# Patient Record
Sex: FEMALE | Race: WHITE | NOT HISPANIC OR LATINO
[De-identification: names, ages, dates, MRNs, and addresses within clinical notes are randomized per-mention and may not be internally consistent; named-entity substitution may affect disease eponyms.]

---

## 2021-05-10 ENCOUNTER — NON-APPOINTMENT (OUTPATIENT)
Age: 81
End: 2021-05-10

## 2021-05-10 ENCOUNTER — APPOINTMENT (OUTPATIENT)
Dept: INTERNAL MEDICINE | Facility: CLINIC | Age: 81
End: 2021-05-10
Payer: MEDICARE

## 2021-05-10 VITALS
DIASTOLIC BLOOD PRESSURE: 74 MMHG | SYSTOLIC BLOOD PRESSURE: 136 MMHG | OXYGEN SATURATION: 98 % | BODY MASS INDEX: 30.82 KG/M2 | TEMPERATURE: 97.3 F | HEIGHT: 65 IN | HEART RATE: 115 BPM | RESPIRATION RATE: 14 BRPM | WEIGHT: 185 LBS

## 2021-05-10 DIAGNOSIS — Z80.42 FAMILY HISTORY OF MALIGNANT NEOPLASM OF PROSTATE: ICD-10-CM

## 2021-05-10 DIAGNOSIS — E53.8 DEFICIENCY OF OTHER SPECIFIED B GROUP VITAMINS: ICD-10-CM

## 2021-05-10 DIAGNOSIS — Z82.0 FAMILY HISTORY OF EPILEPSY AND OTHER DISEASES OF THE NERVOUS SYSTEM: ICD-10-CM

## 2021-05-10 DIAGNOSIS — Z82.49 FAMILY HISTORY OF ISCHEMIC HEART DISEASE AND OTHER DISEASES OF THE CIRCULATORY SYSTEM: ICD-10-CM

## 2021-05-10 PROBLEM — Z00.00 ENCOUNTER FOR PREVENTIVE HEALTH EXAMINATION: Status: ACTIVE | Noted: 2021-05-10

## 2021-05-10 PROCEDURE — 99204 OFFICE O/P NEW MOD 45 MIN: CPT

## 2021-05-10 RX ORDER — OMEGA-3/DHA/EPA/FISH OIL 1200 MG
1200 CAPSULE ORAL
Refills: 0 | Status: ACTIVE | COMMUNITY

## 2021-05-10 RX ORDER — ASPIRIN ENTERIC COATED TABLETS 81 MG 81 MG/1
81 TABLET, DELAYED RELEASE ORAL
Qty: 90 | Refills: 0 | Status: ACTIVE | COMMUNITY

## 2021-05-10 NOTE — HEALTH RISK ASSESSMENT
[Good] : ~his/her~  mood as  good [No] : In the past 12 months have you used drugs other than those required for medical reasons? No [No falls in past year] : Patient reported no falls in the past year [0] : 2) Feeling down, depressed, or hopeless: Not at all (0) [] : No [[id number]] : 0

## 2021-05-10 NOTE — END OF VISIT
[] : A student assisted with documenting this visit. I have reviewed and verified all information documented by the student, and made modifications to such information, when appropriate. [FreeTextEntry3] : "I, Xander Sears, personally scribed the services dictated to me by Dr. Jesus Alberto Reyes MD in this documentation on 05/10/2021 "\par \par "I Dr. Jesus Alberto Reyes MD, personally performed the services described in this documentation on 05/10/2021 for the patient as scribed by Xander Sears in my presence. I have reviewed and verified that all the information is accurate and true."

## 2021-05-10 NOTE — PLAN
[FreeTextEntry1] : referred to oncology\par Further instructions pending lab results \par Continue medications\par

## 2021-05-10 NOTE — HISTORY OF PRESENT ILLNESS
[Family Member] : family member [FreeTextEntry1] : new pt [de-identified] : TAVARES DIAZ is a 80 year old F who presents today to establish care. Pt complains of runny nose. Has history of lymphoma, diabetes, and hyperchloremia.

## 2021-05-10 NOTE — PHYSICAL EXAM
[No Acute Distress] : no acute distress [Well Nourished] : well nourished [Well Developed] : well developed [Well-Appearing] : well-appearing [Normal Voice/Communication] : normal voice/communication [Normal Sclera/Conjunctiva] : normal sclera/conjunctiva [PERRL] : pupils equal round and reactive to light [EOMI] : extraocular movements intact [Normal Outer Ear/Nose] : the outer ears and nose were normal in appearance [No JVD] : no jugular venous distention [No Lymphadenopathy] : no lymphadenopathy [Supple] : supple [Thyroid Normal, No Nodules] : the thyroid was normal and there were no nodules present [No Respiratory Distress] : no respiratory distress  [No Accessory Muscle Use] : no accessory muscle use [Clear to Auscultation] : lungs were clear to auscultation bilaterally [Normal Rate] : normal rate  [Regular Rhythm] : with a regular rhythm [Normal S1, S2] : normal S1 and S2 [No Carotid Bruits] : no carotid bruits [No Murmur] : no murmur heard [No Abdominal Bruit] : a ~M bruit was not heard ~T in the abdomen [No Varicosities] : no varicosities [Pedal Pulses Present] : the pedal pulses are present [No Edema] : there was no peripheral edema [No Palpable Aorta] : no palpable aorta [No Extremity Clubbing/Cyanosis] : no extremity clubbing/cyanosis [Soft] : abdomen soft [Non Tender] : non-tender [Non-distended] : non-distended [No Masses] : no abdominal mass palpated [No HSM] : no HSM [Normal Bowel Sounds] : normal bowel sounds [Normal Supraclavicular Nodes] : no supraclavicular lymphadenopathy [Normal Posterior Cervical Nodes] : no posterior cervical lymphadenopathy [Normal Anterior Cervical Nodes] : no anterior cervical lymphadenopathy [No CVA Tenderness] : no CVA  tenderness [No Spinal Tenderness] : no spinal tenderness [No Joint Swelling] : no joint swelling [Grossly Normal Strength/Tone] : grossly normal strength/tone [No Rash] : no rash [Coordination Grossly Intact] : coordination grossly intact [No Focal Deficits] : no focal deficits [Normal Gait] : normal gait [Deep Tendon Reflexes (DTR)] : deep tendon reflexes were 2+ and symmetric [Speech Grossly Normal] : speech grossly normal [Memory Grossly Normal] : memory grossly normal [Normal Affect] : the affect was normal [Alert and Oriented x3] : oriented to person, place, and time [Normal Mood] : the mood was normal [Normal Insight/Judgement] : insight and judgment were intact

## 2021-05-11 ENCOUNTER — TRANSCRIPTION ENCOUNTER (OUTPATIENT)
Age: 81
End: 2021-05-11

## 2021-05-11 LAB
25(OH)D3 SERPL-MCNC: 42.8 NG/ML
ALBUMIN SERPL ELPH-MCNC: 4.5 G/DL
ALP BLD-CCNC: 108 U/L
ALT SERPL-CCNC: 13 U/L
ANION GAP SERPL CALC-SCNC: 12 MMOL/L
AST SERPL-CCNC: 17 U/L
BASOPHILS # BLD AUTO: 0.03 K/UL
BASOPHILS NFR BLD AUTO: 0.4 %
BILIRUB SERPL-MCNC: 0.4 MG/DL
BUN SERPL-MCNC: 18 MG/DL
CALCIUM SERPL-MCNC: 9.9 MG/DL
CHLORIDE SERPL-SCNC: 105 MMOL/L
CHOLEST SERPL-MCNC: 156 MG/DL
CK SERPL-CCNC: 54 U/L
CO2 SERPL-SCNC: 27 MMOL/L
CREAT SERPL-MCNC: 0.87 MG/DL
EOSINOPHIL # BLD AUTO: 0.11 K/UL
EOSINOPHIL NFR BLD AUTO: 1.4 %
ESTIMATED AVERAGE GLUCOSE: 143 MG/DL
GLUCOSE SERPL-MCNC: 134 MG/DL
HBA1C MFR BLD HPLC: 6.6 %
HCT VFR BLD CALC: 45.1 %
HDLC SERPL-MCNC: 55 MG/DL
HGB BLD-MCNC: 14.2 G/DL
IMM GRANULOCYTES NFR BLD AUTO: 0.1 %
LDLC SERPL CALC-MCNC: 70 MG/DL
LYMPHOCYTES # BLD AUTO: 2.76 K/UL
LYMPHOCYTES NFR BLD AUTO: 34.2 %
MAN DIFF?: NORMAL
MCHC RBC-ENTMCNC: 28.9 PG
MCHC RBC-ENTMCNC: 31.5 GM/DL
MCV RBC AUTO: 91.9 FL
MONOCYTES # BLD AUTO: 0.53 K/UL
MONOCYTES NFR BLD AUTO: 6.6 %
NEUTROPHILS # BLD AUTO: 4.64 K/UL
NEUTROPHILS NFR BLD AUTO: 57.3 %
NONHDLC SERPL-MCNC: 101 MG/DL
PLATELET # BLD AUTO: 253 K/UL
POTASSIUM SERPL-SCNC: 5.1 MMOL/L
PROT SERPL-MCNC: 7.1 G/DL
RBC # BLD: 4.91 M/UL
RBC # FLD: 13.2 %
SODIUM SERPL-SCNC: 143 MMOL/L
TRIGL SERPL-MCNC: 155 MG/DL
TSH SERPL-ACNC: 1.67 UIU/ML
WBC # FLD AUTO: 8.08 K/UL

## 2021-07-08 ENCOUNTER — RESULT REVIEW (OUTPATIENT)
Age: 81
End: 2021-07-08

## 2021-07-08 ENCOUNTER — OUTPATIENT (OUTPATIENT)
Dept: OUTPATIENT SERVICES | Facility: HOSPITAL | Age: 81
LOS: 1 days | Discharge: HOME | End: 2021-07-08

## 2021-07-08 ENCOUNTER — APPOINTMENT (OUTPATIENT)
Dept: HEMATOLOGY ONCOLOGY | Facility: CLINIC | Age: 81
End: 2021-07-08
Payer: MEDICARE

## 2021-07-08 VITALS
TEMPERATURE: 97.9 F | HEART RATE: 63 BPM | SYSTOLIC BLOOD PRESSURE: 160 MMHG | WEIGHT: 186 LBS | BODY MASS INDEX: 30.95 KG/M2 | DIASTOLIC BLOOD PRESSURE: 74 MMHG

## 2021-07-08 DIAGNOSIS — N32.89 OTHER SPECIFIED DISORDERS OF BLADDER: ICD-10-CM

## 2021-07-08 DIAGNOSIS — C18.9 MALIGNANT NEOPLASM OF COLON, UNSPECIFIED: ICD-10-CM

## 2021-07-08 DIAGNOSIS — R91.1 SOLITARY PULMONARY NODULE: ICD-10-CM

## 2021-07-08 PROCEDURE — 99205 OFFICE O/P NEW HI 60 MIN: CPT

## 2021-07-08 NOTE — RESULTS/DATA
[FreeTextEntry1] : 9/3/20 Cystoscopy: Introitus healthy without lesions. Bladder showed minimal trabeculation and three large mouth diverticula at tight dome. No erythema, calculi or masses. \par \par 8/13/20 PET: large partial response to therapy in the multifocal skeletal disease with resolution of nearly all of the areas. There is persistent abnormal activity in the anterior right 3rd rib. Complete response to therapy involving cardiophrenic adenopathy. Bilateral nonspecific thyroid activity, moderately decreased. A left lower lobe pulmonary nodule demonstrated on the July diagnostic CT is not demonstrated today and likely represented small round basilar atelectasis. \par \par 7/21/20 CT C/A/P: new 7mm left lower lobe pulmonary nodule, possible metastasis. Additional subcentimeter pulmonary nodules are stable. Interval resolution of a pleural-based lesion adjacent to the left 3rd rib. Significant decrease in size of an anterior pericardial lymph node, however there has been increase in size of a lymph node adjacent to the supradiaphragmatic IVC. Stable hepatic cysts and subcentimeter perihepatic nodules. No new liver mass. Development of asymmetric urinary bladder wall thickening and multiple bladder wall nodules. \par \par 6/11/20 CT spine: Interval decrease in size of soft tissue nodule in the left paraspinal region at T12 level. It measures about 1cm in diameter. No new paraspinal lesions are noted. There is partial visualization of hepatic foci of low-attenuation, correlation with abd CT is recommended. \par \par 3/3/20 MRI: well-circumscribed enhancing left paraspinal soft tissue nodule is noted at T12 level that is slightly increased in size when compared to prior study. There is corresponding increased perfusion. It measures 22 x 7mm in diameter. this may represent a metastatic deposit. Previously noted left posterior 3rd rib soft tissue abnormality is smaller in size when compared to prior study. Few mm focus of abnormal signal involving the T7 vertebral body is not significantly changed. 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 Flow cytometry showed CD20, CD10, CD23, CD21, BCL2, CD19 and CD45 positive. Ki67- less than 3%.\par \par 8/6/19 PET: Findings suspicious for residual metabolically active metastasis involving the left-sided pleural-based soft tissue lesion, cardiophrenic angle lymph node, retroperitoneal lymph nodes and osseous lesions. There is mild heterogeneous abnormal FDG uptake involving the liver without definite CT correlates. This is favored to represent an artifactual etiology, although underlying low-grade metabolically active metastasis cannot be excluded. There are bilateral FDG avid thyroid gland nodules. The differential includes primary thyroid metabolically active malignancy versus benign adenomas. A tissue  biopsy is recommended. \par \par 8/6/19 MRI: nodule posterior medial left lung centered at T2 and based on the pleura also affecting the left posterior medial rib. This is most likely neoplastic. Area of enhancement in the posterior body and pedicle on the right at T7 possibly represents a metastatic focus. Heterogeneous enhancement within the nasal pharynx is uncertain cause. \par \par 7/5/19 CT: 1.9cm enhancing pleural plaque along the left paraspinal region at the T3 level which is new from the prior CT suspicious for a metastatic lesion. \par post surgical changes from right hemicolectomy with no evidence of tumor recurrence in the abdomen or pelvis. \par \par 6/14/18 colonoscopy completed, repeat in 3 years\par \par 7/5/17 path: liver excisional biopsy: fibrous nodule measuring 0.6cm negative for carcinoma. \par colon ascending right hemicolectomy: invasive adenocarcinoma, moderately differentiated, invading subserosa; surgical margins negative for carcinoma\par synoptic report - colon rectum: right hemicolectomy, specimen size 30cm, tumor site: right ascending, 2.8cm; adenocarcinoma low-grade (well differentiated and moderately differentiated). Microscopic tumor extension: tumor invades through the muscularis propria into the subserosal adipose tissue or the nonperitonealized pericolic or perirectal soft tissues but does not extend to the serosal surface. proximal margins uninvolved by invasive carcinoma; circumferential or mesenteric margin uninvolved by invasive carcinoma. Lynph-vascular invasion; not identified; perineural invasion: not identified. Immunohistochemistry studies for mismatch repair proteins: [id number]. LN examined: 16, LN involved: 0 \par \par 6/15/17 Pathology: ileocecal valve polyp biopsy: colonic mucosa with focal early adenomatous changes; ascending colon polyp biopsy: tubular adenoma; hepatic flexure mass: invasive moderately differentiated adenocarcinoma. immunohistochemical studies: tumor cells retain nuclear stains for MLH1, MSH2, MMSH6 and PMS2. \par \par 6/9/17 CT C/A/P: a mass at the level of the hepatic flexure without evidence of regional cali metastases. Multiple small bipolar hepatic cysts, largest measures 2.14cm. No evidence of distant metastatic disease. \par \par 6/6/17 Colonoscopy: previously identified lipomas and a polypoid, ulcerated, non-obstruction mass at the hepatic flexure 4cm in length, tattooed and biopsy consistent with moderately differentiated adenocarcinoma. The previously noted hepatic flexure lipoma located just proximal to the ulderated mass. Few 5mm polyps removed from the ascending colon and ICV consistent with TA and colonic mucosa with focal early adenomatous changes, respectively. CEA non elevated.\par \par 1/30/14 Colonoscopy: cecal lipoma and hepatic flexure lipoma

## 2021-07-08 NOTE — HISTORY OF PRESENT ILLNESS
[de-identified] : The patient was diagnosed with colon cancer in June 2017 at the age of 76. On 1/30/14, she had a routine colonoscopy showing a cecal lipoma and hepatic flexure lipoma. Follow up colonoscopy on 6/6/17 showed previously identified lipomas and a polypoid, ulcerated, non-obstructing mass at the hepatic flexure 4cm in length consistent with moderately differentiated adenocarcinoma.   CEA was not elevated. CT 6/9/17 showed a mass at the level of the hepatic flexure without evidence of regional cali metastases. and multiple bipolar hepatic cysts, largest measuring 2 cm. She underwent a right hemicolectomy and liver excisional biopsy.  Pathology was c/w invasive adenocarcinoma, moderately differentiated, invading subserosa; surgical margins negative for carcinoma.  Liver biopsy was c/w a fibrous nodule that was negative for carcinoma. \par  [id number]. LN examined: 16, LN involved: 0.  Tumor cells retained nuclear stains for MLH1, MSH2, MMSH6 and PMS2. \par \par Per record dated August 13, 2020, she has been on surveillance.\par 7/5/2019 CT: Enhancing pleural plaque along the left paraspinal region at T3, suspicious for metastatic lesion\par 7/5/2019 PET: Hypermetabolic pleural lesion, cardiophrenic and retroperitoneal lymphadenopathy, osseous lesions\par 8/14/2019 biopsy lung lesion: Marginal zone B-cell lymphoma.  Followed by lymphoma service in [place] and on no active treatment.\par 6/11/2020 CT: Interval decrease in size of soft tissue nodule in the left paraspinal region at T12.  It measures about 1 cm.  No new paraspinal lesions.\par 7/21/2020 CT: New 7 mm left lower lobe nodule.  Additional subcentimeter pulmonary nodules stable.  Interval resolution of pleural-based lesion adjacent to left third rib.  Increase in size of lymph node adjacent to subdiaphragmatic IVC.  Stable hepatic cysts and subcentimeter perihepatic nodules.  No liver mass.  Asymmetric urinary bladder wall thickening and multiple bladder wall nodules.\par 8/13/2020 PET: Large partial response to therapy in the multifocal skeletal disease with resolution of nearly all of the areas.  Persistent abnormal activity in the anterior third rib.  Complete response to therapy involving cardiophrenic adenopathy.  Bilateral nonspecific thyroid activity.  Left lower lobe pulmonary nodule no longer demonstrated. [de-identified] : SH:  PT moved here from [place] recently;  Pt is a .  Lives with daughter here and has a son in [place].

## 2021-07-09 DIAGNOSIS — R91.1 SOLITARY PULMONARY NODULE: ICD-10-CM

## 2021-07-09 DIAGNOSIS — C85.90 NON-HODGKIN LYMPHOMA, UNSPECIFIED, UNSPECIFIED SITE: ICD-10-CM

## 2021-07-09 DIAGNOSIS — N32.89 OTHER SPECIFIED DISORDERS OF BLADDER: ICD-10-CM

## 2021-07-19 ENCOUNTER — RESULT REVIEW (OUTPATIENT)
Age: 81
End: 2021-07-19

## 2021-07-19 ENCOUNTER — OUTPATIENT (OUTPATIENT)
Dept: OUTPATIENT SERVICES | Facility: HOSPITAL | Age: 81
LOS: 1 days | End: 2021-07-19
Payer: MEDICARE

## 2021-07-19 ENCOUNTER — APPOINTMENT (OUTPATIENT)
Dept: MAMMOGRAPHY | Facility: CLINIC | Age: 81
End: 2021-07-19
Payer: MEDICARE

## 2021-07-19 DIAGNOSIS — C18.9 MALIGNANT NEOPLASM OF COLON, UNSPECIFIED: ICD-10-CM

## 2021-07-19 PROCEDURE — 77063 BREAST TOMOSYNTHESIS BI: CPT | Mod: 26

## 2021-07-19 PROCEDURE — 77067 SCR MAMMO BI INCL CAD: CPT | Mod: 26

## 2021-07-19 PROCEDURE — 77063 BREAST TOMOSYNTHESIS BI: CPT

## 2021-07-19 PROCEDURE — 77067 SCR MAMMO BI INCL CAD: CPT

## 2021-07-20 ENCOUNTER — APPOINTMENT (OUTPATIENT)
Dept: INTERNAL MEDICINE | Facility: CLINIC | Age: 81
End: 2021-07-20
Payer: MEDICARE

## 2021-07-20 ENCOUNTER — NON-APPOINTMENT (OUTPATIENT)
Age: 81
End: 2021-07-20

## 2021-07-20 VITALS
HEART RATE: 70 BPM | RESPIRATION RATE: 15 BRPM | DIASTOLIC BLOOD PRESSURE: 78 MMHG | SYSTOLIC BLOOD PRESSURE: 142 MMHG | HEIGHT: 65 IN | BODY MASS INDEX: 30.82 KG/M2 | WEIGHT: 185 LBS | OXYGEN SATURATION: 98 % | TEMPERATURE: 97.2 F

## 2021-07-20 PROCEDURE — 99214 OFFICE O/P EST MOD 30 MIN: CPT | Mod: 25

## 2021-07-20 PROCEDURE — 81003 URINALYSIS AUTO W/O SCOPE: CPT | Mod: QW

## 2021-07-20 PROCEDURE — 99204 OFFICE O/P NEW MOD 45 MIN: CPT | Mod: 25

## 2021-07-21 LAB
BILIRUB UR QL STRIP: NORMAL
GLUCOSE UR-MCNC: NORMAL
HCG UR QL: 0.2 EU/DL
HGB UR QL STRIP.AUTO: NORMAL
KETONES UR-MCNC: NORMAL
LEUKOCYTE ESTERASE UR QL STRIP: NORMAL
NITRITE UR QL STRIP: NORMAL
PH UR STRIP: 5
PROT UR STRIP-MCNC: NORMAL
SP GR UR STRIP: 1.03

## 2021-07-21 RX ORDER — BLOOD SUGAR DIAGNOSTIC
STRIP MISCELLANEOUS
Qty: 100 | Refills: 0 | Status: ACTIVE | COMMUNITY
Start: 2020-10-05

## 2021-07-21 NOTE — HISTORY OF PRESENT ILLNESS
[FreeTextEntry8] : Ms. 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 is over all feeling well.\par No trouble sleeping at night.\par \par

## 2021-07-21 NOTE — REVIEW OF SYSTEMS
[Frequency] : frequency [Negative] : Gastrointestinal [Incontinence] : no incontinence [Hematuria] : no hematuria

## 2021-07-21 NOTE — HEALTH RISK ASSESSMENT
[No falls in past year] : Patient reported no falls in the past year [0] : 2) Feeling down, depressed, or hopeless: Not at all (0) [[id number]] : 0

## 2021-07-21 NOTE — ASSESSMENT
[FreeTextEntry1] : Ms. 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

## 2021-07-24 ENCOUNTER — APPOINTMENT (OUTPATIENT)
Dept: NUCLEAR MEDICINE | Facility: CLINIC | Age: 81
End: 2021-07-24

## 2021-07-27 LAB — BACTERIA UR CULT: ABNORMAL

## 2021-07-31 ENCOUNTER — APPOINTMENT (OUTPATIENT)
Dept: NUCLEAR MEDICINE | Facility: CLINIC | Age: 81
End: 2021-07-31
Payer: MEDICARE

## 2021-07-31 ENCOUNTER — OUTPATIENT (OUTPATIENT)
Dept: OUTPATIENT SERVICES | Facility: HOSPITAL | Age: 81
LOS: 1 days | End: 2021-07-31
Payer: MEDICARE

## 2021-07-31 DIAGNOSIS — C18.9 MALIGNANT NEOPLASM OF COLON, UNSPECIFIED: ICD-10-CM

## 2021-07-31 PROCEDURE — 78815 PET IMAGE W/CT SKULL-THIGH: CPT | Mod: 26,PI,MH

## 2021-07-31 PROCEDURE — 78815 PET IMAGE W/CT SKULL-THIGH: CPT

## 2021-07-31 PROCEDURE — A9552: CPT

## 2021-08-05 ENCOUNTER — APPOINTMENT (OUTPATIENT)
Dept: OBGYN | Facility: CLINIC | Age: 81
End: 2021-08-05
Payer: MEDICARE

## 2021-08-05 VITALS
SYSTOLIC BLOOD PRESSURE: 130 MMHG | HEIGHT: 65 IN | DIASTOLIC BLOOD PRESSURE: 72 MMHG | WEIGHT: 183.4 LBS | BODY MASS INDEX: 30.56 KG/M2

## 2021-08-05 PROCEDURE — G0101: CPT

## 2021-08-05 NOTE — HISTORY OF PRESENT ILLNESS
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 She had a mammo a couple of months ago has dense breast we will give her a sono rx. Normal hx for paps. No  bleeding. Hxcolon cancer 2017, lymphoma dx in 2019, mammo july 2021 [Previously active] : previously active

## 2021-08-05 NOTE — PHYSICAL EXAM

## 2021-08-06 ENCOUNTER — APPOINTMENT (OUTPATIENT)
Dept: GASTROENTEROLOGY | Facility: CLINIC | Age: 81
End: 2021-08-06
Payer: MEDICARE

## 2021-08-06 VITALS
HEIGHT: 65 IN | WEIGHT: 183 LBS | DIASTOLIC BLOOD PRESSURE: 69 MMHG | HEART RATE: 69 BPM | SYSTOLIC BLOOD PRESSURE: 129 MMHG | BODY MASS INDEX: 30.49 KG/M2

## 2021-08-06 PROCEDURE — 99203 OFFICE O/P NEW LOW 30 MIN: CPT

## 2021-08-06 NOTE — ASSESSMENT
[FreeTextEntry1] : 82 yo female with personal history of colon cancer. Will arrange for colonoscopy with Miralax clean out.

## 2021-08-06 NOTE — HISTORY OF PRESENT ILLNESS
[de-identified] : Ms. TAVARES DIAZ is a 81 year old female with history of colon cancer status post right hemicolectomy. Patient was also diagnosed within the lymphoma which has been followed. Patient has no complaints of abdominal pain, rectal bleeding, heartburn. Prior records were reviewed. Patient would like to schedule surveillance colonoscopy.\par

## 2021-08-09 ENCOUNTER — NON-APPOINTMENT (OUTPATIENT)
Age: 81
End: 2021-08-09

## 2021-08-12 LAB — BACTERIA UR CULT: NORMAL

## 2021-08-13 ENCOUNTER — APPOINTMENT (OUTPATIENT)
Dept: HEMATOLOGY ONCOLOGY | Facility: CLINIC | Age: 81
End: 2021-08-13
Payer: MEDICARE

## 2021-08-13 ENCOUNTER — OUTPATIENT (OUTPATIENT)
Dept: OUTPATIENT SERVICES | Facility: HOSPITAL | Age: 81
LOS: 1 days | Discharge: HOME | End: 2021-08-13

## 2021-08-13 VITALS
HEART RATE: 71 BPM | SYSTOLIC BLOOD PRESSURE: 120 MMHG | DIASTOLIC BLOOD PRESSURE: 72 MMHG | TEMPERATURE: 97.3 F | WEIGHT: 183 LBS | BODY MASS INDEX: 30.45 KG/M2

## 2021-08-13 PROCEDURE — 99215 OFFICE O/P EST HI 40 MIN: CPT

## 2021-08-13 NOTE — REASON FOR VISIT
[Initial Consultation] : an initial consultation for [FreeTextEntry2] : marginal zone lymphoma diagnosed in 2019

## 2021-08-13 NOTE — RESULTS/DATA
[FreeTextEntry1] : reviewed report and images form recent PET and  images from PET 2020 and 2019 in  [place]

## 2021-08-13 NOTE — PHYSICAL EXAM
[Fully active, able to carry on all pre-disease performance without restriction] : Status 0 - Fully active, able to carry on all pre-disease performance without restriction [Normal] : affect appropriate [de-identified] : looks well  [de-identified] : cannot  palpate thyroid nodules

## 2021-08-13 NOTE — ASSESSMENT
[FreeTextEntry1] : 82 y/o female with hx of stage II colon cancer s/p R hemicolectomy 2017- DIALLO  Surveillance colonoscopy scheduled.\par \par Hx of MALT lymphoma- paraspinal/ pleural based and ? avid  bone lesions on PET - documented since at least 2019 - but per recent report- worse .\par Will review images with radiology- bone lesions presumed to be lymphoma- but never proven by biopsy ( possible but still unusual presentation of MALT). Might need biopsy of one of avid bone areas.\par Avid thyroid nodules- will refer to endocrinology. Might  need FNA / biopsy to r/o primary thyroid  malignancy.\par \par Discussed with patient  and her daughter. Will call patient if bone biopsy necessary-  otherwise routine follow up in 3 months.

## 2021-08-13 NOTE — HISTORY OF PRESENT ILLNESS
[de-identified] : 81 F seen previously at [facility], now moved to .\par \par Has hx of colon cancer 2017- 1/15/17 right hemicolectomy pT3, N0  ( 0/16)  mod diff adeno of sigmoid flexure MSI intact \par Follow up scans for surveillance after colon cancer in July 2019 showed pleural plaque left paraspinal at low thoracic  level,  avid on PET. [facility] pleural biopsy  8/14/19  : extranodal marginal zone lymphoma  ( MALT)  ( CD 20, CD 10, CD 23, CD19, CD 45 positive ki67 < 3 %  . No systemic tx  was necessary- active surveillance \par Follow up PET done here recently. \par \par Feels well . Active, no systemic sx, no pain, no bone pain. Colonoscopy scheduled for Sept ( colon cancer follow up) \par Last scans :

## 2021-08-16 DIAGNOSIS — C18.9 MALIGNANT NEOPLASM OF COLON, UNSPECIFIED: ICD-10-CM

## 2021-08-17 ENCOUNTER — RESULT REVIEW (OUTPATIENT)
Age: 81
End: 2021-08-17

## 2021-08-19 ENCOUNTER — APPOINTMENT (OUTPATIENT)
Dept: HEMATOLOGY ONCOLOGY | Facility: CLINIC | Age: 81
End: 2021-08-19

## 2021-08-19 LAB — SURGICAL PATHOLOGY STUDY: SIGNIFICANT CHANGE UP

## 2021-08-25 DIAGNOSIS — E04.1 NONTOXIC SINGLE THYROID NODULE: ICD-10-CM

## 2021-08-25 DIAGNOSIS — C88.4 EXTRANODAL MARGINAL ZONE B-CELL LYMPHOMA OF MUCOSA-ASSOCIATED LYMPHOID TISSUE [MALT-LYMPHOMA]: ICD-10-CM

## 2021-09-02 ENCOUNTER — APPOINTMENT (OUTPATIENT)
Dept: ENDOCRINOLOGY | Facility: CLINIC | Age: 81
End: 2021-09-02
Payer: MEDICARE

## 2021-09-02 VITALS
BODY MASS INDEX: 30.32 KG/M2 | WEIGHT: 182 LBS | DIASTOLIC BLOOD PRESSURE: 70 MMHG | HEART RATE: 75 BPM | OXYGEN SATURATION: 98 % | HEIGHT: 65 IN | SYSTOLIC BLOOD PRESSURE: 120 MMHG | TEMPERATURE: 97.6 F | RESPIRATION RATE: 16 BRPM

## 2021-09-02 LAB — HBA1C MFR BLD HPLC: 6.6

## 2021-09-02 PROCEDURE — 99205 OFFICE O/P NEW HI 60 MIN: CPT | Mod: 25

## 2021-09-02 PROCEDURE — 99215 OFFICE O/P EST HI 40 MIN: CPT | Mod: 25

## 2021-09-02 PROCEDURE — 83036 HEMOGLOBIN GLYCOSYLATED A1C: CPT | Mod: QW

## 2021-09-02 RX ORDER — SULFAMETHOXAZOLE AND TRIMETHOPRIM 800; 160 MG/1; MG/1
800-160 TABLET ORAL TWICE DAILY
Qty: 14 | Refills: 0 | Status: COMPLETED | COMMUNITY
Start: 2021-07-20 | End: 2021-09-02

## 2021-09-02 RX ORDER — LEVOFLOXACIN 500 MG/1
500 TABLET, FILM COATED ORAL DAILY
Qty: 5 | Refills: 0 | Status: COMPLETED | COMMUNITY
Start: 2021-07-27 | End: 2021-09-02

## 2021-09-02 RX ORDER — LEVOFLOXACIN 500 MG/1
500 TABLET, FILM COATED ORAL DAILY
Qty: 7 | Refills: 0 | Status: COMPLETED | COMMUNITY
Start: 2021-08-09 | End: 2021-09-02

## 2021-09-02 NOTE — REASON FOR VISIT
[Initial Evaluation] : an initial evaluation [Thyroid nodule/ MNG] : thyroid nodule/ MNG [DM Type 2] : DM Type 2 [Family Member] : family member

## 2021-09-02 NOTE — HISTORY OF PRESENT ILLNESS
[FreeTextEntry1] : Ms. Luna is presenting to establish care for her hypothyroidism, diabetes and thyroid nodule. 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 She is referred by Oncologist Dr. Franz to rule out thyroid cancer as recent PET scan showed 2 nodules. \par \par #Hypothyroidism \par Diagnosed at least 10 years ago. \par TSH 1.67 May 2021\par Levothyroxine dose 75mcg has been stable. \par Daughter has PTC, thyroidectomy. \par \par #Thyroid Nodule \par FDG avid low-attenuation left lower thyroid lobe substernal focus, 1.4 cm (image 74), SUV 10.2; previous SUV 5.6. Smaller focus in the posterior right upper thyroid lobe, SUV 8.0 (image 71); previous SUV 3.6. Physiologic FDG activity in visualized portions of the brain, major salivary glands, and neck muscles.\par Never had a thyroid sonogram before or FNA. \par \par #Diabetes type 2\par DM for over 10 years. \par Reports no microvascular complications, no history of retinopathy, nephropathy or neuropathy. Reports no history of cardiovascular risk factors, no history of CAD, CHF or CVA in the past. \par \par Current DM meds: metformin 500mg-glipizide 2.5mg 1-2x. Takes it once daily but if AM sugar is >140 then she takes the second tablet. Januvia 100mg \par Prior DM meds: \par Other pertinent meds: \par \par POCT A1c%: 6.6% \par POCT B\par \par FSG: Freestyle lite \par Pre-Breakfast: 109-185\par Hypoglycemic episodes: none \par 7 day avg 147\par 14 day avg 140\par 30 day avg 140\par \par Diet: \par Breakfast: Grits with cheese and torres \par Lunch: Grits with cheese \par Dinner: chicken or salmon or turkey burgers with salad or veggies. \par Snacks: Fruits: once in a while has banana or apple.  \par \par Exercise: Walks on occasion. \par Urine micro: ARB: losartan 100mg \par C-peptide: \par Cr: 0.50 GFR: 75\par Lipid profile: LDL 70 May 2021 Statin: lipitor 10mg \par HbA1c%: 6.6% May 2021. \par Ophthalmology: 2020, cataract surgery. No DR. \par Neuropathy: None \par Podiatry/Diabetic foot exam: Podiatry 2021. F/u 2021. \par  \par \par

## 2021-09-02 NOTE — PHYSICAL EXAM
[Alert] : alert [Well Nourished] : well nourished [No Acute Distress] : no acute distress [Well Developed] : well developed [Normal Sclera/Conjunctiva] : normal sclera/conjunctiva [EOMI] : extra ocular movement intact [No Proptosis] : no proptosis [Normal Oropharynx] : the oropharynx was normal [Thyroid Not Enlarged] : the thyroid was not enlarged [No Respiratory Distress] : no respiratory distress [No Accessory Muscle Use] : no accessory muscle use [Normal Rate] : heart rate was normal [No Edema] : no peripheral edema [Not Tender] : non-tender [Not Distended] : not distended [Soft] : abdomen soft [Normal Anterior Cervical Nodes] : no anterior cervical lymphadenopathy [Normal Posterior Cervical Nodes] : no posterior cervical lymphadenopathy [No Spinal Tenderness] : no spinal tenderness [Spine Straight] : spine straight [No Stigmata of Cushings Syndrome] : no stigmata of Cushings Syndrome [Normal Gait] : normal gait [Normal Strength/Tone] : muscle strength and tone were normal [No Rash] : no rash [No Skin Lesions] : no skin lesions [Abdominal Striae] : no abdominal striae [Acanthosis Nigricans] : no acanthosis nigricans [Normal] : normal [Normal Reflexes] : deep tendon reflexes were 2+ and symmetric [No Tremors] : no tremors [Oriented x3] : oriented to person, place, and time [de-identified] : BMI 30  [de-identified] : +memory loss of recent and remote events.  [de-identified] : HR 75

## 2021-09-02 NOTE — ASSESSMENT
[Diabetes Foot Care] : diabetes foot care [Carbohydrate Consistent Diet] : carbohydrate consistent diet [Importance of Diet and Exercise] : importance of diet and exercise to improve glycemic control, achieve weight loss and improve cardiovascular health [Exercise/Effect on Glucose] : exercise/effect on glucose [Hypoglycemia Management] : hypoglycemia management [Self Monitoring of Blood Glucose] : self monitoring of blood glucose [Retinopathy Screening] : Patient was referred to ophthalmology for retinopathy screening [Diabetic Medications] : Risks and benefits of diabetic medications were discussed [Levothyroxine] : The patient was instructed to take Levothyroxine on an empty stomach, separate from vitamins, and wait at least 30 minutes before eating [FreeTextEntry1] : 81 year old female with PMH of colon cancer 2017, HTN, mild cognitive disorder (memory loss), (1/15/17 right hemicolectomy pT3, N0 (0/16) moderately differentiated adenocarcinoma of sigmoid flexure), 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 She is referred by Oncologist Dr. Franz to rule out thyroid cancer as recent PET scan showed 2 nodules. \par \par 1. Hypothyroidism \par Diagnosed at least 10 years ago. \par TSH 1.67 May 2021\par continue Levothyroxine dose 75mcg once daily in AM \par \par 2. Thyroid Nodule \par FDG avid low-attenuation left lower thyroid lobe substernal focus, 1.4 cm (image 74). Smaller focus in the posterior right upper thyroid lobe. \par Refer for thyroid sonogram, discussed that she may need a FNA. \par \par 3. Diabetes type 2, HbA1c 6.6% May 2021 \par -We discussed the cardiovascular and microvascular complications of uncontrolled diabetes. We discussed the importance of diet and exercise and lifestyle modification for glycemic control and weight loss. We discussed referral to our diabetes educator and nutrition. We discussed pharmacologic options for glycemic control and weight loss. \par -Pt's A1c is at goal, <7%. \par -Continue metformin/glipizide 500mg/2.5mg 1-2x daily. Discussed that we can try and stop glipizide as it can cause low blood sugar and is used cautiously in the elderly. \par -Continue Januvia 100mg QD \par -LDL at goal, continue statin 10mg \par -Microalbumin today \par -opthal UTD, has appt \par -Continue to check SMBG once daily, can change time of day for more variety. \par \par Patient verbalized understanding of the above. All questions were answered to patient's satisfaction.\par Dispo: Patient will follow up in 4 months. Will discuss need for FNA after sonogram results.

## 2021-09-03 ENCOUNTER — NON-APPOINTMENT (OUTPATIENT)
Age: 81
End: 2021-09-03

## 2021-09-07 LAB
CREAT SPEC-SCNC: 168 MG/DL
MICROALBUMIN 24H UR DL<=1MG/L-MCNC: 2.6 MG/DL
MICROALBUMIN/CREAT 24H UR-RTO: 15 MG/G

## 2021-09-10 ENCOUNTER — APPOINTMENT (OUTPATIENT)
Dept: GASTROENTEROLOGY | Facility: CLINIC | Age: 81
End: 2021-09-10

## 2021-09-24 ENCOUNTER — APPOINTMENT (OUTPATIENT)
Dept: ULTRASOUND IMAGING | Facility: CLINIC | Age: 81
End: 2021-09-24
Payer: MEDICARE

## 2021-09-24 ENCOUNTER — OUTPATIENT (OUTPATIENT)
Dept: OUTPATIENT SERVICES | Facility: HOSPITAL | Age: 81
LOS: 1 days | End: 2021-09-24
Payer: MEDICARE

## 2021-09-24 ENCOUNTER — RESULT REVIEW (OUTPATIENT)
Age: 81
End: 2021-09-24

## 2021-09-24 ENCOUNTER — APPOINTMENT (OUTPATIENT)
Dept: DISASTER EMERGENCY | Facility: CLINIC | Age: 81
End: 2021-09-24

## 2021-09-24 DIAGNOSIS — C88.4 EXTRANODAL MARGINAL ZONE B-CELL LYMPHOMA OF MUCOSA-ASSOCIATED LYMPHOID TISSUE [MALT-LYMPHOMA]: ICD-10-CM

## 2021-09-24 DIAGNOSIS — E04.1 NONTOXIC SINGLE THYROID NODULE: ICD-10-CM

## 2021-09-24 PROCEDURE — 76536 US EXAM OF HEAD AND NECK: CPT | Mod: 26

## 2021-09-24 PROCEDURE — 76641 ULTRASOUND BREAST COMPLETE: CPT | Mod: 26,50

## 2021-09-24 PROCEDURE — 76536 US EXAM OF HEAD AND NECK: CPT

## 2021-09-24 PROCEDURE — 76641 ULTRASOUND BREAST COMPLETE: CPT

## 2021-09-25 LAB — SARS-COV-2 N GENE NPH QL NAA+PROBE: NOT DETECTED

## 2021-09-28 ENCOUNTER — APPOINTMENT (OUTPATIENT)
Dept: GASTROENTEROLOGY | Facility: AMBULATORY MEDICAL SERVICES | Age: 81
End: 2021-09-28
Payer: MEDICARE

## 2021-09-28 PROCEDURE — G0121 COLON CA SCRN NOT HI RSK IND: CPT

## 2021-10-05 ENCOUNTER — NON-APPOINTMENT (OUTPATIENT)
Age: 81
End: 2021-10-05

## 2021-10-05 RX ORDER — LOSARTAN POTASSIUM 100 MG/1
100 TABLET, FILM COATED ORAL DAILY
Qty: 30 | Refills: 3 | Status: DISCONTINUED | COMMUNITY
End: 2021-10-05

## 2021-11-09 ENCOUNTER — OUTPATIENT (OUTPATIENT)
Dept: OUTPATIENT SERVICES | Facility: HOSPITAL | Age: 81
LOS: 1 days | Discharge: HOME | End: 2021-11-09

## 2021-11-09 DIAGNOSIS — Z85.038 PERSONAL HISTORY OF OTHER MALIGNANT NEOPLASM OF LARGE INTESTINE: ICD-10-CM

## 2021-11-09 DIAGNOSIS — C88.4 EXTRANODAL MARGINAL ZONE B-CELL LYMPHOMA OF MUCOSA-ASSOCIATED LYMPHOID TISSUE [MALT-LYMPHOMA]: ICD-10-CM

## 2021-11-09 DIAGNOSIS — C18.9 MALIGNANT NEOPLASM OF COLON, UNSPECIFIED: ICD-10-CM

## 2021-11-12 ENCOUNTER — RESULT REVIEW (OUTPATIENT)
Age: 81
End: 2021-11-12

## 2021-11-12 ENCOUNTER — APPOINTMENT (OUTPATIENT)
Dept: HEMATOLOGY ONCOLOGY | Facility: CLINIC | Age: 81
End: 2021-11-12
Payer: MEDICARE

## 2021-11-12 ENCOUNTER — TRANSCRIPTION ENCOUNTER (OUTPATIENT)
Age: 81
End: 2021-11-12

## 2021-11-12 VITALS
DIASTOLIC BLOOD PRESSURE: 75 MMHG | SYSTOLIC BLOOD PRESSURE: 150 MMHG | BODY MASS INDEX: 30.16 KG/M2 | HEART RATE: 70 BPM | WEIGHT: 181 LBS | HEIGHT: 64.96 IN

## 2021-11-12 LAB
BASOPHILS # BLD AUTO: 0.02 K/UL — SIGNIFICANT CHANGE UP (ref 0–0.2)
BASOPHILS NFR BLD AUTO: 0.2 % — SIGNIFICANT CHANGE UP (ref 0–2)
EOSINOPHIL # BLD AUTO: 0.12 K/UL — SIGNIFICANT CHANGE UP (ref 0–0.5)
EOSINOPHIL NFR BLD AUTO: 1.4 % — SIGNIFICANT CHANGE UP (ref 0–6)
HCT VFR BLD CALC: 41.1 % — SIGNIFICANT CHANGE UP (ref 34.5–45)
HGB BLD-MCNC: 13.8 G/DL — SIGNIFICANT CHANGE UP (ref 11.5–15.5)
IMM GRANULOCYTES NFR BLD AUTO: 0.2 % — SIGNIFICANT CHANGE UP (ref 0–1.5)
LYMPHOCYTES # BLD AUTO: 3.28 K/UL — SIGNIFICANT CHANGE UP (ref 1–3.3)
LYMPHOCYTES # BLD AUTO: 38.7 % — SIGNIFICANT CHANGE UP (ref 13–44)
MCHC RBC-ENTMCNC: 29.4 PG — SIGNIFICANT CHANGE UP (ref 27–34)
MCHC RBC-ENTMCNC: 33.6 GM/DL — SIGNIFICANT CHANGE UP (ref 32–36)
MCV RBC AUTO: 87.4 FL — SIGNIFICANT CHANGE UP (ref 80–100)
MONOCYTES # BLD AUTO: 0.61 K/UL — SIGNIFICANT CHANGE UP (ref 0–0.9)
MONOCYTES NFR BLD AUTO: 7.2 % — SIGNIFICANT CHANGE UP (ref 2–14)
NEUTROPHILS # BLD AUTO: 4.43 K/UL — SIGNIFICANT CHANGE UP (ref 1.8–7.4)
NEUTROPHILS NFR BLD AUTO: 52.3 % — SIGNIFICANT CHANGE UP (ref 43–77)
NRBC # BLD: 0 /100 WBCS — SIGNIFICANT CHANGE UP (ref 0–0)
PLATELET # BLD AUTO: 208 K/UL — SIGNIFICANT CHANGE UP (ref 150–400)
RBC # BLD: 4.7 M/UL — SIGNIFICANT CHANGE UP (ref 3.8–5.2)
RBC # FLD: 12.8 % — SIGNIFICANT CHANGE UP (ref 10.3–14.5)
WBC # BLD: 8.48 K/UL — SIGNIFICANT CHANGE UP (ref 3.8–10.5)
WBC # FLD AUTO: 8.48 K/UL — SIGNIFICANT CHANGE UP (ref 3.8–10.5)

## 2021-11-12 PROCEDURE — 99214 OFFICE O/P EST MOD 30 MIN: CPT

## 2021-11-12 RX ORDER — BLOOD SUGAR DIAGNOSTIC
STRIP MISCELLANEOUS
Qty: 200 | Refills: 1 | Status: ACTIVE | COMMUNITY
Start: 2021-11-12 | End: 1900-01-01

## 2021-11-13 LAB
ALBUMIN SERPL ELPH-MCNC: 4.4 G/DL — SIGNIFICANT CHANGE UP (ref 3.3–5)
ALP SERPL-CCNC: 95 U/L — SIGNIFICANT CHANGE UP (ref 40–120)
ALT FLD-CCNC: 12 U/L — SIGNIFICANT CHANGE UP (ref 10–45)
ANION GAP SERPL CALC-SCNC: 12 MMOL/L — SIGNIFICANT CHANGE UP (ref 5–17)
AST SERPL-CCNC: 16 U/L — SIGNIFICANT CHANGE UP (ref 10–40)
B2 MICROGLOB SERPL-MCNC: 3.1 MG/L — HIGH (ref 0.8–2.2)
BILIRUB SERPL-MCNC: 0.4 MG/DL — SIGNIFICANT CHANGE UP (ref 0.2–1.2)
BUN SERPL-MCNC: 16 MG/DL — SIGNIFICANT CHANGE UP (ref 7–23)
CALCIUM SERPL-MCNC: 10.1 MG/DL — SIGNIFICANT CHANGE UP (ref 8.4–10.5)
CEA SERPL-MCNC: 1.4 NG/ML — SIGNIFICANT CHANGE UP (ref 0–3.8)
CHLORIDE SERPL-SCNC: 105 MMOL/L — SIGNIFICANT CHANGE UP (ref 96–108)
CO2 SERPL-SCNC: 24 MMOL/L — SIGNIFICANT CHANGE UP (ref 22–31)
CREAT SERPL-MCNC: 0.83 MG/DL — SIGNIFICANT CHANGE UP (ref 0.5–1.3)
GLUCOSE SERPL-MCNC: 132 MG/DL — HIGH (ref 70–99)
LDH SERPL L TO P-CCNC: 155 U/L — SIGNIFICANT CHANGE UP (ref 50–242)
POTASSIUM SERPL-MCNC: 4.4 MMOL/L — SIGNIFICANT CHANGE UP (ref 3.5–5.3)
POTASSIUM SERPL-SCNC: 4.4 MMOL/L — SIGNIFICANT CHANGE UP (ref 3.5–5.3)
PROT SERPL-MCNC: 6.8 G/DL — SIGNIFICANT CHANGE UP (ref 6–8.3)
SODIUM SERPL-SCNC: 142 MMOL/L — SIGNIFICANT CHANGE UP (ref 135–145)

## 2021-12-08 ENCOUNTER — TRANSCRIPTION ENCOUNTER (OUTPATIENT)
Age: 81
End: 2021-12-08

## 2021-12-09 ENCOUNTER — APPOINTMENT (OUTPATIENT)
Dept: SURGERY | Facility: CLINIC | Age: 81
End: 2021-12-09
Payer: MEDICARE

## 2021-12-09 ENCOUNTER — NON-APPOINTMENT (OUTPATIENT)
Age: 81
End: 2021-12-09

## 2021-12-09 DIAGNOSIS — E03.9 HYPOTHYROIDISM, UNSPECIFIED: ICD-10-CM

## 2021-12-09 DIAGNOSIS — E04.1 NONTOXIC SINGLE THYROID NODULE: ICD-10-CM

## 2021-12-09 PROCEDURE — 99204 OFFICE O/P NEW MOD 45 MIN: CPT

## 2021-12-09 NOTE — REASON FOR VISIT
[Initial Consultation] : an initial consultation for [Other: _____] : [unfilled] [FreeTextEntry2] : a PET-avid and intermediate-risk left-sided indeterminate thyroid nodule in the setting of a right-sided PET-avid subcentimeter thyroid nodule

## 2021-12-09 NOTE — PHYSICAL EXAM
[Midline] : located in midline position [Normal] : orientation to person, place, and time: normal [de-identified] : Extremities: GOLD x 4.   Skin: No obvious skin lesions.   Voice: clear

## 2021-12-16 ENCOUNTER — TRANSCRIPTION ENCOUNTER (OUTPATIENT)
Age: 81
End: 2021-12-16

## 2021-12-27 ENCOUNTER — APPOINTMENT (OUTPATIENT)
Dept: ULTRASOUND IMAGING | Facility: CLINIC | Age: 81
End: 2021-12-27
Payer: MEDICARE

## 2021-12-27 ENCOUNTER — OUTPATIENT (OUTPATIENT)
Dept: OUTPATIENT SERVICES | Facility: HOSPITAL | Age: 81
LOS: 1 days | End: 2021-12-27
Payer: MEDICARE

## 2021-12-27 DIAGNOSIS — E04.2 NONTOXIC MULTINODULAR GOITER: ICD-10-CM

## 2021-12-27 PROCEDURE — 76536 US EXAM OF HEAD AND NECK: CPT

## 2021-12-27 PROCEDURE — 76536 US EXAM OF HEAD AND NECK: CPT | Mod: 26

## 2022-01-04 ENCOUNTER — APPOINTMENT (OUTPATIENT)
Dept: INTERNAL MEDICINE | Facility: CLINIC | Age: 82
End: 2022-01-04
Payer: MEDICARE

## 2022-01-04 PROCEDURE — 99214 OFFICE O/P EST MOD 30 MIN: CPT | Mod: CS,95

## 2022-01-05 ENCOUNTER — TRANSCRIPTION ENCOUNTER (OUTPATIENT)
Age: 82
End: 2022-01-05

## 2022-01-05 LAB — SARS-COV-2 N GENE NPH QL NAA+PROBE: DETECTED

## 2022-01-05 NOTE — ASSESSMENT
[FreeTextEntry1] : Prescribed Promethazine DM 1 teaspoon every 8 hours for cough.\par Flonase nasal spray 1 spray each nostril at night.\par Increase hydration.\par Tylenol as needed for body ache and fever.\par Monitor breathing any worsening of symptoms advised to go to the ER.\par \par Talked to patient this morning as her Covid PCR test is positive.\par Patient is doing much better. Her cough have subsided.\par Denied any difficulty breathing, no fever.\par

## 2022-01-05 NOTE — HISTORY OF PRESENT ILLNESS
[Home] : at home, [unfilled] , at the time of the visit. [Medical Office: ([facility])___] : at the medical office located in  [Verbal consent obtained from patient] : the patient, [unfilled] [FreeTextEntry8] : Ms. TAVARES DIAZ  as she is having stuffy nose, and cough since jan 1st.\par they had a [employer] gathering at home , and after that everybody got sick.\par they are waiting for the result of there covid test.\par pt. stated her cough is much better today.\par denied sob, ha, diarrhea.\par she feels tired and sleepy. \par Pt. is over all feeling well.\par

## 2022-01-05 NOTE — REVIEW OF SYSTEMS
[Fatigue] : fatigue [Cough] : cough [Negative] : Neurological [Fever] : no fever [Chills] : no chills [Night Sweats] : no night sweats [Shortness Of Breath] : no shortness of breath [Wheezing] : no wheezing [Dyspnea on Exertion] : no dyspnea on exertion

## 2022-01-06 ENCOUNTER — TRANSCRIPTION ENCOUNTER (OUTPATIENT)
Age: 82
End: 2022-01-06

## 2022-01-06 ENCOUNTER — APPOINTMENT (OUTPATIENT)
Dept: ENDOCRINOLOGY | Facility: CLINIC | Age: 82
End: 2022-01-06
Payer: MEDICARE

## 2022-01-06 ENCOUNTER — NON-APPOINTMENT (OUTPATIENT)
Age: 82
End: 2022-01-06

## 2022-01-06 DIAGNOSIS — Z86.39 PERSONAL HISTORY OF OTHER ENDOCRINE, NUTRITIONAL AND METABOLIC DISEASE: ICD-10-CM

## 2022-01-06 PROCEDURE — 99214 OFFICE O/P EST MOD 30 MIN: CPT | Mod: 95

## 2022-01-17 PROBLEM — Z86.39 HISTORY OF HYPOTHYROIDISM: Status: RESOLVED | Noted: 2021-12-09 | Resolved: 2022-01-17

## 2022-01-20 ENCOUNTER — APPOINTMENT (OUTPATIENT)
Dept: INTERNAL MEDICINE | Facility: CLINIC | Age: 82
End: 2022-01-20
Payer: MEDICARE

## 2022-01-20 VITALS
TEMPERATURE: 97.5 F | BODY MASS INDEX: 31.41 KG/M2 | DIASTOLIC BLOOD PRESSURE: 90 MMHG | SYSTOLIC BLOOD PRESSURE: 153 MMHG | WEIGHT: 184 LBS | HEIGHT: 64 IN | OXYGEN SATURATION: 97 % | HEART RATE: 102 BPM | RESPIRATION RATE: 16 BRPM

## 2022-01-20 LAB — HBA1C MFR BLD HPLC: 6.9

## 2022-01-20 PROCEDURE — G0008: CPT

## 2022-01-20 PROCEDURE — 83036 HEMOGLOBIN GLYCOSYLATED A1C: CPT | Mod: QW

## 2022-01-20 PROCEDURE — 90662 IIV NO PRSV INCREASED AG IM: CPT

## 2022-01-20 PROCEDURE — 99214 OFFICE O/P EST MOD 30 MIN: CPT | Mod: 25

## 2022-01-20 RX ORDER — GLIPIZIDE AND METFORMIN HYDROCHLORIDE 2.5; 5 MG/1; MG/1
2.5-5 TABLET, FILM COATED ORAL
Qty: 90 | Refills: 1 | Status: DISCONTINUED | COMMUNITY
Start: 1900-01-01 | End: 2022-01-20

## 2022-01-20 NOTE — REVIEW OF SYSTEMS
[Muscle Pain] : muscle pain [Negative] : Neurological [Nosebleed] : no nosebleeds [Nasal Discharge] : no nasal discharge [Sore Throat] : no sore throat [Postnasal Drip] : no postnasal drip [Joint Pain] : no joint pain [Joint Stiffness] : no joint stiffness [Joint Swelling] : no joint swelling [Back Pain] : no back pain [FreeTextEntry4] : Abrasions on the nose.

## 2022-01-20 NOTE — HISTORY OF PRESENT ILLNESS
[FreeTextEntry8] : Ms. TAVARES DIAZ  is  here today with her daughter.  Her daughter tells me that Ms. Diaz tripped on the stairs of her driveway and fell down on her face.\par she have abrasions on her nose and right hand.  No sign of infection, no swelling.\par Type 2 diabetes seasonal glipizide-metformin 2.5-500 mg tablet twice a day and Januvia 100 mg once a day.\par Her daughter stated that she skips her morning dose most of the days as her blood sugar drops and she gets symptomatic.\par Hypertension on valsartan 80 mg once a day.\par Hypothyroid on levothyroxine 75 mcg once a day\par Hyperlipidemia on atorvastatin 10 mg.\par \par

## 2022-01-20 NOTE — PHYSICAL EXAM
[Normal] : normal gait, coordination grossly intact, no focal deficits and deep tendon reflexes were 2+ and symmetric [de-identified] : + Abrasion on the nose, 1 area looks slightly whitish in color.  Abrasion on the right hand.

## 2022-01-20 NOTE — ASSESSMENT
[FreeTextEntry1] : Abrasions s/p fall:\par Prescribed mupirocin ointment to apply 2-3 times a day.\par Cold compression for pain.\par Stop aspirin for 1 week.\par \par Hypertension:\par Blood pressure is elevated.\par Patient is asymptomatic.\par Increased valsartan to 160 mgqd.\par \par Type 2 diabetes:\par Currently on glipizide-metformin 2.5-500 mg 1 tablet twice daily Januvia 100 mg once a day.\par Patient is skipping morning dose of medication as low blood sugar.\par Hemoglobin A1c done today is 6.9.\par Discontinued glipizide-metformin combination pill.\par Prescribed metformin 800 qd,\par Sees endocrinology.\par \par Hypothyroidism, multiple thyroid nodules:\par Currently euthyroid on levothyroxine 75 mcg.\par Seen by surgeon, recommended surgery.

## 2022-02-03 ENCOUNTER — APPOINTMENT (OUTPATIENT)
Dept: SURGERY | Facility: CLINIC | Age: 82
End: 2022-02-03
Payer: MEDICARE

## 2022-02-03 PROCEDURE — 99213 OFFICE O/P EST LOW 20 MIN: CPT

## 2022-02-03 NOTE — PHYSICAL EXAM
[Midline] : located in midline position [Normal] : orientation to person, place, and time: normal [de-identified] : Extremities: GOLD x 4.   Skin: No obvious skin lesions.   Voice: clear

## 2022-02-16 ENCOUNTER — NON-APPOINTMENT (OUTPATIENT)
Age: 82
End: 2022-02-16

## 2022-02-17 ENCOUNTER — NON-APPOINTMENT (OUTPATIENT)
Age: 82
End: 2022-02-17

## 2022-02-24 ENCOUNTER — TRANSCRIPTION ENCOUNTER (OUTPATIENT)
Age: 82
End: 2022-02-24

## 2022-02-25 ENCOUNTER — APPOINTMENT (OUTPATIENT)
Dept: INTERNAL MEDICINE | Facility: CLINIC | Age: 82
End: 2022-02-25
Payer: MEDICARE

## 2022-02-25 DIAGNOSIS — J30.9 ALLERGIC RHINITIS, UNSPECIFIED: ICD-10-CM

## 2022-02-25 PROCEDURE — 99214 OFFICE O/P EST MOD 30 MIN: CPT | Mod: 95

## 2022-02-27 PROBLEM — J30.9 ALLERGIC RHINITIS: Status: ACTIVE | Noted: 2022-02-27

## 2022-02-27 NOTE — ASSESSMENT
[FreeTextEntry1] : Dysarthria:\par Referred to speech therapy.\par Patient has vascular dementia she is on memantine 10 mg twice daily.\par No behavioral changes.\par \par Allergic rhinitis:\par Gluteus azelastine 0.5% nasal solution 1 spray once a day.\par Renewed prescription.\par \par

## 2022-02-27 NOTE — HISTORY OF PRESENT ILLNESS
[Home] : at home, [unfilled] , at the time of the visit. [Medical Office: ([facility])___] : at the medical office located in  [Verbal consent obtained from patient] : the patient, [unfilled] [FreeTextEntry1] : Difficulty finding words.\par \par  [de-identified] : Ms. DIAZ had a telehealth visit she was present with her daughter.\par History was mostly aching given by the daughter.\par Patient has vascular dementia as per her daughter she was getting speech therapy for not able to find words and was doing better with therapy.\par Her therapy session ended.\par She is on memantine for dementia, she has difficulty in comprehension.\par

## 2022-03-14 ENCOUNTER — NON-APPOINTMENT (OUTPATIENT)
Age: 82
End: 2022-03-14

## 2022-03-15 ENCOUNTER — RESULT REVIEW (OUTPATIENT)
Age: 82
End: 2022-03-15

## 2022-03-15 ENCOUNTER — OUTPATIENT (OUTPATIENT)
Dept: OUTPATIENT SERVICES | Facility: HOSPITAL | Age: 82
LOS: 1 days | End: 2022-03-15
Payer: MEDICARE

## 2022-03-15 ENCOUNTER — APPOINTMENT (OUTPATIENT)
Dept: ULTRASOUND IMAGING | Facility: IMAGING CENTER | Age: 82
End: 2022-03-15
Payer: MEDICARE

## 2022-03-15 DIAGNOSIS — E04.2 NONTOXIC MULTINODULAR GOITER: ICD-10-CM

## 2022-03-15 PROCEDURE — 88173 CYTOPATH EVAL FNA REPORT: CPT

## 2022-03-15 PROCEDURE — 88173 CYTOPATH EVAL FNA REPORT: CPT | Mod: 26

## 2022-03-15 PROCEDURE — 88172 CYTP DX EVAL FNA 1ST EA SITE: CPT

## 2022-03-15 PROCEDURE — 10005 FNA BX W/US GDN 1ST LES: CPT

## 2022-03-15 PROCEDURE — 10006 FNA BX W/US GDN EA ADDL: CPT | Mod: 59

## 2022-03-15 PROCEDURE — 10006 FNA BX W/US GDN EA ADDL: CPT

## 2022-03-17 ENCOUNTER — NON-APPOINTMENT (OUTPATIENT)
Age: 82
End: 2022-03-17

## 2022-03-28 ENCOUNTER — OUTPATIENT (OUTPATIENT)
Dept: OUTPATIENT SERVICES | Facility: HOSPITAL | Age: 82
LOS: 1 days | Discharge: HOME | End: 2022-03-28

## 2022-03-28 ENCOUNTER — RX RENEWAL (OUTPATIENT)
Age: 82
End: 2022-03-28

## 2022-03-28 ENCOUNTER — TRANSCRIPTION ENCOUNTER (OUTPATIENT)
Age: 82
End: 2022-03-28

## 2022-03-28 DIAGNOSIS — C18.9 MALIGNANT NEOPLASM OF COLON, UNSPECIFIED: ICD-10-CM

## 2022-03-28 DIAGNOSIS — Z01.818 ENCOUNTER FOR OTHER PREPROCEDURAL EXAMINATION: ICD-10-CM

## 2022-03-28 DIAGNOSIS — Z11.59 ENCOUNTER FOR SCREENING FOR OTHER VIRAL DISEASES: ICD-10-CM

## 2022-03-28 DIAGNOSIS — Z12.11 ENCOUNTER FOR SCREENING FOR MALIGNANT NEOPLASM OF COLON: ICD-10-CM

## 2022-03-29 ENCOUNTER — RESULT REVIEW (OUTPATIENT)
Age: 82
End: 2022-03-29

## 2022-03-29 ENCOUNTER — APPOINTMENT (OUTPATIENT)
Dept: HEMATOLOGY ONCOLOGY | Facility: CLINIC | Age: 82
End: 2022-03-29
Payer: MEDICARE

## 2022-03-29 ENCOUNTER — TRANSCRIPTION ENCOUNTER (OUTPATIENT)
Age: 82
End: 2022-03-29

## 2022-03-29 VITALS — WEIGHT: 187 LBS | BODY MASS INDEX: 32.1 KG/M2

## 2022-03-29 LAB
ALBUMIN SERPL ELPH-MCNC: 4.5 G/DL — SIGNIFICANT CHANGE UP (ref 3.3–5)
ALP SERPL-CCNC: 104 U/L — SIGNIFICANT CHANGE UP (ref 40–120)
ALT FLD-CCNC: 15 U/L — SIGNIFICANT CHANGE UP (ref 10–45)
ANION GAP SERPL CALC-SCNC: 13 MMOL/L — SIGNIFICANT CHANGE UP (ref 5–17)
AST SERPL-CCNC: 15 U/L — SIGNIFICANT CHANGE UP (ref 10–40)
BASOPHILS # BLD AUTO: 0.01 K/UL — SIGNIFICANT CHANGE UP (ref 0–0.2)
BASOPHILS NFR BLD AUTO: 0.1 % — SIGNIFICANT CHANGE UP (ref 0–2)
BILIRUB SERPL-MCNC: 0.4 MG/DL — SIGNIFICANT CHANGE UP (ref 0.2–1.2)
BUN SERPL-MCNC: 16 MG/DL — SIGNIFICANT CHANGE UP (ref 7–23)
CALCIUM SERPL-MCNC: 9.9 MG/DL — SIGNIFICANT CHANGE UP (ref 8.4–10.5)
CHLORIDE SERPL-SCNC: 102 MMOL/L — SIGNIFICANT CHANGE UP (ref 96–108)
CO2 SERPL-SCNC: 25 MMOL/L — SIGNIFICANT CHANGE UP (ref 22–31)
CREAT SERPL-MCNC: 0.79 MG/DL — SIGNIFICANT CHANGE UP (ref 0.5–1.3)
EGFR: 74 ML/MIN/1.73M2 — SIGNIFICANT CHANGE UP
EOSINOPHIL # BLD AUTO: 0.08 K/UL — SIGNIFICANT CHANGE UP (ref 0–0.5)
EOSINOPHIL NFR BLD AUTO: 1 % — SIGNIFICANT CHANGE UP (ref 0–6)
GLUCOSE SERPL-MCNC: 248 MG/DL — HIGH (ref 70–99)
HCT VFR BLD CALC: 41.9 % — SIGNIFICANT CHANGE UP (ref 34.5–45)
HGB BLD-MCNC: 13.8 G/DL — SIGNIFICANT CHANGE UP (ref 11.5–15.5)
IMM GRANULOCYTES NFR BLD AUTO: 0.1 % — SIGNIFICANT CHANGE UP (ref 0–1.5)
LDH SERPL L TO P-CCNC: 170 U/L — SIGNIFICANT CHANGE UP (ref 50–242)
LYMPHOCYTES # BLD AUTO: 2.52 K/UL — SIGNIFICANT CHANGE UP (ref 1–3.3)
LYMPHOCYTES # BLD AUTO: 33.1 % — SIGNIFICANT CHANGE UP (ref 13–44)
MCHC RBC-ENTMCNC: 29.4 PG — SIGNIFICANT CHANGE UP (ref 27–34)
MCHC RBC-ENTMCNC: 32.9 GM/DL — SIGNIFICANT CHANGE UP (ref 32–36)
MCV RBC AUTO: 89.3 FL — SIGNIFICANT CHANGE UP (ref 80–100)
MONOCYTES # BLD AUTO: 0.5 K/UL — SIGNIFICANT CHANGE UP (ref 0–0.9)
MONOCYTES NFR BLD AUTO: 6.6 % — SIGNIFICANT CHANGE UP (ref 2–14)
NEUTROPHILS # BLD AUTO: 4.5 K/UL — SIGNIFICANT CHANGE UP (ref 1.8–7.4)
NEUTROPHILS NFR BLD AUTO: 59.1 % — SIGNIFICANT CHANGE UP (ref 43–77)
NRBC # BLD: 0 /100 WBCS — SIGNIFICANT CHANGE UP (ref 0–0)
PLATELET # BLD AUTO: 201 K/UL — SIGNIFICANT CHANGE UP (ref 150–400)
POTASSIUM SERPL-MCNC: 4.6 MMOL/L — SIGNIFICANT CHANGE UP (ref 3.5–5.3)
POTASSIUM SERPL-SCNC: 4.6 MMOL/L — SIGNIFICANT CHANGE UP (ref 3.5–5.3)
PROT SERPL-MCNC: 7 G/DL — SIGNIFICANT CHANGE UP (ref 6–8.3)
RBC # BLD: 4.69 M/UL — SIGNIFICANT CHANGE UP (ref 3.8–5.2)
RBC # FLD: 12.8 % — SIGNIFICANT CHANGE UP (ref 10.3–14.5)
SODIUM SERPL-SCNC: 140 MMOL/L — SIGNIFICANT CHANGE UP (ref 135–145)
WBC # BLD: 7.62 K/UL — SIGNIFICANT CHANGE UP (ref 3.8–10.5)
WBC # FLD AUTO: 7.62 K/UL — SIGNIFICANT CHANGE UP (ref 3.8–10.5)

## 2022-03-29 PROCEDURE — 99214 OFFICE O/P EST MOD 30 MIN: CPT

## 2022-03-29 NOTE — REASON FOR VISIT
[Follow-Up Visit] : a follow-up visit for [FreeTextEntry2] : extranodal marginal zone lymphoma diagnosed in 2019

## 2022-03-29 NOTE — HISTORY OF PRESENT ILLNESS
[de-identified] : 81 F seen previously at [facility], now moved to .\par \par Has hx of colon cancer 2017- 1/15/17 right hemicolectomy pT3, N0  ( 0/16)  mod diff adeno of sigmoid flexure MSI intact \par Follow up scans for surveillance after colon cancer in July 2019 showed pleural plaque left paraspinal at low thoracic  level,  avid on PET. [facility] pleural biopsy  8/14/19  : extranodal marginal zone lymphoma  ( MALT)  ( CD 20, CD 10, CD 23, CD19, CD 45 positive ki67 < 3 %  . No systemic tx  was necessary- active surveillance \par  \par 2021- found to have multiple thyroid nodules avid on PET scan. 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  Per molecular panel- risk of  malignancy 50 % . Biopsy of few additional smaller nodules- benign.\par \par Thyroidectomy was discussed ( endocrinology/ head and neck surgery) . Patient opted for monitoring.\par \par  [de-identified] : Feels well . Active, no systemic sx, no pain, no bone pain.  colonoscopy 2021- OK. \par \par

## 2022-03-29 NOTE — REASON FOR VISIT
[Follow-Up Visit] : a follow-up visit for [FreeTextEntry2] : marginal zone lymphoma diagnosed in 2019

## 2022-03-29 NOTE — PHYSICAL EXAM
[Fully active, able to carry on all pre-disease performance without restriction] : Status 0 - Fully active, able to carry on all pre-disease performance without restriction [Normal] : affect appropriate [de-identified] : looks well  [de-identified] : cannot  palpate thyroid nodules

## 2022-03-29 NOTE — PHYSICAL EXAM
[Fully active, able to carry on all pre-disease performance without restriction] : Status 0 - Fully active, able to carry on all pre-disease performance without restriction [Normal] : clear to auscultation bilaterally, no dullness, no wheezing [de-identified] : looks well  [de-identified] : cannot  palpate thyroid nodules  [de-identified] : trace ankle edema

## 2022-03-29 NOTE — ASSESSMENT
[FreeTextEntry1] : 80 y/o female with hx of stage II colon cancer s/p R hemicolectomy 2017- DIALLO . \par Surveillance colonoscopy up to date. Routine scans not indicated. \par \par Hx of MALT lymphoma- paraspinal/ pleural based and ? avid  bone lesions on PET - documented since at least 2019. 2021 PET :reviewed with radiology  - slight progression . Clinically not symptomatic. Will continue to monitor clinically.\par \par Avid thyroid nodules. 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 Will f/u with endocrinology and head and neck surgery. \par \par Discussed with patient  and her daughter. \par return visit in 4 months sooner PRN

## 2022-03-29 NOTE — HISTORY OF PRESENT ILLNESS
[de-identified] : 81 F seen previously at [facility], now moved to .\par \par Has hx of colon cancer 2017- 1/15/17 right hemicolectomy pT3, N0  ( 0/16)  mod diff adeno of sigmoid flexure MSI intact \par Follow up scans for surveillance after colon cancer in July 2019 showed pleural plaque left paraspinal at low thoracic  level,  avid on PET. [facility] pleural biopsy  8/14/19  : extranodal marginal zone lymphoma  ( MALT)  ( CD 20, CD 10, CD 23, CD19, CD 45 positive ki67 < 3 %  . No systemic tx  was necessary- active surveillance \par  \par \par  [de-identified] : Feels well . Active, no systemic sx, no pain, no bone pain. Colonoscopy this year with Dr Scott rudolph Had recent thyroid aspiration biopsy for avid on PET thyroid nodules.

## 2022-03-30 DIAGNOSIS — C88.4 EXTRANODAL MARGINAL ZONE B-CELL LYMPHOMA OF MUCOSA-ASSOCIATED LYMPHOID TISSUE [MALT-LYMPHOMA]: ICD-10-CM

## 2022-03-30 DIAGNOSIS — Z85.038 PERSONAL HISTORY OF OTHER MALIGNANT NEOPLASM OF LARGE INTESTINE: ICD-10-CM

## 2022-04-11 PROBLEM — Z11.59 SCREENING FOR VIRAL DISEASE: Status: RESOLVED | Noted: 2022-01-03 | Resolved: 2022-03-28

## 2022-04-13 LAB — THYROSEQ RESULT: SIGNIFICANT CHANGE UP

## 2022-04-18 ENCOUNTER — APPOINTMENT (OUTPATIENT)
Dept: SURGERY | Facility: CLINIC | Age: 82
End: 2022-04-18
Payer: MEDICARE

## 2022-04-18 PROCEDURE — 99212 OFFICE O/P EST SF 10 MIN: CPT

## 2022-04-18 NOTE — REASON FOR VISIT
[Other:____] : [unfilled] [Verbal consent obtained from patient] : the patient, [unfilled] [Follow-Up: _____] : a [unfilled] follow-up visit

## 2022-05-12 ENCOUNTER — RX RENEWAL (OUTPATIENT)
Age: 82
End: 2022-05-12

## 2022-05-18 ENCOUNTER — NON-APPOINTMENT (OUTPATIENT)
Age: 82
End: 2022-05-18

## 2022-05-19 ENCOUNTER — NON-APPOINTMENT (OUTPATIENT)
Age: 82
End: 2022-05-19

## 2022-05-20 ENCOUNTER — NON-APPOINTMENT (OUTPATIENT)
Age: 82
End: 2022-05-20

## 2022-06-13 ENCOUNTER — TRANSCRIPTION ENCOUNTER (OUTPATIENT)
Age: 82
End: 2022-06-13

## 2022-07-05 ENCOUNTER — RX RENEWAL (OUTPATIENT)
Age: 82
End: 2022-07-05

## 2022-07-06 ENCOUNTER — TRANSCRIPTION ENCOUNTER (OUTPATIENT)
Age: 82
End: 2022-07-06

## 2022-07-07 ENCOUNTER — TRANSCRIPTION ENCOUNTER (OUTPATIENT)
Age: 82
End: 2022-07-07

## 2022-07-19 ENCOUNTER — APPOINTMENT (OUTPATIENT)
Dept: INTERNAL MEDICINE | Facility: CLINIC | Age: 82
End: 2022-07-19

## 2022-07-19 VITALS
HEART RATE: 80 BPM | HEIGHT: 64 IN | TEMPERATURE: 97.8 F | SYSTOLIC BLOOD PRESSURE: 149 MMHG | WEIGHT: 184 LBS | DIASTOLIC BLOOD PRESSURE: 86 MMHG | OXYGEN SATURATION: 95 % | RESPIRATION RATE: 15 BRPM | BODY MASS INDEX: 31.41 KG/M2

## 2022-07-19 DIAGNOSIS — R47.1 DYSARTHRIA AND ANARTHRIA: ICD-10-CM

## 2022-07-19 PROCEDURE — 90677 PCV20 VACCINE IM: CPT

## 2022-07-19 PROCEDURE — 90471 IMMUNIZATION ADMIN: CPT

## 2022-07-19 PROCEDURE — 83036 HEMOGLOBIN GLYCOSYLATED A1C: CPT | Mod: QW

## 2022-07-19 PROCEDURE — G0009: CPT | Mod: 59

## 2022-07-19 PROCEDURE — 99214 OFFICE O/P EST MOD 30 MIN: CPT | Mod: 25

## 2022-07-20 ENCOUNTER — RESULT CHARGE (OUTPATIENT)
Age: 82
End: 2022-07-20

## 2022-07-20 LAB — HBA1C MFR BLD HPLC: 8.1

## 2022-07-20 RX ORDER — PROMETHAZINE HYDROCHLORIDE AND DEXTROMETHORPHAN HYDROBROMIDE ORAL SOLUTION 15; 6.25 MG/5ML; MG/5ML
6.25-15 SOLUTION ORAL EVERY 6 HOURS
Qty: 120 | Refills: 0 | Status: DISCONTINUED | COMMUNITY
Start: 2022-01-04 | End: 2022-07-20

## 2022-07-20 RX ORDER — FIRST AID TRIPLE ANTIBIOTIC 400; 3.5; 5 [USP'U]/G; MG/G; [USP'U]/G
OINTMENT TOPICAL TWICE DAILY
Qty: 5 | Refills: 0 | Status: DISCONTINUED | COMMUNITY
Start: 2022-01-21 | End: 2022-07-20

## 2022-07-20 NOTE — REVIEW OF SYSTEMS
[Memory Loss] : memory loss [Negative] : Respiratory [Headache] : no headache [Dizziness] : no dizziness [Fainting] : no fainting [Confusion] : no confusion

## 2022-07-20 NOTE — HISTORY OF PRESENT ILLNESS
[Home] : at home, [unfilled] , at the time of the visit. [Medical Office: ([facility])___] : at the medical office located in  [Verbal consent obtained from patient] : the patient, [unfilled] [FreeTextEntry1] : Here for f/u  type 2 dm, HTN. [de-identified] : Ms. DIAZ is here for a f/u.\par She is here today with son in law and grand son.\par pt. have dementia.family members are talking for her.\par type 2 DM , she is on metformin 850 qd ,Januvia 100 mg \par She is overall doing well.\par Blood pressure is fairly controlled.\par Son-in-law stated that her dementia is getting worse, asking for referral to neurology.  Have not seen speech therapy yet.

## 2022-07-20 NOTE — ASSESSMENT
[FreeTextEntry1] : Dysarthria:\par Information given for speech therapy.\par \par Dementia:\par Patient has vascular dementia she is on memantine 10 mg twice daily.\par No behavioral changes.\par Refer to neurology.\par \par Hypertension:\par Blood pressure is fairly controlled on valsartan 160 mg.\par Advised to continue low-salt diet.\par \par Type 2 diabetes:\par A1c in office 8.1.\par I will increase metformin to 850 mg twice a day continue with Januvia 100 mg once a day.\par Advised to cut down on carbs and sugar.\par \par Follow-up in 3 months.

## 2022-07-21 ENCOUNTER — APPOINTMENT (OUTPATIENT)
Dept: INTERNAL MEDICINE | Facility: CLINIC | Age: 82
End: 2022-07-21

## 2022-07-21 VITALS
SYSTOLIC BLOOD PRESSURE: 119 MMHG | WEIGHT: 184 LBS | RESPIRATION RATE: 15 BRPM | HEART RATE: 75 BPM | HEIGHT: 64 IN | BODY MASS INDEX: 31.41 KG/M2 | OXYGEN SATURATION: 96 % | TEMPERATURE: 97.8 F | DIASTOLIC BLOOD PRESSURE: 75 MMHG

## 2022-07-21 PROCEDURE — 99213 OFFICE O/P EST LOW 20 MIN: CPT

## 2022-07-22 NOTE — ASSESSMENT
[FreeTextEntry1] : Dementia:\par Patient has vascular dementia she is on memantine 10 mg twice daily.\par No behavioral changes.\par \par Hypertension:\par Blood pressure is fairly controlled on valsartan 160 mg.\par Advised to continue low-salt diet.\par \par Type 2 diabetes:\par A1c in office 8.1.\par e metformin to 850 mg was increased to twice a day continue with Januvia 100 mg once a day.\par reassurance given to the daughter, monitor blood sugar once a day.\par avoid fast foods and sweets.\par \par Follow-up in 3 months.\par \par phlebitis:\par erythematous rash on the injection sight.\par advised , ice compression 3 times a day.\par Tylenol for inflammation and pain.

## 2022-07-22 NOTE — HISTORY OF PRESENT ILLNESS
[FreeTextEntry8] : Ms. DIAZ is here with her daughter as her blood sugar is not well controlled.\par pt. have dementia , history got from the daughter, who tells me that her mom had not eating any thing this morning and have started taking metformin 850 mg bid since yesterday and this afternoon her finger stick was 287 in empty stomach.\par The daughter admits she had eaten from [employer] last night .\par She also have a concern that the injection sight where she got Prevnar vaccine last visit , is red and painful , she wanted to make sure it is not infected.\par

## 2022-07-22 NOTE — PHYSICAL EXAM
[Normal] : normal rate, regular rhythm, normal S1 and S2 and no murmur heard [de-identified] : erythemtous sking in the left arm at the injection sight , no induration , mild tenderness to touch .

## 2022-07-31 ENCOUNTER — OUTPATIENT (OUTPATIENT)
Dept: OUTPATIENT SERVICES | Facility: HOSPITAL | Age: 82
LOS: 1 days | Discharge: HOME | End: 2022-07-31

## 2022-07-31 DIAGNOSIS — C18.9 MALIGNANT NEOPLASM OF COLON, UNSPECIFIED: ICD-10-CM

## 2022-08-08 ENCOUNTER — APPOINTMENT (OUTPATIENT)
Dept: OBGYN | Facility: CLINIC | Age: 82
End: 2022-08-08

## 2022-08-08 VITALS
WEIGHT: 185 LBS | DIASTOLIC BLOOD PRESSURE: 70 MMHG | BODY MASS INDEX: 31.76 KG/M2 | TEMPERATURE: 98 F | SYSTOLIC BLOOD PRESSURE: 122 MMHG

## 2022-08-08 DIAGNOSIS — Z12.31 ENCOUNTER FOR SCREENING MAMMOGRAM FOR MALIGNANT NEOPLASM OF BREAST: ICD-10-CM

## 2022-08-08 DIAGNOSIS — R92.2 INCONCLUSIVE MAMMOGRAM: ICD-10-CM

## 2022-08-08 PROCEDURE — G0101: CPT

## 2022-08-08 NOTE — HISTORY OF PRESENT ILLNESS
[FreeTextEntry1] : 83 yo presents with her daughter pt has dementia, She has nopmp vaginal bleeding, last colonoscopy sept 2021, doing well.

## 2022-08-19 DIAGNOSIS — U07.1 COVID-19: ICD-10-CM

## 2022-08-24 ENCOUNTER — RESULT REVIEW (OUTPATIENT)
Age: 82
End: 2022-08-24

## 2022-08-24 ENCOUNTER — APPOINTMENT (OUTPATIENT)
Dept: HEMATOLOGY ONCOLOGY | Facility: CLINIC | Age: 82
End: 2022-08-24

## 2022-08-24 VITALS
OXYGEN SATURATION: 97 % | HEIGHT: 64.96 IN | BODY MASS INDEX: 30.06 KG/M2 | TEMPERATURE: 97.2 F | SYSTOLIC BLOOD PRESSURE: 128 MMHG | DIASTOLIC BLOOD PRESSURE: 74 MMHG | WEIGHT: 180.44 LBS | HEART RATE: 108 BPM | RESPIRATION RATE: 18 BRPM

## 2022-08-24 DIAGNOSIS — R47.01 APHASIA: ICD-10-CM

## 2022-08-24 DIAGNOSIS — Z87.898 PERSONAL HISTORY OF OTHER SPECIFIED CONDITIONS: ICD-10-CM

## 2022-08-24 DIAGNOSIS — Z85.72 PERSONAL HISTORY OF NON-HODGKIN LYMPHOMAS: ICD-10-CM

## 2022-08-24 DIAGNOSIS — Z87.09 PERSONAL HISTORY OF OTHER DISEASES OF THE RESPIRATORY SYSTEM: ICD-10-CM

## 2022-08-24 DIAGNOSIS — Z87.828 PERSONAL HISTORY OF OTHER (HEALED) PHYSICAL INJURY AND TRAUMA: ICD-10-CM

## 2022-08-24 DIAGNOSIS — R09.82 POSTNASAL DRIP: ICD-10-CM

## 2022-08-24 DIAGNOSIS — I80.8 PHLEBITIS AND THROMBOPHLEBITIS OF OTHER SITES: ICD-10-CM

## 2022-08-24 DIAGNOSIS — Z86.19 PERSONAL HISTORY OF OTHER INFECTIOUS AND PARASITIC DISEASES: ICD-10-CM

## 2022-08-24 LAB
BASOPHILS # BLD AUTO: 0.01 K/UL — SIGNIFICANT CHANGE UP (ref 0–0.2)
BASOPHILS NFR BLD AUTO: 0.1 % — SIGNIFICANT CHANGE UP (ref 0–2)
EOSINOPHIL # BLD AUTO: 0.1 K/UL — SIGNIFICANT CHANGE UP (ref 0–0.5)
EOSINOPHIL NFR BLD AUTO: 1.3 % — SIGNIFICANT CHANGE UP (ref 0–6)
HCT VFR BLD CALC: 42.2 % — SIGNIFICANT CHANGE UP (ref 34.5–45)
HGB BLD-MCNC: 14.1 G/DL — SIGNIFICANT CHANGE UP (ref 11.5–15.5)
IMM GRANULOCYTES NFR BLD AUTO: 0.3 % — SIGNIFICANT CHANGE UP (ref 0–1.5)
LYMPHOCYTES # BLD AUTO: 2.13 K/UL — SIGNIFICANT CHANGE UP (ref 1–3.3)
LYMPHOCYTES # BLD AUTO: 28.5 % — SIGNIFICANT CHANGE UP (ref 13–44)
MCHC RBC-ENTMCNC: 29.8 PG — SIGNIFICANT CHANGE UP (ref 27–34)
MCHC RBC-ENTMCNC: 33.4 GM/DL — SIGNIFICANT CHANGE UP (ref 32–36)
MCV RBC AUTO: 89.2 FL — SIGNIFICANT CHANGE UP (ref 80–100)
MONOCYTES # BLD AUTO: 0.5 K/UL — SIGNIFICANT CHANGE UP (ref 0–0.9)
MONOCYTES NFR BLD AUTO: 6.7 % — SIGNIFICANT CHANGE UP (ref 2–14)
NEUTROPHILS # BLD AUTO: 4.72 K/UL — SIGNIFICANT CHANGE UP (ref 1.8–7.4)
NEUTROPHILS NFR BLD AUTO: 63.1 % — SIGNIFICANT CHANGE UP (ref 43–77)
NRBC # BLD: 0 /100 WBCS — SIGNIFICANT CHANGE UP (ref 0–0)
PLATELET # BLD AUTO: 220 K/UL — SIGNIFICANT CHANGE UP (ref 150–400)
RBC # BLD: 4.73 M/UL — SIGNIFICANT CHANGE UP (ref 3.8–5.2)
RBC # FLD: 12.7 % — SIGNIFICANT CHANGE UP (ref 10.3–14.5)
WBC # BLD: 7.48 K/UL — SIGNIFICANT CHANGE UP (ref 3.8–10.5)
WBC # FLD AUTO: 7.48 K/UL — SIGNIFICANT CHANGE UP (ref 3.8–10.5)

## 2022-08-24 PROCEDURE — 99214 OFFICE O/P EST MOD 30 MIN: CPT

## 2022-08-25 DIAGNOSIS — C79.51 SECONDARY MALIGNANT NEOPLASM OF BONE: ICD-10-CM

## 2022-08-25 DIAGNOSIS — C88.4 EXTRANODAL MARGINAL ZONE B-CELL LYMPHOMA OF MUCOSA-ASSOCIATED LYMPHOID TISSUE [MALT-LYMPHOMA]: ICD-10-CM

## 2022-08-25 DIAGNOSIS — Z85.038 PERSONAL HISTORY OF OTHER MALIGNANT NEOPLASM OF LARGE INTESTINE: ICD-10-CM

## 2022-08-25 PROBLEM — I80.8 PHLEBITIS OF ARM: Status: RESOLVED | Noted: 2022-07-22 | Resolved: 2022-08-25

## 2022-08-25 PROBLEM — Z87.898 HISTORY OF DYSURIA: Status: RESOLVED | Noted: 2021-07-20 | Resolved: 2022-03-28

## 2022-08-25 PROBLEM — Z85.72 HISTORY OF NON-HODGKIN'S LYMPHOMA: Status: RESOLVED | Noted: 2021-05-10 | Resolved: 2022-08-25

## 2022-08-25 PROBLEM — Z87.828 HISTORY OF ABRASION: Status: RESOLVED | Noted: 2022-01-20 | Resolved: 2022-08-25

## 2022-08-25 PROBLEM — Z87.09 HISTORY OF PERSISTENT COUGH: Status: RESOLVED | Noted: 2022-01-04 | Resolved: 2022-08-25

## 2022-08-25 PROBLEM — R47.01 APHASIA, MIXED: Status: ACTIVE | Noted: 2022-08-25

## 2022-08-25 PROBLEM — R09.82 POST-NASAL DRAINAGE: Status: RESOLVED | Noted: 2022-01-04 | Resolved: 2022-08-25

## 2022-08-25 LAB
ALBUMIN SERPL ELPH-MCNC: 4.4 G/DL — SIGNIFICANT CHANGE UP (ref 3.3–5)
ALP SERPL-CCNC: 84 U/L — SIGNIFICANT CHANGE UP (ref 40–120)
ALT FLD-CCNC: 13 U/L — SIGNIFICANT CHANGE UP (ref 10–45)
ANION GAP SERPL CALC-SCNC: 14 MMOL/L — SIGNIFICANT CHANGE UP (ref 5–17)
AST SERPL-CCNC: 15 U/L — SIGNIFICANT CHANGE UP (ref 10–40)
BILIRUB SERPL-MCNC: 0.4 MG/DL — SIGNIFICANT CHANGE UP (ref 0.2–1.2)
BUN SERPL-MCNC: 17 MG/DL — SIGNIFICANT CHANGE UP (ref 7–23)
CALCIUM SERPL-MCNC: 10.2 MG/DL — SIGNIFICANT CHANGE UP (ref 8.4–10.5)
CHLORIDE SERPL-SCNC: 103 MMOL/L — SIGNIFICANT CHANGE UP (ref 96–108)
CO2 SERPL-SCNC: 25 MMOL/L — SIGNIFICANT CHANGE UP (ref 22–31)
CREAT SERPL-MCNC: 0.75 MG/DL — SIGNIFICANT CHANGE UP (ref 0.5–1.3)
EGFR: 78 ML/MIN/1.73M2 — SIGNIFICANT CHANGE UP
GLUCOSE SERPL-MCNC: 127 MG/DL — HIGH (ref 70–99)
LDH SERPL L TO P-CCNC: 161 U/L — SIGNIFICANT CHANGE UP (ref 50–242)
POTASSIUM SERPL-MCNC: 4.5 MMOL/L — SIGNIFICANT CHANGE UP (ref 3.5–5.3)
POTASSIUM SERPL-SCNC: 4.5 MMOL/L — SIGNIFICANT CHANGE UP (ref 3.5–5.3)
PROT SERPL-MCNC: 6.8 G/DL — SIGNIFICANT CHANGE UP (ref 6–8.3)
SODIUM SERPL-SCNC: 142 MMOL/L — SIGNIFICANT CHANGE UP (ref 135–145)

## 2022-08-25 RX ORDER — ACYCLOVIR 50 MG/G
5 OINTMENT TOPICAL 3 TIMES DAILY
Qty: 1 | Refills: 0 | Status: DISCONTINUED | COMMUNITY
Start: 2021-12-16 | End: 2022-08-25

## 2022-08-25 NOTE — REASON FOR VISIT
[Follow-Up Visit] : a follow-up visit for [Other: _____] : [unfilled] [FreeTextEntry2] : Extranodal marginal zone lymphoma diagnosed in 2019

## 2022-08-25 NOTE — PHYSICAL EXAM
[Normal] : affect appropriate [de-identified] : overweight, looks good for age [de-identified] : anicteric [de-identified] : no lymphadenopathy [de-identified] : no c/c/e [de-identified] : no rashes or bruising [de-identified] : +aphasia

## 2022-08-25 NOTE — REVIEW OF SYSTEMS
[Patient Intake Form Reviewed] : Patient intake form was reviewed [Night Sweats] : night sweats [Loss of Hearing] : loss of hearing [Easy Bruising] : a tendency for easy bruising [Negative] : Allergic/Immunologic [FreeTextEntry2] : gained few lbs [de-identified] : Memory loss

## 2022-08-25 NOTE — HISTORY OF PRESENT ILLNESS
[Disease:__________________________] : Disease: [unfilled] [de-identified] : TAVARES DIAZ is an 82 y.o. F with a PMH significant for HTN, HLD, Hypothyroidism, DM2, memory loss / aphasia, suspicious thyroid nodules, and colon cancer, who we are following for a MALT lymphoma diagnosed in 2019. \par \par 82 F seen previously at [facility], now moved to .\par \par She presented with a colon cancer 2017.\par 1/15/17 - Right hemicolectomy - mod diff adeno of sigmoid flexure, MSI intact,  pT3, N0 (0/16).  Did not need any adjuvant therapy.   \par \par 7/5/19 - Surveillance CT C/A/P - ***NEW enhancing pleural plaque along left paraspinal at T3 level.\par 8/6/19 - PET/CT -  Left sided pleural based soft tissue lesion adjacent to T3 vertebral body, SUV 6.5.  Cardiophrenic angle LN with SUV 3.4. Subcentimeter pulmonary nodules too small to characterize, non FDG avid.  No axillary, supraclavicular, or hilar LAD.  Mildly B/L PALN's (SUV 2.7).  Right proximal femur wit moderate uptake (SUV 7.7), and left scapula  and 3rd anterior right sided rib with mild focal uptake.  B/L thyroid gland nodules (SUV 7.5 on left). \par 8/14/19 - [facility] pleural biopsy -  Extranodal marginal zone lymphoma (MALT) - CD 20, CD 10, CD 23, CD 19, CD 45 positive, Ki 67 < 3 %. \par No systemic tx was necessary - active surveillance \par  \par 7/31/21 - PET/CT - Significant for multiple new bony foci and *** increased avidity of b/l thyroid lobe foci. \par 10/25/21 - Biopsy of 2.8cm LL pole nodule - Hurtle cell nodule.  Per molecular panel - NRAS pos - risk of malignancy 50%.  \par Subsequent biopsy of few additional smaller nodules - benign.\par \par Patient being followed by Endocrinology/ Head and Neck Surgery - Thyroidectomy was discussed, however due to her age and issues with memory patient and daughter have opted for surveillance. [de-identified] :  Extranodal marginal zone lymphoma (MALT) [de-identified] : CD 20, CD 10, CD 23, CD 19, CD 45 positive, Ki 67 < 3 %.  [de-identified] : Patient has aphasia, but states has been doing well.  Has no complaints. \par Per daughter she reports patient's A1c had done up.  MD's had lowered her metformin and since went up they increased it again.  \par She has upcoming visits scheduled with neurology.  \par She does not feel any different than last visit.  No new pains. \par Daughter does mention patient has been having increased night sweats, however she prefers to sleep without the fan on.   \par Denies any changes in her family, medical, or social history since her last visit of 3/29/22.  \par \par \par

## 2022-08-25 NOTE — ASSESSMENT
[FreeTextEntry1] : Patient is an 82 y.o. with hx of a stage II colon cancer s/p R hemicolectomy 2017- DIALLO. Now > 5 years post operative. Last colonoscopy 2021. \par \par MALT lymphoma diagnosed 2019 - found incidentally on surveillance scans for colon cancer.  Has pleural based/ paraspinal and bone lesions. Clinically has been asymptomatic.  \par Recently with ? increase in night sweats vs. sweating from summer heat.  \par Patient does not have other symptoms such as increased fatigue or weight loss. No increase in pains.  \par She does not use a fan at night (does not find it comfortable).  Will monitor. \par \par Patient also with suspicious thyroid nodules  - being monitored by H/N surgery.  \par \par Patient/family opting for surveillance when possible.  We are only going to order scans when clinically indicated, not routinely. \par D/W patient /daughter symptoms that would be concerning - including weight loss, increased fatigue, night sweats persisting into cooler weather, and development and persistence of new pains. \par \par RTO 4 months sooner PRN\par \par PCP MELBA DURHAM

## 2022-09-26 ENCOUNTER — OUTPATIENT (OUTPATIENT)
Dept: OUTPATIENT SERVICES | Facility: HOSPITAL | Age: 82
LOS: 1 days | End: 2022-09-26
Payer: MEDICARE

## 2022-09-26 ENCOUNTER — RESULT REVIEW (OUTPATIENT)
Age: 82
End: 2022-09-26

## 2022-09-26 ENCOUNTER — APPOINTMENT (OUTPATIENT)
Dept: MAMMOGRAPHY | Facility: CLINIC | Age: 82
End: 2022-09-26

## 2022-09-26 ENCOUNTER — APPOINTMENT (OUTPATIENT)
Dept: ULTRASOUND IMAGING | Facility: CLINIC | Age: 82
End: 2022-09-26

## 2022-09-26 DIAGNOSIS — R92.2 INCONCLUSIVE MAMMOGRAM: ICD-10-CM

## 2022-09-26 DIAGNOSIS — Z12.31 ENCOUNTER FOR SCREENING MAMMOGRAM FOR MALIGNANT NEOPLASM OF BREAST: ICD-10-CM

## 2022-09-26 PROCEDURE — 77067 SCR MAMMO BI INCL CAD: CPT | Mod: 26

## 2022-09-26 PROCEDURE — 77063 BREAST TOMOSYNTHESIS BI: CPT | Mod: 26

## 2022-09-26 PROCEDURE — 76641 ULTRASOUND BREAST COMPLETE: CPT | Mod: 26,50

## 2022-09-26 PROCEDURE — 76641 ULTRASOUND BREAST COMPLETE: CPT

## 2022-09-26 PROCEDURE — 77067 SCR MAMMO BI INCL CAD: CPT

## 2022-09-26 PROCEDURE — 77063 BREAST TOMOSYNTHESIS BI: CPT

## 2022-10-19 ENCOUNTER — APPOINTMENT (OUTPATIENT)
Dept: INTERNAL MEDICINE | Facility: CLINIC | Age: 82
End: 2022-10-19

## 2022-10-19 ENCOUNTER — NON-APPOINTMENT (OUTPATIENT)
Age: 82
End: 2022-10-19

## 2022-10-19 VITALS
BODY MASS INDEX: 30.05 KG/M2 | RESPIRATION RATE: 18 BRPM | OXYGEN SATURATION: 99 % | SYSTOLIC BLOOD PRESSURE: 140 MMHG | DIASTOLIC BLOOD PRESSURE: 90 MMHG | HEART RATE: 78 BPM | TEMPERATURE: 98.1 F | HEIGHT: 64 IN | WEIGHT: 176 LBS

## 2022-10-19 DIAGNOSIS — Z13.39 ENCOUNTER FOR SCREENING EXAM FOR OTHER MENTAL HEALTH AND BEHAVIORAL DISORDERS: ICD-10-CM

## 2022-10-19 DIAGNOSIS — Z13.820 ENCOUNTER FOR SCREENING FOR OSTEOPOROSIS: ICD-10-CM

## 2022-10-19 PROCEDURE — 36415 COLL VENOUS BLD VENIPUNCTURE: CPT

## 2022-10-19 PROCEDURE — G0444 DEPRESSION SCREEN ANNUAL: CPT

## 2022-10-19 PROCEDURE — G0439: CPT

## 2022-10-19 PROCEDURE — 93000 ELECTROCARDIOGRAM COMPLETE: CPT | Mod: 59

## 2022-10-19 PROCEDURE — G0442 ANNUAL ALCOHOL SCREEN 15 MIN: CPT | Mod: 59

## 2022-10-19 NOTE — REVIEW OF SYSTEMS
[Incontinence] : incontinence [Memory Loss] : memory loss [Negative] : Heme/Lymph [Frequency] : no frequency [Headache] : no headache [Dizziness] : no dizziness [Fainting] : no fainting [Confusion] : no confusion [Unsteady Walking] : no ataxia [FreeTextEntry4] : use of hearing desi both ears [FreeTextEntry8] : uses pull ups

## 2022-10-19 NOTE — HEALTH RISK ASSESSMENT
[With Family] : lives with family [Independent] : feeding [Some assistance needed] : managing medications [Full assistance needed] : managing finances [Smoke Detector] : smoke detector [Seat Belt] :  uses seat belt [Designated Healthcare Proxy] : Designated healthcare proxy [Name: ___] : Health Care Proxy's Name: [unfilled]  [Relationship: ___] : Relationship: [unfilled] [Fair] :  ~his/her~ mood as fair [Never] : Never [No] : In the past 12 months have you used drugs other than those required for medical reasons? No [No falls in past year] : Patient reported no falls in the past year [0] : 2) Feeling down, depressed, or hopeless: Not at all (0) [PHQ-2 Negative - No further assessment needed] : PHQ-2 Negative - No further assessment needed [Patient reported mammogram was normal] : Patient reported mammogram was normal [HIV test declined] : HIV test declined [Hepatitis C test declined] : Hepatitis C test declined [Language] : difficulty with language [Learning/Retaining New Information] : difficulty learning/retaining new information [Handling Complex Tasks] : difficulty handling complex tasks [Reasoning] : difficulty with reasoning [Spatial Ability and Orientation] : difficulty with spatial ability and orientation [de-identified] : moves around in the house [de-identified] : eats pretty healthy [[id number]] : 0 [Change in mental status noted] : No change in mental status noted [Behavior] : denies difficulty with behavior [Reports changes in dental health] : Reports no changes in dental health [MammogramDate] : 09/22 [BoneDensityComments] : ordered today [de-identified] : hearing aid both ears [de-identified] : hx of cataract surgery both eyes [AdvancecareDate] : 10/22

## 2022-10-19 NOTE — HISTORY OF PRESENT ILLNESS
[FreeTextEntry1] : Annual wellness exam.\par  [de-identified] : Ms. TAVARES DIAZ  is    82 year female . she is here with her daughter .\par Pt. have dementia , most of the taking is done by the daughter.\par DM-2 she is on metformin 850 mg bid, Januvia 100 mg qd.\par Hypothyroidism on levothyroxine 75 mcq.\par Dementia on memantine 10 mg.\par HTN on valsartan 160 mg qd.\par HLD on atorvastatin 10 mg.\par Pt. is over all feeling well.Though daughter tells me that her cognitive function is declining, she is having difficulty in speech , finding words, she needs assistance and reminders in her ADL's.\par No change is bowel and bladder habit.\par No trouble sleeping at night.\par Not on pain.\par

## 2022-10-19 NOTE — PHYSICAL EXAM
[___/1] : [unfilled]/1   [___/3] : [unfilled]/3 [No Edema] : there was no peripheral edema [Normal] : no rash [___/5] : [unfilled]/5 [___/4] : [unfilled]/4 [___/2] : [unfilled]/2 [___/8] : [unfilled]/8 [Dementia] : Dementia [Normal Affect] : the affect was normal [de-identified] : enlarged thyroid [TextBox_2] : yes [SlumsTotal] : 3

## 2022-10-19 NOTE — ASSESSMENT
[FreeTextEntry1] : Annual:\par Ordered comprehensive blood work , screen for hyperlipidemia.\par labs drawn in the office.\par will review the results and address if abnormal.\par she is up to date on mammo  : result  normal.\par Order DEXA scan\par  Received   flu vaccine at [facility]\par up to date in  tdap, PPSV  , shingles vaccine.\par  Received  COVID vaccine, got booster shot\par Declined for HIV and Hep C  screening test.\par alcohol screening :SIBRT: 0\par Depression screening:PHQ2: 0\par EKG: NSR , no ST-T wave changes\par \par Dementia:\par Patient has vascular dementia she is on memantine 10 mg twice daily.\par Decline in cognitive function, having difficulty in speech and writing.\par \par Hypertension:\par Blood pressure is in goal on valsartan 160 mg.\par She will continue with the current medication\par Advised to continue low-salt diet.\par \par Type 2 diabetes:\par Last A1c in office 8.1.\par She is on metformin to 850 mg was increased to twice a day continue with Januvia 100 mg once a day.\par Ordered A1c.\par \par HLD:\par on atorvastatin 10 mg\par \par Hypothyroidism:\par on levothyroxine 75 mcq.\par ordered TSH.\par \par vit b12 deficiency:\par ordered vit b12 level.\par \par Thyroid nodule:\par I reviewed the FNA report and discussed with daughter.\par sees endo , she was recommended for surgery, daughter is reluctant.\par \par f/u in 3 months.\par

## 2022-10-20 LAB
25(OH)D3 SERPL-MCNC: 46.1 NG/ML
ALBUMIN SERPL ELPH-MCNC: 4.5 G/DL
ALP BLD-CCNC: 69 U/L
ALT SERPL-CCNC: 15 U/L
ANION GAP SERPL CALC-SCNC: 12 MMOL/L
APPEARANCE: ABNORMAL
AST SERPL-CCNC: 15 U/L
BACTERIA: ABNORMAL
BASOPHILS # BLD AUTO: 0.03 K/UL
BASOPHILS NFR BLD AUTO: 0.5 %
BILIRUB SERPL-MCNC: 0.7 MG/DL
BILIRUBIN URINE: NEGATIVE
BLOOD URINE: NEGATIVE
BUN SERPL-MCNC: 15 MG/DL
CALCIUM SERPL-MCNC: 10.1 MG/DL
CHLORIDE SERPL-SCNC: 103 MMOL/L
CHOLEST SERPL-MCNC: 164 MG/DL
CO2 SERPL-SCNC: 26 MMOL/L
COLOR: YELLOW
CREAT SERPL-MCNC: 0.67 MG/DL
EGFR: 86 ML/MIN/1.73M2
EOSINOPHIL # BLD AUTO: 0.13 K/UL
EOSINOPHIL NFR BLD AUTO: 2 %
ESTIMATED AVERAGE GLUCOSE: 146 MG/DL
GLUCOSE QUALITATIVE U: NEGATIVE
GLUCOSE SERPL-MCNC: 148 MG/DL
HBA1C MFR BLD HPLC: 6.7 %
HCT VFR BLD CALC: 44.5 %
HDLC SERPL-MCNC: 54 MG/DL
HGB BLD-MCNC: 14.4 G/DL
HYALINE CASTS: 0 /LPF
IMM GRANULOCYTES NFR BLD AUTO: 0.2 %
KETONES URINE: NEGATIVE
LDLC SERPL CALC-MCNC: 87 MG/DL
LEUKOCYTE ESTERASE URINE: ABNORMAL
LYMPHOCYTES # BLD AUTO: 2.06 K/UL
LYMPHOCYTES NFR BLD AUTO: 32.2 %
MAN DIFF?: NORMAL
MCHC RBC-ENTMCNC: 29.7 PG
MCHC RBC-ENTMCNC: 32.4 GM/DL
MCV RBC AUTO: 91.8 FL
MICROSCOPIC-UA: NORMAL
MONOCYTES # BLD AUTO: 0.5 K/UL
MONOCYTES NFR BLD AUTO: 7.8 %
NEUTROPHILS # BLD AUTO: 3.67 K/UL
NEUTROPHILS NFR BLD AUTO: 57.3 %
NITRITE URINE: NEGATIVE
NONHDLC SERPL-MCNC: 110 MG/DL
PH URINE: 6
PLATELET # BLD AUTO: 224 K/UL
POTASSIUM SERPL-SCNC: 4.4 MMOL/L
PROT SERPL-MCNC: 7.1 G/DL
PROTEIN URINE: NORMAL
RBC # BLD: 4.85 M/UL
RBC # FLD: 13.5 %
RED BLOOD CELLS URINE: 2 /HPF
SODIUM SERPL-SCNC: 142 MMOL/L
SPECIFIC GRAVITY URINE: 1.02
SQUAMOUS EPITHELIAL CELLS: 16 /HPF
TRIGL SERPL-MCNC: 115 MG/DL
TSH SERPL-ACNC: 2.11 UIU/ML
UROBILINOGEN URINE: NORMAL
VIT B12 SERPL-MCNC: >2000 PG/ML
WBC # FLD AUTO: 6.4 K/UL
WHITE BLOOD CELLS URINE: 27 /HPF

## 2022-11-04 RX ORDER — AZELASTINE HYDROCHLORIDE 137 UG/1
0.1 SPRAY, METERED NASAL
Qty: 30 | Refills: 1 | Status: ACTIVE | COMMUNITY
Start: 2021-01-04 | End: 1900-01-01

## 2022-11-10 ENCOUNTER — APPOINTMENT (OUTPATIENT)
Dept: ULTRASOUND IMAGING | Facility: CLINIC | Age: 82
End: 2022-11-10

## 2022-11-10 ENCOUNTER — OUTPATIENT (OUTPATIENT)
Dept: OUTPATIENT SERVICES | Facility: HOSPITAL | Age: 82
LOS: 1 days | End: 2022-11-10
Payer: MEDICARE

## 2022-11-10 DIAGNOSIS — E04.2 NONTOXIC MULTINODULAR GOITER: ICD-10-CM

## 2022-11-10 PROCEDURE — 76536 US EXAM OF HEAD AND NECK: CPT

## 2022-11-10 PROCEDURE — 76536 US EXAM OF HEAD AND NECK: CPT | Mod: 26

## 2022-11-15 ENCOUNTER — TRANSCRIPTION ENCOUNTER (OUTPATIENT)
Age: 82
End: 2022-11-15

## 2022-11-17 ENCOUNTER — APPOINTMENT (OUTPATIENT)
Dept: SURGERY | Facility: CLINIC | Age: 82
End: 2022-11-17

## 2022-11-17 VITALS
WEIGHT: 176 LBS | OXYGEN SATURATION: 99 % | DIASTOLIC BLOOD PRESSURE: 88 MMHG | BODY MASS INDEX: 30.05 KG/M2 | HEIGHT: 64 IN | SYSTOLIC BLOOD PRESSURE: 140 MMHG | HEART RATE: 79 BPM

## 2022-11-17 PROCEDURE — 99212 OFFICE O/P EST SF 10 MIN: CPT

## 2022-11-17 NOTE — PHYSICAL EXAM
[Midline] : located in midline position [Normal] : no neck adenopathy [de-identified] : slightly confused at baseline [de-identified] : Extremities: GOLD x 4.   Skin: No obvious skin lesions.   Voice: clear

## 2022-12-14 ENCOUNTER — APPOINTMENT (OUTPATIENT)
Dept: INTERNAL MEDICINE | Facility: CLINIC | Age: 82
End: 2022-12-14

## 2022-12-14 VITALS
RESPIRATION RATE: 18 BRPM | OXYGEN SATURATION: 98 % | TEMPERATURE: 97.8 F | HEIGHT: 64 IN | WEIGHT: 177 LBS | SYSTOLIC BLOOD PRESSURE: 160 MMHG | HEART RATE: 87 BPM | DIASTOLIC BLOOD PRESSURE: 90 MMHG | BODY MASS INDEX: 30.22 KG/M2

## 2022-12-14 PROCEDURE — 81003 URINALYSIS AUTO W/O SCOPE: CPT | Mod: QW

## 2022-12-14 PROCEDURE — 99214 OFFICE O/P EST MOD 30 MIN: CPT | Mod: 25

## 2022-12-15 VITALS — SYSTOLIC BLOOD PRESSURE: 130 MMHG | DIASTOLIC BLOOD PRESSURE: 80 MMHG

## 2022-12-15 LAB
BILIRUB UR QL STRIP: NEGATIVE
CLARITY UR: NORMAL
COLLECTION METHOD: NORMAL
GLUCOSE UR-MCNC: NEGATIVE
HCG UR QL: 1 EU/DL
HGB UR QL STRIP.AUTO: NORMAL
KETONES UR-MCNC: NEGATIVE
LEUKOCYTE ESTERASE UR QL STRIP: NORMAL
NITRITE UR QL STRIP: NEGATIVE
PH UR STRIP: 5.5
PROT UR STRIP-MCNC: NORMAL
SP GR UR STRIP: 1.03

## 2022-12-15 NOTE — HISTORY OF PRESENT ILLNESS
[FreeTextEntry8] : Ms. DIAZ is here today she is here with her daughter who gives history.\par Her daughter stated she was complaining of back pain since yesterday, her urine was cloudy she uses a pull-up.\par Daughter also stated that she have seen head and neck surgery for multiple thyroid nodules was found to have left cervical lymph node she had a fine-needle  aspiration biopsy results pending.\par Overall she is doing well no change in mental status.\par \par \par

## 2022-12-15 NOTE — ASSESSMENT
[FreeTextEntry1] : Acute UTI:\par URINE DIP : + VE for blood and leukocytes.\par prescribed cephalexin 500 mg bid for 5 days .\par drink more water.\par complete the antibiotic coarse.\par maintain good hygiene.\par cranberry juice recommended as a prophylaxis.\par urine cx send .\par \par Hypertension:\par bp was elevated on triage .\par manually rechecked after 15 min , came down to 130/80.\par Blood pressure is fairly controlled on valsartan 160 mg.\par Advised to continue low-salt diet.\par \par Type 2 diabetes:\par A1c in office 8.1.\par e metformin to 850 mg was increased to twice a day continue with Januvia 100 mg once a day.\par reassurance given to the daughter, monitor blood sugar once a day.\par avoid fast foods and sweets.\par \par \par Dementia:\par Patient has vascular dementia she is on memantine 10 mg twice daily.\par No behavioral changes.\par

## 2022-12-15 NOTE — PHYSICAL EXAM
[Normal] : normal rate, regular rhythm, normal S1 and S2 and no murmur heard [de-identified] : erythemtous sking in the left arm at the injection sight , no induration , mild tenderness to touch .

## 2022-12-19 ENCOUNTER — RESULT REVIEW (OUTPATIENT)
Age: 82
End: 2022-12-19

## 2022-12-19 ENCOUNTER — OUTPATIENT (OUTPATIENT)
Dept: OUTPATIENT SERVICES | Facility: HOSPITAL | Age: 82
LOS: 1 days | End: 2022-12-19
Payer: MEDICARE

## 2022-12-19 ENCOUNTER — APPOINTMENT (OUTPATIENT)
Dept: ULTRASOUND IMAGING | Facility: CLINIC | Age: 82
End: 2022-12-19
Payer: MEDICARE

## 2022-12-19 DIAGNOSIS — R59.0 LOCALIZED ENLARGED LYMPH NODES: ICD-10-CM

## 2022-12-19 LAB — BACTERIA UR CULT: ABNORMAL

## 2022-12-19 PROCEDURE — 88305 TISSUE EXAM BY PATHOLOGIST: CPT | Mod: 26

## 2022-12-19 PROCEDURE — 10005 FNA BX W/US GDN 1ST LES: CPT

## 2022-12-19 PROCEDURE — 88173 CYTOPATH EVAL FNA REPORT: CPT

## 2022-12-19 PROCEDURE — 88173 CYTOPATH EVAL FNA REPORT: CPT | Mod: 26

## 2022-12-19 PROCEDURE — 88189 FLOWCYTOMETRY/READ 16 & >: CPT

## 2022-12-19 PROCEDURE — 87205 SMEAR GRAM STAIN: CPT

## 2022-12-19 PROCEDURE — 88305 TISSUE EXAM BY PATHOLOGIST: CPT

## 2022-12-19 PROCEDURE — 88185 FLOWCYTOMETRY/TC ADD-ON: CPT

## 2022-12-29 ENCOUNTER — RX RENEWAL (OUTPATIENT)
Age: 82
End: 2022-12-29

## 2023-01-03 ENCOUNTER — APPOINTMENT (OUTPATIENT)
Dept: NEUROLOGY | Facility: CLINIC | Age: 83
End: 2023-01-03
Payer: MEDICARE

## 2023-01-03 ENCOUNTER — NON-APPOINTMENT (OUTPATIENT)
Age: 83
End: 2023-01-03

## 2023-01-03 VITALS
SYSTOLIC BLOOD PRESSURE: 152 MMHG | WEIGHT: 177 LBS | BODY MASS INDEX: 30.22 KG/M2 | HEART RATE: 76 BPM | DIASTOLIC BLOOD PRESSURE: 86 MMHG | TEMPERATURE: 97.8 F | HEIGHT: 64 IN

## 2023-01-03 PROCEDURE — 99203 OFFICE O/P NEW LOW 30 MIN: CPT

## 2023-01-03 NOTE — DISCUSSION/SUMMARY
[FreeTextEntry1] : 82-year-old woman with a history of dementia, likely Alzheimer's, aphasia subtype. severe stage.\par Reviewed and discussed with the daughter and patient, available treatment options.\par For now we will continue Namenda 10 mg twice a day.  Seems well-tolerated.\par Encourage regular physical activities, perhaps joining a day program for adults with dementia.\par Daughter requested referral to speech therapist, as in the past was of some help for her.  Unclear whether she will gain much benefit at this time.\par Return for reevaluation at the end of the year.

## 2023-01-03 NOTE — PHYSICAL EXAM
[Comprehension] : comprehension intact [Total Score ___ / 30] : the patient achieved a score of [unfilled] /30 [Date / Time ___ / 5] : date / time [unfilled] / 5 [Place ___ / 5] : place [unfilled] / 5 [Registration ___ / 3] : registration [unfilled] / 3 [Serial Sevens ___/5] : serial sevens [unfilled] / 5 [Naming 2 Objects ___ / 2] : naming two objects [unfilled] / 2 [Repeating a Sentence ___ / 1] : repeating a sentence [unfilled] / 1 [Writing a Sentence ___ / 1] : write sentence [unfilled] / 1 [3-stage Verbal Command ___ / 3] : three-stage verbal command [unfilled] / 3 [Written Command ___ / 1] : written command [unfilled] / 1 [Copy a Design ___ / 1] : copy a design [unfilled] / 1 [Recall ___ / 3] : recall [unfilled] / 3 [Cranial Nerves Optic (II)] : visual acuity intact bilaterally,  visual fields full to confrontation, pupils equal round and reactive to light [Cranial Nerves Oculomotor (III)] : extraocular motion intact [Cranial Nerves Trigeminal (V)] : facial sensation intact symmetrically [Cranial Nerves Facial (VII)] : face symmetrical [Cranial Nerves Vestibulocochlear (VIII)] : hearing was intact bilaterally [Cranial Nerves Accessory (XI - Cranial And Spinal)] : head turning and shoulder shrug symmetric [Cranial Nerves Hypoglossal (XII)] : there was no tongue deviation with protrusion [Motor Strength] : muscle strength was normal in all four extremities [Motor Handedness Right-Handed] : the patient is right hand dominant [Sensation Tactile Decrease] : light touch was intact [Abnormal Walk] : normal gait [] : no respiratory distress [Respiration, Rhythm And Depth] : normal respiratory rhythm and effort [Heart Rate And Rhythm] : heart rate was normal and rhythm regular [Heart Sounds] : normal S1 and S2 [Murmurs] : no murmurs [Arterial Pulses Carotid] : carotid pulses were normal with no bruits [Full Pulse] : the pedal pulses are present [Nonpitting Edema] : nonpitting edema present [___ +] : bilateral [unfilled]+ pitting edema to the ankles [Place] : disoriented to place [Time] : disoriented to time [Short Term Intact] : short term memory impaired [Span Intact] : the attention span was decreased [Naming Objects] : difficulty naming common objects [Repeating Phrases] : difficulty repeating a phrase [Writing A Sentence] : difficulty writing a sentence [Fluency] : fluency not intact [Reading] : difficulty reading [Paresis Pronator Drift Right-Sided] : no pronator drift on the right [Paresis Pronator Drift Left-Sided] : no pronator drift on the left [Limited Balance] : balance was intact [Dysdiadochokinesia Bilaterally] : not present

## 2023-01-03 NOTE — REVIEW OF SYSTEMS
[As Noted in HPI] : as noted in HPI [Confused or Disoriented] : confusion [Memory Lapses or Loss] : memory loss [Decr. Concentrating Ability] : decreased concentrating ability [Difficulty with Language] : ~M difficulty with language [Changed Thought Patterns] : changed thought patterns [Repeating Questions] : repeated questioning about recent events [Facial Weakness] : no facial weakness [Arm Weakness] : no arm weakness [Hand Weakness] : no hand weakness [Leg Weakness] : no leg weakness [Difficulty Writing] : difficulty writing [Difficulties in Speech] : speech difficulties [Numbness] : no numbness [Migraine Headache] : no migraine headache [Frequent Falls] : not falling [Negative] : Heme/Lymph

## 2023-01-03 NOTE — HISTORY OF PRESENT ILLNESS
[FreeTextEntry1] : 82-year-old woman, right-handed accompanied by her daughter, diagnosed with dementia approximately 7 years ago.  Disorder started predominantly 1 of language, with progressed into memory.  Patient was living with her daughter, her family  and son since 2012 after the death of her .\par As per the daughter, as the patient unable to give a full history, the main progressive decline in her ability to do her daily activities.  Initially she was independent able to take care of her own needs, medications.  Now for the last year and 1/2 to 2 years, patient moved from [facility] to [place], the daughter has taking most of her day-to-day activities away.\par She requires assistance and reminded to take her medications, organize her medications, is able to assist with dressing but unable to do so on her own.  Is incontinent, daughter reports now has forgotten her use toilet paper.\par Able to eat, but does not do any cooking or cleaning.No aggressive behavior, no hallucinations, no sleep disorders.\par Patient was initially treated with donepezil but was unable to tolerate it due to gastrointestinal side effects.  Has been on Namenda now for least 5 years, 10 mg twice a day.  Well-tolerated.

## 2023-01-06 ENCOUNTER — OUTPATIENT (OUTPATIENT)
Dept: OUTPATIENT SERVICES | Facility: HOSPITAL | Age: 83
LOS: 1 days | Discharge: HOME | End: 2023-01-06

## 2023-01-06 DIAGNOSIS — C18.9 MALIGNANT NEOPLASM OF COLON, UNSPECIFIED: ICD-10-CM

## 2023-01-10 ENCOUNTER — RESULT REVIEW (OUTPATIENT)
Age: 83
End: 2023-01-10

## 2023-01-10 ENCOUNTER — APPOINTMENT (OUTPATIENT)
Dept: HEMATOLOGY ONCOLOGY | Facility: CLINIC | Age: 83
End: 2023-01-10
Payer: MEDICARE

## 2023-01-10 VITALS
HEART RATE: 76 BPM | TEMPERATURE: 97.8 F | DIASTOLIC BLOOD PRESSURE: 82 MMHG | SYSTOLIC BLOOD PRESSURE: 156 MMHG | WEIGHT: 168.19 LBS | HEIGHT: 63.78 IN | BODY MASS INDEX: 29.07 KG/M2 | OXYGEN SATURATION: 95 % | RESPIRATION RATE: 18 BRPM

## 2023-01-10 DIAGNOSIS — C88.4 EXTRANODAL MARGINAL ZONE B-CELL LYMPHOMA OF MUCOSA-ASSOCIATED LYMPHOID TISSUE [MALT-LYMPHOMA]: ICD-10-CM

## 2023-01-10 LAB
BASOPHILS # BLD AUTO: 0.02 K/UL — SIGNIFICANT CHANGE UP (ref 0–0.2)
BASOPHILS NFR BLD AUTO: 0.3 % — SIGNIFICANT CHANGE UP (ref 0–2)
EOSINOPHIL # BLD AUTO: 0.06 K/UL — SIGNIFICANT CHANGE UP (ref 0–0.5)
EOSINOPHIL NFR BLD AUTO: 1 % — SIGNIFICANT CHANGE UP (ref 0–6)
HCT VFR BLD CALC: 44.1 % — SIGNIFICANT CHANGE UP (ref 34.5–45)
HGB BLD-MCNC: 14.7 G/DL — SIGNIFICANT CHANGE UP (ref 11.5–15.5)
IMM GRANULOCYTES NFR BLD AUTO: 0.2 % — SIGNIFICANT CHANGE UP (ref 0–0.9)
LYMPHOCYTES # BLD AUTO: 2.25 K/UL — SIGNIFICANT CHANGE UP (ref 1–3.3)
LYMPHOCYTES # BLD AUTO: 37.7 % — SIGNIFICANT CHANGE UP (ref 13–44)
MCHC RBC-ENTMCNC: 30.2 PG — SIGNIFICANT CHANGE UP (ref 27–34)
MCHC RBC-ENTMCNC: 33.3 GM/DL — SIGNIFICANT CHANGE UP (ref 32–36)
MCV RBC AUTO: 90.6 FL — SIGNIFICANT CHANGE UP (ref 80–100)
MONOCYTES # BLD AUTO: 0.4 K/UL — SIGNIFICANT CHANGE UP (ref 0–0.9)
MONOCYTES NFR BLD AUTO: 6.7 % — SIGNIFICANT CHANGE UP (ref 2–14)
NEUTROPHILS # BLD AUTO: 3.23 K/UL — SIGNIFICANT CHANGE UP (ref 1.8–7.4)
NEUTROPHILS NFR BLD AUTO: 54.1 % — SIGNIFICANT CHANGE UP (ref 43–77)
NRBC # BLD: 0 /100 WBCS — SIGNIFICANT CHANGE UP (ref 0–0)
PLATELET # BLD AUTO: 208 K/UL — SIGNIFICANT CHANGE UP (ref 150–400)
RBC # BLD: 4.87 M/UL — SIGNIFICANT CHANGE UP (ref 3.8–5.2)
RBC # FLD: 13.2 % — SIGNIFICANT CHANGE UP (ref 10.3–14.5)
WBC # BLD: 5.97 K/UL — SIGNIFICANT CHANGE UP (ref 3.8–10.5)
WBC # FLD AUTO: 5.97 K/UL — SIGNIFICANT CHANGE UP (ref 3.8–10.5)

## 2023-01-10 PROCEDURE — 99214 OFFICE O/P EST MOD 30 MIN: CPT

## 2023-01-10 NOTE — REVIEW OF SYSTEMS
[Patient Intake Form Reviewed] : Patient intake form was reviewed [Negative] : Allergic/Immunologic [FreeTextEntry2] : per HPI  [de-identified] : memory worse

## 2023-01-10 NOTE — ASSESSMENT
[FreeTextEntry1] : 83 y/o female with hx of stage II colon cancer s/p R hemicolectomy 2017- DIALLO . \par Routine scans not indicated. 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 Will continue to monitor clinically. Recent biopsy of small cervical nodes- is c/w her known hx of low grade NHL. Not necessary to initiate treatment. \par \par Avid thyroid nodules. 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  Follows with ENT/ endocrinology.\par \par Discussed with patient  and her daughter. \par return visit in 4 months sooner PRN

## 2023-01-10 NOTE — PHYSICAL EXAM
[Fully active, able to carry on all pre-disease performance without restriction] : Status 0 - Fully active, able to carry on all pre-disease performance without restriction [Normal] : affect appropriate [de-identified] : looks OK  [de-identified] : cannot  palpate thyroid nodules   [de-identified] : trace ankle edema  [de-identified] : soft bilat small axillary nodes

## 2023-01-10 NOTE — HISTORY OF PRESENT ILLNESS
[de-identified] : 81 F seen previously at [facility], now moved to .\par \par Has hx of colon cancer 2017- 1/15/17 right hemicolectomy pT3, N0  ( 0/16)  mod diff adeno of sigmoid flexure MSI intact \par Follow up scans for surveillance after colon cancer in July 2019 showed pleural plaque left paraspinal at low thoracic  level,  avid on PET. [facility] pleural biopsy  8/14/19  : extranodal marginal zone lymphoma  ( MALT)  ( CD 20, CD 10, CD 23, CD19, CD 45 positive ki67 < 3 %  . No systemic tx  was necessary- active surveillance \par  \par 2021- found to have multiple thyroid nodules avid on PET scan. 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  Per molecular panel- risk of  malignancy 50 % . Biopsy of few additional smaller nodules- benign.\par \par Thyroidectomy was discussed ( endocrinology/ head and neck surgery) . Patient opted for monitoring.\par \par  [de-identified] : Recent needle biopsy of left cervical LN by ENT- c/w  lymphoproliferative disorder.\par Patient  here with daughter. \par No major changes. Memory/ dementia slightly worse. Less interest in things, less active. Few lbs weight loss- but no GI issues, snacks less. No infections. No fevers. Questionable night sweats- not drenching and  sporadic only.

## 2023-01-11 LAB
ALBUMIN SERPL ELPH-MCNC: 4.5 G/DL — SIGNIFICANT CHANGE UP (ref 3.3–5)
ALP SERPL-CCNC: 67 U/L — SIGNIFICANT CHANGE UP (ref 40–120)
ALT FLD-CCNC: 15 U/L — SIGNIFICANT CHANGE UP (ref 10–45)
ANION GAP SERPL CALC-SCNC: 14 MMOL/L — SIGNIFICANT CHANGE UP (ref 5–17)
AST SERPL-CCNC: 19 U/L — SIGNIFICANT CHANGE UP (ref 10–40)
BILIRUB SERPL-MCNC: 0.5 MG/DL — SIGNIFICANT CHANGE UP (ref 0.2–1.2)
BUN SERPL-MCNC: 18 MG/DL — SIGNIFICANT CHANGE UP (ref 7–23)
CALCIUM SERPL-MCNC: 10.2 MG/DL — SIGNIFICANT CHANGE UP (ref 8.4–10.5)
CHLORIDE SERPL-SCNC: 102 MMOL/L — SIGNIFICANT CHANGE UP (ref 96–108)
CO2 SERPL-SCNC: 26 MMOL/L — SIGNIFICANT CHANGE UP (ref 22–31)
CREAT SERPL-MCNC: 0.7 MG/DL — SIGNIFICANT CHANGE UP (ref 0.5–1.3)
EGFR: 85 ML/MIN/1.73M2 — SIGNIFICANT CHANGE UP
GLUCOSE SERPL-MCNC: 130 MG/DL — HIGH (ref 70–99)
POTASSIUM SERPL-MCNC: 4.6 MMOL/L — SIGNIFICANT CHANGE UP (ref 3.5–5.3)
POTASSIUM SERPL-SCNC: 4.6 MMOL/L — SIGNIFICANT CHANGE UP (ref 3.5–5.3)
PROT SERPL-MCNC: 7.1 G/DL — SIGNIFICANT CHANGE UP (ref 6–8.3)
SODIUM SERPL-SCNC: 141 MMOL/L — SIGNIFICANT CHANGE UP (ref 135–145)

## 2023-01-12 ENCOUNTER — APPOINTMENT (OUTPATIENT)
Dept: INTERNAL MEDICINE | Facility: CLINIC | Age: 83
End: 2023-01-12
Payer: MEDICARE

## 2023-01-12 VITALS
SYSTOLIC BLOOD PRESSURE: 140 MMHG | HEIGHT: 63 IN | RESPIRATION RATE: 18 BRPM | TEMPERATURE: 98.1 F | WEIGHT: 168 LBS | BODY MASS INDEX: 29.77 KG/M2 | HEART RATE: 70 BPM | OXYGEN SATURATION: 95 % | DIASTOLIC BLOOD PRESSURE: 78 MMHG

## 2023-01-12 DIAGNOSIS — J31.0 CHRONIC RHINITIS: ICD-10-CM

## 2023-01-12 LAB — HBA1C MFR BLD HPLC: 5.7

## 2023-01-12 PROCEDURE — 99214 OFFICE O/P EST MOD 30 MIN: CPT | Mod: 25

## 2023-01-12 PROCEDURE — 83036 HEMOGLOBIN GLYCOSYLATED A1C: CPT | Mod: QW

## 2023-01-12 RX ORDER — FLUTICASONE PROPIONATE 50 UG/1
50 SPRAY, METERED NASAL TWICE DAILY
Qty: 1 | Refills: 2 | Status: ACTIVE | COMMUNITY
Start: 2022-01-04 | End: 1900-01-01

## 2023-01-12 NOTE — HISTORY OF PRESENT ILLNESS
[FreeTextEntry1] : Here for f/u  type 2 dm, HTN. [de-identified] : Ms. DIAZ is here for a f/u.\par She is here today with her daughter you gives the history . pt. have dementia.\par type 2 DM , she is on metformin 850 qd ,Januvia 100 mg \par She is overall doing well.\par Daughter stated her appetite is decreasing , she is not snacking any more.\par

## 2023-01-12 NOTE — REVIEW OF SYSTEMS
[Headache] : no headache [Dizziness] : no dizziness [Fainting] : no fainting [Confusion] : no confusion

## 2023-01-12 NOTE — ASSESSMENT
[FreeTextEntry1] : Dementia:\par Patient has vascular dementia she is on memantine 10 mg twice daily.\par Her cognitive function is declining.\par Her appetite is decreasing , she maintains her weight.\par \par \par Hypertension:\par Blood pressure is on goal today on valsartan 160 mg.\par Advised to continue low-salt diet.\par \par Type 2 diabetes:\par poct is 5.7 today came down from 6.7 .\par decrease Januvia to 25 mg , continue metformin 850 mg bid.\par f/u in 6 monthh.\par continue low carb diet.\par \par MALT Lymphoma:\par under the care of oncology .\par it is stable.\par \par Hypothyroidism:\par currently euthyroid on levothyroxine 75 mcq.\par \par Non allergic rhinitis:\par on azelastine nasal spray .\par advised to stop for now and use Flonase , as she continues have runny nose.\par also advised her to give a breaj to all nasal spray and just use the saline nasal spray.\par \par

## 2023-01-26 ENCOUNTER — APPOINTMENT (OUTPATIENT)
Dept: SURGERY | Facility: CLINIC | Age: 83
End: 2023-01-26
Payer: MEDICARE

## 2023-01-26 PROCEDURE — 99214 OFFICE O/P EST MOD 30 MIN: CPT

## 2023-01-26 NOTE — PHYSICAL EXAM
[Midline] : located in midline position [Normal] : no neck adenopathy [de-identified] : appears more confused with trouble articulating [de-identified] : Extremities: GOLD x 4.   Skin: No obvious skin lesions.   Voice: clear

## 2023-02-02 ENCOUNTER — RX RENEWAL (OUTPATIENT)
Age: 83
End: 2023-02-02

## 2023-04-10 ENCOUNTER — RX RENEWAL (OUTPATIENT)
Age: 83
End: 2023-04-10

## 2023-05-01 ENCOUNTER — APPOINTMENT (OUTPATIENT)
Dept: ULTRASOUND IMAGING | Facility: CLINIC | Age: 83
End: 2023-05-01
Payer: MEDICARE

## 2023-05-01 ENCOUNTER — OUTPATIENT (OUTPATIENT)
Dept: OUTPATIENT SERVICES | Facility: HOSPITAL | Age: 83
LOS: 1 days | End: 2023-05-01
Payer: MEDICARE

## 2023-05-01 DIAGNOSIS — E04.2 NONTOXIC MULTINODULAR GOITER: ICD-10-CM

## 2023-05-01 DIAGNOSIS — C88.4 EXTRANODAL MARGINAL ZONE B-CELL LYMPHOMA OF MUCOSA-ASSOCIATED LYMPHOID TISSUE [MALT-LYMPHOMA]: ICD-10-CM

## 2023-05-01 PROCEDURE — 76536 US EXAM OF HEAD AND NECK: CPT | Mod: 26

## 2023-05-01 PROCEDURE — 76536 US EXAM OF HEAD AND NECK: CPT

## 2023-05-04 ENCOUNTER — APPOINTMENT (OUTPATIENT)
Dept: SURGERY | Facility: CLINIC | Age: 83
End: 2023-05-04
Payer: MEDICARE

## 2023-05-04 DIAGNOSIS — R59.0 LOCALIZED ENLARGED LYMPH NODES: ICD-10-CM

## 2023-05-04 PROCEDURE — 99213 OFFICE O/P EST LOW 20 MIN: CPT

## 2023-05-04 NOTE — PHYSICAL EXAM
[Midline] : located in midline position [Normal] : no neck adenopathy [de-identified] : appears more confused with trouble articulating [de-identified] : Extremities: GOLD x 4.   Skin: No obvious skin lesions.   Voice: clear

## 2023-05-23 ENCOUNTER — OUTPATIENT (OUTPATIENT)
Dept: OUTPATIENT SERVICES | Facility: HOSPITAL | Age: 83
LOS: 1 days | Discharge: HOME | End: 2023-05-23

## 2023-05-23 DIAGNOSIS — C18.9 MALIGNANT NEOPLASM OF COLON, UNSPECIFIED: ICD-10-CM

## 2023-06-06 ENCOUNTER — RESULT REVIEW (OUTPATIENT)
Age: 83
End: 2023-06-06

## 2023-06-06 ENCOUNTER — APPOINTMENT (OUTPATIENT)
Dept: HEMATOLOGY ONCOLOGY | Facility: CLINIC | Age: 83
End: 2023-06-06
Payer: MEDICARE

## 2023-06-06 VITALS — BODY MASS INDEX: 27.81 KG/M2 | WEIGHT: 157 LBS

## 2023-06-06 DIAGNOSIS — Z85.038 PERSONAL HISTORY OF OTHER MALIGNANT NEOPLASM OF LARGE INTESTINE: ICD-10-CM

## 2023-06-06 DIAGNOSIS — C88.4 EXTRANODAL MARGINAL ZONE B-CELL LYMPHOMA OF MUCOSA-ASSOCIATED LYMPHOID TISSUE [MALT-LYMPHOMA]: ICD-10-CM

## 2023-06-06 DIAGNOSIS — F03.90 UNSPECIFIED DEMENTIA WITHOUT BEHAVIORAL DISTURBANCE: ICD-10-CM

## 2023-06-06 LAB
BASOPHILS # BLD AUTO: 0.03 K/UL — SIGNIFICANT CHANGE UP (ref 0–0.2)
BASOPHILS NFR BLD AUTO: 0.5 % — SIGNIFICANT CHANGE UP (ref 0–2)
EOSINOPHIL # BLD AUTO: 0.08 K/UL — SIGNIFICANT CHANGE UP (ref 0–0.5)
EOSINOPHIL NFR BLD AUTO: 1.2 % — SIGNIFICANT CHANGE UP (ref 0–6)
HCT VFR BLD CALC: 41.1 % — SIGNIFICANT CHANGE UP (ref 34.5–45)
HGB BLD-MCNC: 13.7 G/DL — SIGNIFICANT CHANGE UP (ref 11.5–15.5)
IMM GRANULOCYTES NFR BLD AUTO: 0.3 % — SIGNIFICANT CHANGE UP (ref 0–0.9)
LYMPHOCYTES # BLD AUTO: 2.42 K/UL — SIGNIFICANT CHANGE UP (ref 1–3.3)
LYMPHOCYTES # BLD AUTO: 36.6 % — SIGNIFICANT CHANGE UP (ref 13–44)
MCHC RBC-ENTMCNC: 30.3 PG — SIGNIFICANT CHANGE UP (ref 27–34)
MCHC RBC-ENTMCNC: 33.3 GM/DL — SIGNIFICANT CHANGE UP (ref 32–36)
MCV RBC AUTO: 90.9 FL — SIGNIFICANT CHANGE UP (ref 80–100)
MONOCYTES # BLD AUTO: 0.45 K/UL — SIGNIFICANT CHANGE UP (ref 0–0.9)
MONOCYTES NFR BLD AUTO: 6.8 % — SIGNIFICANT CHANGE UP (ref 2–14)
NEUTROPHILS # BLD AUTO: 3.62 K/UL — SIGNIFICANT CHANGE UP (ref 1.8–7.4)
NEUTROPHILS NFR BLD AUTO: 54.6 % — SIGNIFICANT CHANGE UP (ref 43–77)
NRBC # BLD: 0 /100 WBCS — SIGNIFICANT CHANGE UP (ref 0–0)
PLATELET # BLD AUTO: 218 K/UL — SIGNIFICANT CHANGE UP (ref 150–400)
RBC # BLD: 4.52 M/UL — SIGNIFICANT CHANGE UP (ref 3.8–5.2)
RBC # FLD: 13.1 % — SIGNIFICANT CHANGE UP (ref 10.3–14.5)
WBC # BLD: 6.62 K/UL — SIGNIFICANT CHANGE UP (ref 3.8–10.5)
WBC # FLD AUTO: 6.62 K/UL — SIGNIFICANT CHANGE UP (ref 3.8–10.5)

## 2023-06-06 PROCEDURE — 99214 OFFICE O/P EST MOD 30 MIN: CPT

## 2023-06-06 NOTE — REVIEW OF SYSTEMS
[Patient Intake Form Reviewed] : Patient intake form was reviewed [Negative] : Heme/Lymph [FreeTextEntry2] : per son in law  Patient not able to answer [FreeTextEntry7] : per HPI  [FreeTextEntry9] : occasional joint pains no major changes  [de-identified] : memory worse;  has difficulty with words, sentences

## 2023-06-06 NOTE — PHYSICAL EXAM
[Fully active, able to carry on all pre-disease performance without restriction] : Status 0 - Fully active, able to carry on all pre-disease performance without restriction [Normal] : affect appropriate [de-identified] : pleasant elderly lady  [de-identified] : cannot  palpate thyroid nodules   [de-identified] : trace ankle edema  [de-identified] : soft bilat small axillary nodes [de-identified] : no overt focal deficits but has difficulty expressing her thoughts, words, unable to complete full sentence  [de-identified] : very poor memory

## 2023-06-06 NOTE — HISTORY OF PRESENT ILLNESS
[de-identified] : 81 F seen previously at [facility], now moved to .\par \par Has hx of colon cancer 2017- 1/15/17 right hemicolectomy pT3, N0  ( 0/16)  mod diff adeno of sigmoid flexure MSI intact \par Follow up scans for surveillance after colon cancer in July 2019 showed pleural plaque left paraspinal at low thoracic  level,  avid on PET. [facility] pleural biopsy  8/14/19  : extranodal marginal zone lymphoma  ( MALT)  ( CD 20, CD 10, CD 23, CD19, CD 45 positive ki67 < 3 %  . No systemic tx  was necessary- active surveillance \par  \par 2021- found to have multiple thyroid nodules avid on PET scan. 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  Per molecular panel- risk of  malignancy 50 % . Biopsy of few additional smaller nodules- benign.\par \par Thyroidectomy was discussed ( endocrinology/ head and neck surgery) . Patient opted for monitoring.\par \par  [de-identified] : Patient  here with son-in-law.  \par No major changes. Memory/ dementia significantly worse. Less interest in everything, less interest in food. No GI related complaints but she is eating less and lost some weight. No infections. No fevers.

## 2023-06-06 NOTE — ASSESSMENT
[FreeTextEntry1] : 81 y/o female with hx of stage II colon cancer s/p R hemicolectomy 2017- DIALLO . \par Routine scans not indicated. 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 Would hold off with PET scan at this point.\par Discussed with her  son in law and he agrees/ will relay that to patient's daughter.  \par \par Avid thyroid nodules. 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  \par \par \par return visit in 4 months sooner PRN

## 2023-06-07 LAB
ALBUMIN SERPL ELPH-MCNC: 4.4 G/DL — SIGNIFICANT CHANGE UP (ref 3.3–5)
ALP SERPL-CCNC: 67 U/L — SIGNIFICANT CHANGE UP (ref 40–120)
ALT FLD-CCNC: 12 U/L — SIGNIFICANT CHANGE UP (ref 10–45)
ANION GAP SERPL CALC-SCNC: 13 MMOL/L — SIGNIFICANT CHANGE UP (ref 5–17)
AST SERPL-CCNC: 16 U/L — SIGNIFICANT CHANGE UP (ref 10–40)
BILIRUB SERPL-MCNC: 0.5 MG/DL — SIGNIFICANT CHANGE UP (ref 0.2–1.2)
BUN SERPL-MCNC: 22 MG/DL — SIGNIFICANT CHANGE UP (ref 7–23)
CALCIUM SERPL-MCNC: 10.3 MG/DL — SIGNIFICANT CHANGE UP (ref 8.4–10.5)
CHLORIDE SERPL-SCNC: 103 MMOL/L — SIGNIFICANT CHANGE UP (ref 96–108)
CO2 SERPL-SCNC: 26 MMOL/L — SIGNIFICANT CHANGE UP (ref 22–31)
CREAT SERPL-MCNC: 0.69 MG/DL — SIGNIFICANT CHANGE UP (ref 0.5–1.3)
EGFR: 86 ML/MIN/1.73M2 — SIGNIFICANT CHANGE UP
GLUCOSE SERPL-MCNC: 130 MG/DL — HIGH (ref 70–99)
LDH SERPL L TO P-CCNC: 151 U/L — SIGNIFICANT CHANGE UP (ref 50–242)
POTASSIUM SERPL-MCNC: 4.5 MMOL/L — SIGNIFICANT CHANGE UP (ref 3.5–5.3)
POTASSIUM SERPL-SCNC: 4.5 MMOL/L — SIGNIFICANT CHANGE UP (ref 3.5–5.3)
PROT SERPL-MCNC: 6.7 G/DL — SIGNIFICANT CHANGE UP (ref 6–8.3)
SODIUM SERPL-SCNC: 142 MMOL/L — SIGNIFICANT CHANGE UP (ref 135–145)

## 2023-06-13 ENCOUNTER — APPOINTMENT (OUTPATIENT)
Dept: INTERNAL MEDICINE | Facility: CLINIC | Age: 83
End: 2023-06-13
Payer: MEDICARE

## 2023-06-13 VITALS
WEIGHT: 157 LBS | HEIGHT: 63 IN | BODY MASS INDEX: 27.82 KG/M2 | RESPIRATION RATE: 18 BRPM | OXYGEN SATURATION: 98 % | DIASTOLIC BLOOD PRESSURE: 70 MMHG | SYSTOLIC BLOOD PRESSURE: 125 MMHG | TEMPERATURE: 97.8 F | HEART RATE: 71 BPM

## 2023-06-13 DIAGNOSIS — L22 DIAPER DERMATITIS: ICD-10-CM

## 2023-06-13 DIAGNOSIS — M19.049 PRIMARY OSTEOARTHRITIS, UNSPECIFIED HAND: ICD-10-CM

## 2023-06-13 LAB — HBA1C MFR BLD HPLC: 5.4

## 2023-06-13 PROCEDURE — 83036 HEMOGLOBIN GLYCOSYLATED A1C: CPT | Mod: QW

## 2023-06-13 PROCEDURE — 99214 OFFICE O/P EST MOD 30 MIN: CPT | Mod: 25

## 2023-06-13 RX ORDER — SITAGLIPTIN 25 MG/1
25 TABLET, FILM COATED ORAL DAILY
Qty: 90 | Refills: 1 | Status: DISCONTINUED | COMMUNITY
Start: 2022-05-12 | End: 2023-06-13

## 2023-06-13 NOTE — REVIEW OF SYSTEMS
[Memory Loss] : memory loss [Negative] : Psychiatric [Headache] : no headache [Dizziness] : no dizziness [Fainting] : no fainting [Confusion] : no confusion

## 2023-06-13 NOTE — ASSESSMENT
[FreeTextEntry1] : Dementia:\par Patient has vascular dementia she is on memantine 10 mg twice daily.\par Her cognitive function is declining.\par Her appetite is decreasing , she is losing weight.  Seen by neurology.  \par \par Hypertension:\par Blood pressure is on goal today on valsartan 160 mg.\par She will continue current medication.\par Advised to continue low-salt diet.\par \par Type 2 diabetes:\par A1c in office is 5.4.\par Discontinue Januvia.  Continue metformin 850 mg twice a day.\par \par MALT Lymphoma:\par under the care of oncology .  No treatment or intervention at this point considering her age and dementia\par \par Hypothyroidism:\par Thyroid nodule biopsy showed Hurthle cell nodules intermediate for malignancy.  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

## 2023-07-20 ENCOUNTER — APPOINTMENT (OUTPATIENT)
Dept: FAMILY MEDICINE | Facility: CLINIC | Age: 83
End: 2023-07-20
Payer: MEDICARE

## 2023-07-20 VITALS
HEART RATE: 69 BPM | TEMPERATURE: 96.9 F | BODY MASS INDEX: 26.93 KG/M2 | HEIGHT: 63 IN | RESPIRATION RATE: 18 BRPM | WEIGHT: 152 LBS | SYSTOLIC BLOOD PRESSURE: 130 MMHG | DIASTOLIC BLOOD PRESSURE: 65 MMHG | OXYGEN SATURATION: 96 %

## 2023-07-20 DIAGNOSIS — Z85.89 PERSONAL HISTORY OF MALIGNANT NEOPLASM OF OTHER ORGANS AND SYSTEMS: ICD-10-CM

## 2023-07-20 DIAGNOSIS — E11.9 TYPE 2 DIABETES MELLITUS W/OUT COMPLICATIONS: ICD-10-CM

## 2023-07-20 DIAGNOSIS — M25.50 PAIN IN UNSPECIFIED JOINT: ICD-10-CM

## 2023-07-20 PROCEDURE — 99214 OFFICE O/P EST MOD 30 MIN: CPT | Mod: 25

## 2023-07-20 PROCEDURE — 36415 COLL VENOUS BLD VENIPUNCTURE: CPT

## 2023-07-20 NOTE — HISTORY OF PRESENT ILLNESS
[FreeTextEntry8] : Ms. TAVARES DIAZ is a 83 year old female presenting for body ache.\par states she woke up today morning complaining of body aches.  She denies fever but has noticed that she has some night sweats for many months.\par States her appetite is low and she has lost 25 pounds since December.\par She has a history of lymphoma and was recently seen by Dr. Franz. Hx of Lymphoma and colon cancer\par Daughter is concerned about UTI. She as not had one recently. She states that she will complain and then forget a little later.\par Hx of hypothyroidism Last labs 10/22\par Endo Dr Lorenzana last visit 1/22\par DM came off the Januvia 6/23. A1C was 5.4 6/23.\par

## 2023-07-20 NOTE — ASSESSMENT
[FreeTextEntry1] : Myalgia/ weight loss/ Hot flashes\par Check TSH/ T4\par Hx of hypothyroidism\par Labs done by oncologist recently\par \par DM off Januvia 6/23\par Advised if FS okay and A1C low reduce and possibly discontinue metformin too.\par Has follow up With Dr Bower.

## 2023-07-20 NOTE — PHYSICAL EXAM
[Normal Sclera/Conjunctiva] : normal sclera/conjunctiva [Normal Outer Ear/Nose] : the outer ears and nose were normal in appearance [No JVD] : no jugular venous distention [No Lymphadenopathy] : no lymphadenopathy [Supple] : supple [No Respiratory Distress] : no respiratory distress  [No Accessory Muscle Use] : no accessory muscle use [Clear to Auscultation] : lungs were clear to auscultation bilaterally [Normal Rate] : normal rate  [Regular Rhythm] : with a regular rhythm [Normal S1, S2] : normal S1 and S2 [No Murmur] : no murmur heard [Normal] : soft, non-tender, non-distended, no masses palpated, no HSM and normal bowel sounds [No CVA Tenderness] : no CVA  tenderness [No Spinal Tenderness] : no spinal tenderness [No Joint Swelling] : no joint swelling [Grossly Normal Strength/Tone] : grossly normal strength/tone [No Rash] : no rash [Speech Grossly Normal] : speech grossly normal [Normal Affect] : the affect was normal [Normal Mood] : the mood was normal

## 2023-07-20 NOTE — REVIEW OF SYSTEMS
[Fever] : no fever [Chills] : no chills [Fatigue] : no fatigue [Hot Flashes] : hot flashes [Night Sweats] : night sweats [Recent Change In Weight] : ~T recent weight change [Dysuria] : no dysuria [Incontinence] : incontinence [Nocturia] : nocturia [Poor Libido] : libido not poor [Hematuria] : no hematuria [Frequency] : no frequency [Vaginal Discharge] : no vaginal discharge [Dysmenorrhea] : no dysmenorrhea [Joint Pain] : joint pain [Joint Stiffness] : joint stiffness [Joint Swelling] : no joint swelling [Muscle Weakness] : no muscle weakness [Muscle Pain] : no muscle pain [Back Pain] : back pain [Headache] : no headache [Dizziness] : no dizziness [Fainting] : no fainting [Confusion] : confusion [Memory Loss] : memory loss [Unsteady Walking] : no ataxia [Negative] : Heme/Lymph [de-identified] : has dementia

## 2023-07-23 ENCOUNTER — TRANSCRIPTION ENCOUNTER (OUTPATIENT)
Age: 83
End: 2023-07-23

## 2023-07-23 LAB
ESTIMATED AVERAGE GLUCOSE: 117 MG/DL
HBA1C MFR BLD HPLC: 5.7 %
T4 FREE SERPL-MCNC: 1.4 NG/DL
TSH SERPL-ACNC: 1.37 UIU/ML

## 2023-07-24 ENCOUNTER — TRANSCRIPTION ENCOUNTER (OUTPATIENT)
Age: 83
End: 2023-07-24

## 2023-07-24 LAB — BACTERIA UR CULT: NORMAL

## 2023-08-01 ENCOUNTER — RX RENEWAL (OUTPATIENT)
Age: 83
End: 2023-08-01

## 2023-08-14 ENCOUNTER — APPOINTMENT (OUTPATIENT)
Dept: OBGYN | Facility: CLINIC | Age: 83
End: 2023-08-14
Payer: MEDICARE

## 2023-08-14 VITALS
BODY MASS INDEX: 26.4 KG/M2 | WEIGHT: 149 LBS | HEIGHT: 63 IN | SYSTOLIC BLOOD PRESSURE: 140 MMHG | RESPIRATION RATE: 18 BRPM | HEART RATE: 70 BPM | DIASTOLIC BLOOD PRESSURE: 70 MMHG

## 2023-08-14 DIAGNOSIS — Z01.419 ENCOUNTER FOR GYNECOLOGICAL EXAMINATION (GENERAL) (ROUTINE) W/OUT ABNORMAL FINDINGS: ICD-10-CM

## 2023-08-14 DIAGNOSIS — N89.8 OTHER SPECIFIED NONINFLAMMATORY DISORDERS OF VAGINA: ICD-10-CM

## 2023-08-14 PROCEDURE — 99214 OFFICE O/P EST MOD 30 MIN: CPT

## 2023-08-14 NOTE — PHYSICAL EXAM
[Chaperone Declined] : Patient declined chaperone [Appropriately responsive] : appropriately responsive [Alert] : alert [No Acute Distress] : no acute distress [No Lymphadenopathy] : no lymphadenopathy [Soft] : soft [Non-tender] : non-tender [Non-distended] : non-distended [No HSM] : No HSM [No Lesions] : no lesions [No Mass] : no mass [Oriented x3] : oriented x3 [Examination Of The Breasts] : a normal appearance [No Masses] : no breast masses were palpable [Labia Majora] : normal [Labia Minora] : normal [Normal] : normal [Uterine Adnexae] : normal [FreeTextEntry1] : multiple sebaceous cysts on labia

## 2023-08-17 ENCOUNTER — TRANSCRIPTION ENCOUNTER (OUTPATIENT)
Age: 83
End: 2023-08-17

## 2023-08-17 LAB
CANDIDA VAG CYTO: NOT DETECTED
G VAGINALIS+PREV SP MTYP VAG QL MICRO: DETECTED
T VAGINALIS VAG QL WET PREP: NOT DETECTED

## 2023-08-21 ENCOUNTER — TRANSCRIPTION ENCOUNTER (OUTPATIENT)
Age: 83
End: 2023-08-21

## 2023-08-21 DIAGNOSIS — B96.89 ACUTE VAGINITIS: ICD-10-CM

## 2023-08-21 DIAGNOSIS — N76.0 ACUTE VAGINITIS: ICD-10-CM

## 2023-09-05 ENCOUNTER — TRANSCRIPTION ENCOUNTER (OUTPATIENT)
Age: 83
End: 2023-09-05

## 2023-09-12 ENCOUNTER — TRANSCRIPTION ENCOUNTER (OUTPATIENT)
Age: 83
End: 2023-09-12

## 2023-09-21 ENCOUNTER — NON-APPOINTMENT (OUTPATIENT)
Age: 83
End: 2023-09-21

## 2023-09-21 ENCOUNTER — TRANSCRIPTION ENCOUNTER (OUTPATIENT)
Age: 83
End: 2023-09-21

## 2023-10-13 ENCOUNTER — RX RENEWAL (OUTPATIENT)
Age: 83
End: 2023-10-13

## 2023-10-17 ENCOUNTER — NON-APPOINTMENT (OUTPATIENT)
Age: 83
End: 2023-10-17

## 2023-10-17 ENCOUNTER — APPOINTMENT (OUTPATIENT)
Dept: INTERNAL MEDICINE | Facility: CLINIC | Age: 83
End: 2023-10-17
Payer: MEDICARE

## 2023-10-17 VITALS
SYSTOLIC BLOOD PRESSURE: 141 MMHG | OXYGEN SATURATION: 97 % | HEART RATE: 53 BPM | BODY MASS INDEX: 25.87 KG/M2 | RESPIRATION RATE: 17 BRPM | HEIGHT: 63 IN | DIASTOLIC BLOOD PRESSURE: 78 MMHG | WEIGHT: 146 LBS | TEMPERATURE: 97.3 F

## 2023-10-17 DIAGNOSIS — M62.81 MUSCLE WEAKNESS (GENERALIZED): ICD-10-CM

## 2023-10-17 DIAGNOSIS — Z13.31 ENCOUNTER FOR SCREENING FOR DEPRESSION: ICD-10-CM

## 2023-10-17 DIAGNOSIS — C88.4 EXTRANODAL MARGINAL ZONE B-CELL LYMPHOMA OF MUCOSA-ASSOCIATED LYMPHOID TISSUE [MALT-LYMPHOMA]: ICD-10-CM

## 2023-10-17 DIAGNOSIS — Z00.00 ENCOUNTER FOR GENERAL ADULT MEDICAL EXAMINATION W/OUT ABNORMAL FINDINGS: ICD-10-CM

## 2023-10-17 DIAGNOSIS — Z23 ENCOUNTER FOR IMMUNIZATION: ICD-10-CM

## 2023-10-17 PROCEDURE — G0444 DEPRESSION SCREEN ANNUAL: CPT

## 2023-10-17 PROCEDURE — 36415 COLL VENOUS BLD VENIPUNCTURE: CPT

## 2023-10-17 PROCEDURE — 93000 ELECTROCARDIOGRAM COMPLETE: CPT | Mod: 59

## 2023-10-17 PROCEDURE — G0439: CPT

## 2023-10-17 PROCEDURE — 90662 IIV NO PRSV INCREASED AG IM: CPT

## 2023-10-17 PROCEDURE — G0008: CPT

## 2023-10-20 LAB
ALBUMIN SERPL ELPH-MCNC: 4.4 G/DL
ALP BLD-CCNC: 63 U/L
ALT SERPL-CCNC: 13 U/L
ANION GAP SERPL CALC-SCNC: 14 MMOL/L
APPEARANCE: ABNORMAL
AST SERPL-CCNC: 16 U/L
BACTERIA: ABNORMAL /HPF
BASOPHILS # BLD AUTO: 0.03 K/UL
BASOPHILS NFR BLD AUTO: 0.5 %
BILIRUB SERPL-MCNC: 0.6 MG/DL
BILIRUBIN URINE: NEGATIVE
BLOOD URINE: ABNORMAL
BUN SERPL-MCNC: 22 MG/DL
CALCIUM OXALATE CRYSTALS: PRESENT
CALCIUM SERPL-MCNC: 10 MG/DL
CAST: 0 /LPF
CHLORIDE SERPL-SCNC: 103 MMOL/L
CHOLEST SERPL-MCNC: 175 MG/DL
CO2 SERPL-SCNC: 23 MMOL/L
COLOR: YELLOW
CREAT SERPL-MCNC: 0.62 MG/DL
EGFR: 87 ML/MIN/1.73M2
EOSINOPHIL # BLD AUTO: 0.13 K/UL
EOSINOPHIL NFR BLD AUTO: 2 %
EPITHELIAL CELLS: 15 /HPF
ESTIMATED AVERAGE GLUCOSE: 114 MG/DL
GLUCOSE QUALITATIVE U: NEGATIVE MG/DL
GLUCOSE SERPL-MCNC: 125 MG/DL
HBA1C MFR BLD HPLC: 5.6 %
HCT VFR BLD CALC: 43.9 %
HDLC SERPL-MCNC: 54 MG/DL
HGB BLD-MCNC: 14.4 G/DL
IMM GRANULOCYTES NFR BLD AUTO: 0.3 %
KETONES URINE: NEGATIVE MG/DL
LDLC SERPL CALC-MCNC: 99 MG/DL
LEUKOCYTE ESTERASE URINE: ABNORMAL
LYMPHOCYTES # BLD AUTO: 2.12 K/UL
LYMPHOCYTES NFR BLD AUTO: 32.7 %
MAN DIFF?: NORMAL
MCHC RBC-ENTMCNC: 30.7 PG
MCHC RBC-ENTMCNC: 32.8 GM/DL
MCV RBC AUTO: 93.6 FL
MICROSCOPIC-UA: NORMAL
MONOCYTES # BLD AUTO: 0.42 K/UL
MONOCYTES NFR BLD AUTO: 6.5 %
NEUTROPHILS # BLD AUTO: 3.77 K/UL
NEUTROPHILS NFR BLD AUTO: 58 %
NITRITE URINE: NEGATIVE
NONHDLC SERPL-MCNC: 121 MG/DL
PH URINE: 5.5
PLATELET # BLD AUTO: 262 K/UL
POTASSIUM SERPL-SCNC: 4.5 MMOL/L
PROT SERPL-MCNC: 6.7 G/DL
PROTEIN URINE: 30 MG/DL
RBC # BLD: 4.69 M/UL
RBC # FLD: 13.6 %
RED BLOOD CELLS URINE: 16 /HPF
REVIEW: NORMAL
SODIUM SERPL-SCNC: 141 MMOL/L
SPECIFIC GRAVITY URINE: 1.02
TRIGL SERPL-MCNC: 123 MG/DL
TSH SERPL-ACNC: 1.39 UIU/ML
UROBILINOGEN URINE: 1 MG/DL
WBC # FLD AUTO: 6.49 K/UL
WHITE BLOOD CELLS URINE: 892 /HPF

## 2023-10-30 ENCOUNTER — RX RENEWAL (OUTPATIENT)
Age: 83
End: 2023-10-30

## 2023-10-30 RX ORDER — MEMANTINE HYDROCHLORIDE 10 MG/1
10 TABLET, FILM COATED ORAL
Qty: 180 | Refills: 3 | Status: ACTIVE | COMMUNITY
Start: 2022-03-28 | End: 1900-01-01

## 2023-10-31 ENCOUNTER — NON-APPOINTMENT (OUTPATIENT)
Age: 83
End: 2023-10-31

## 2023-11-05 ENCOUNTER — NON-APPOINTMENT (OUTPATIENT)
Age: 83
End: 2023-11-05

## 2023-11-06 ENCOUNTER — APPOINTMENT (OUTPATIENT)
Dept: ULTRASOUND IMAGING | Facility: CLINIC | Age: 83
End: 2023-11-06
Payer: MEDICARE

## 2023-11-06 PROCEDURE — 76536 US EXAM OF HEAD AND NECK: CPT

## 2023-11-09 ENCOUNTER — APPOINTMENT (OUTPATIENT)
Dept: SURGERY | Facility: CLINIC | Age: 83
End: 2023-11-09
Payer: MEDICARE

## 2023-11-09 VITALS
OXYGEN SATURATION: 98 % | WEIGHT: 146 LBS | SYSTOLIC BLOOD PRESSURE: 154 MMHG | DIASTOLIC BLOOD PRESSURE: 95 MMHG | BODY MASS INDEX: 25.87 KG/M2 | HEIGHT: 63 IN | HEART RATE: 74 BPM

## 2023-11-09 DIAGNOSIS — E04.2 NONTOXIC MULTINODULAR GOITER: ICD-10-CM

## 2023-11-09 PROCEDURE — 99213 OFFICE O/P EST LOW 20 MIN: CPT

## 2023-11-17 ENCOUNTER — APPOINTMENT (OUTPATIENT)
Dept: FAMILY MEDICINE | Facility: CLINIC | Age: 83
End: 2023-11-17
Payer: MEDICARE

## 2023-11-17 VITALS
TEMPERATURE: 97.5 F | SYSTOLIC BLOOD PRESSURE: 155 MMHG | RESPIRATION RATE: 17 BRPM | DIASTOLIC BLOOD PRESSURE: 78 MMHG | BODY MASS INDEX: 26.58 KG/M2 | WEIGHT: 150 LBS | HEART RATE: 85 BPM | HEIGHT: 63 IN | OXYGEN SATURATION: 97 %

## 2023-11-17 VITALS — DIASTOLIC BLOOD PRESSURE: 60 MMHG | SYSTOLIC BLOOD PRESSURE: 118 MMHG

## 2023-11-17 PROCEDURE — 81003 URINALYSIS AUTO W/O SCOPE: CPT | Mod: QW

## 2023-11-17 PROCEDURE — 99214 OFFICE O/P EST MOD 30 MIN: CPT | Mod: 25

## 2023-11-17 RX ORDER — CEPHALEXIN 500 MG/1
500 CAPSULE ORAL TWICE DAILY
Qty: 10 | Refills: 0 | Status: DISCONTINUED | COMMUNITY
Start: 2022-10-20 | End: 2022-12-22

## 2023-11-22 ENCOUNTER — TRANSCRIPTION ENCOUNTER (OUTPATIENT)
Age: 83
End: 2023-11-22

## 2023-11-22 LAB
BACTERIA UR CULT: NORMAL
BILIRUB UR QL STRIP: NEGATIVE
CLARITY UR: NORMAL
COLLECTION METHOD: NORMAL
GLUCOSE UR-MCNC: NEGATIVE
HCG UR QL: 1 EU/DL
HGB UR QL STRIP.AUTO: NORMAL
KETONES UR-MCNC: NORMAL
LEUKOCYTE ESTERASE UR QL STRIP: NORMAL
NITRITE UR QL STRIP: NEGATIVE
PH UR STRIP: 5.5
PROT UR STRIP-MCNC: NORMAL
SP GR UR STRIP: 1.03

## 2023-11-28 ENCOUNTER — APPOINTMENT (OUTPATIENT)
Dept: UROLOGY | Facility: CLINIC | Age: 83
End: 2023-11-28
Payer: MEDICARE

## 2023-11-28 VITALS
HEIGHT: 63 IN | SYSTOLIC BLOOD PRESSURE: 180 MMHG | DIASTOLIC BLOOD PRESSURE: 88 MMHG | WEIGHT: 150 LBS | BODY MASS INDEX: 26.58 KG/M2

## 2023-11-28 DIAGNOSIS — N39.0 URINARY TRACT INFECTION, SITE NOT SPECIFIED: ICD-10-CM

## 2023-11-28 PROCEDURE — 99204 OFFICE O/P NEW MOD 45 MIN: CPT

## 2023-11-29 LAB
APPEARANCE: CLEAR
BACTERIA: NEGATIVE /HPF
BILIRUBIN URINE: NEGATIVE
BLOOD URINE: NEGATIVE
CALCIUM OXALATE CRYSTALS: PRESENT
CAST: 4 /LPF
COLOR: YELLOW
EPITHELIAL CELLS: 14 /HPF
GLUCOSE QUALITATIVE U: NEGATIVE MG/DL
HYALINE CASTS: PRESENT
KETONES URINE: NEGATIVE MG/DL
LEUKOCYTE ESTERASE URINE: ABNORMAL
MICROSCOPIC-UA: NORMAL
NITRITE URINE: NEGATIVE
PH URINE: 5
PROTEIN URINE: NORMAL MG/DL
RED BLOOD CELLS URINE: NORMAL /HPF
REVIEW: NORMAL
SPECIFIC GRAVITY URINE: 1.02
UROBILINOGEN URINE: 0.2 MG/DL
WHITE BLOOD CELLS URINE: 7 /HPF

## 2023-12-01 LAB — URINE CYTOLOGY: NORMAL

## 2023-12-02 LAB — BACTERIA UR CULT: ABNORMAL

## 2023-12-14 ENCOUNTER — OUTPATIENT (OUTPATIENT)
Dept: OUTPATIENT SERVICES | Facility: HOSPITAL | Age: 83
LOS: 1 days | End: 2023-12-14
Payer: MEDICARE

## 2023-12-14 ENCOUNTER — APPOINTMENT (OUTPATIENT)
Dept: ULTRASOUND IMAGING | Facility: CLINIC | Age: 83
End: 2023-12-14
Payer: MEDICARE

## 2023-12-14 DIAGNOSIS — N39.0 URINARY TRACT INFECTION, SITE NOT SPECIFIED: ICD-10-CM

## 2023-12-14 PROCEDURE — 76770 US EXAM ABDO BACK WALL COMP: CPT

## 2023-12-14 PROCEDURE — 76770 US EXAM ABDO BACK WALL COMP: CPT | Mod: 26

## 2023-12-18 ENCOUNTER — APPOINTMENT (OUTPATIENT)
Dept: NEUROLOGY | Facility: CLINIC | Age: 83
End: 2023-12-18
Payer: MEDICARE

## 2023-12-18 VITALS
SYSTOLIC BLOOD PRESSURE: 124 MMHG | BODY MASS INDEX: 25.52 KG/M2 | WEIGHT: 144 LBS | DIASTOLIC BLOOD PRESSURE: 72 MMHG | HEIGHT: 63 IN | TEMPERATURE: 97.8 F | HEART RATE: 66 BPM

## 2023-12-18 PROCEDURE — 99213 OFFICE O/P EST LOW 20 MIN: CPT

## 2023-12-18 RX ORDER — CIPROFLOXACIN HYDROCHLORIDE 500 MG/1
500 TABLET, FILM COATED ORAL TWICE DAILY
Qty: 20 | Refills: 0 | Status: DISCONTINUED | COMMUNITY
Start: 2023-10-20 | End: 2023-12-18

## 2023-12-18 RX ORDER — METRONIDAZOLE 500 MG/1
500 TABLET ORAL TWICE DAILY
Qty: 14 | Refills: 0 | Status: DISCONTINUED | COMMUNITY
Start: 2023-08-21 | End: 2023-12-18

## 2023-12-18 RX ORDER — CLINDAMYCIN PHOSPHATE 20 MG/G
2 CREAM VAGINAL
Qty: 1 | Refills: 0 | Status: DISCONTINUED | COMMUNITY
Start: 2023-08-17 | End: 2023-12-18

## 2023-12-18 RX ORDER — CEFUROXIME AXETIL 250 MG/1
250 TABLET ORAL
Qty: 10 | Refills: 0 | Status: DISCONTINUED | COMMUNITY
Start: 2023-11-17 | End: 2023-12-18

## 2023-12-18 NOTE — REVIEW OF SYSTEMS
[As Noted in HPI] : as noted in HPI [Confused or Disoriented] : confusion [Memory Lapses or Loss] : memory loss [Decr. Concentrating Ability] : decreased concentrating ability [Difficulty with Language] : ~M difficulty with language [Changed Thought Patterns] : changed thought patterns [Facial Weakness] : no facial weakness [Arm Weakness] : no arm weakness [Hand Weakness] : no hand weakness [Leg Weakness] : no leg weakness [Abnormal Sensation] : no abnormal sensation [Dizziness] : no dizziness [Difficulty Walking] : no difficulty walking [Frequent Falls] : not falling [Change In Personality] : personality change [Negative] : Heme/Lymph

## 2023-12-18 NOTE — HISTORY OF PRESENT ILLNESS
[FreeTextEntry1] : 83-year-old woman history of hypertension, diabetes, dementia mixed type, accompanied by her daughter.  No new medical issues or concerns, being followed by oncology for lymphoma although not treated, follow-up for thyroid nodule, just watched.  No recent falls or injuries, no recent hospitalizations. Patient continues living at home with her daughter and son-in-law, but daughter reports requires more assistance, more directions, she can still feed herself, dress himself with assistance.  Still able to walk around no reported falls.  Has lost some weight, simply because of it disinterest in eating.  But was able to eat a hamburger, no trouble swallowing, but her desire for food seem to be limited. Having more trouble with word finding, expressing herself, more withdrawn and less interactive.  Sleeping well.  No hallucination no aggressive behavior.

## 2023-12-18 NOTE — DISCUSSION/SUMMARY
[FreeTextEntry1] : 83-year-old woman with mixed type dementia, severe, presently on memantine 10 mg twice a day. Reviewed and discussed care and treatment of the patient. Recommended obtaining help at home. Discussed the use of palliative care, hospice care. At present no change in treatment. Return to office, 1 year.

## 2023-12-18 NOTE — PHYSICAL EXAM
[General Appearance - Alert] : alert [Person] : disoriented to person [Place] : disoriented to place [Time] : disoriented to time [Short Term Intact] : short term memory impaired [Remote Intact] : remote memory impaired [Span Intact] : the attention span was decreased [Concentration Intact] : a decrease in concentrating ability was observed [Visual Intact] : visual attention was ~T ~L decreased [Naming Objects] : difficulty naming common objects [Repeating Phrases] : difficulty repeating a phrase [Writing A Sentence] : difficulty writing a sentence [Fluency] : fluency not intact [Reading] : difficulty reading [Current Events] : inadequate knowledge of current events [Vocabulary] : inadequate range of vocabulary [Cranial Nerves Optic (II)] : visual acuity intact bilaterally,  visual fields full to confrontation, pupils equal round and reactive to light [Cranial Nerves Oculomotor (III)] : extraocular motion intact [Cranial Nerves Trigeminal (V)] : facial sensation intact symmetrically [Cranial Nerves Vestibulocochlear (VIII)] : hearing was intact bilaterally [Cranial Nerves Facial (VII)] : face symmetrical [Cranial Nerves Hypoglossal (XII)] : there was no tongue deviation with protrusion [Cranial Nerves Accessory (XI - Cranial And Spinal)] : head turning and shoulder shrug symmetric [Motor Strength] : muscle strength was normal in all four extremities [Motor Handedness Right-Handed] : the patient is right hand dominant [Sensation Pain / Temperature Decrease] : pain and temperature was intact [Dysdiadochokinesia Bilaterally] : not present [1+] : Patella left 1+ [0] : Ankle jerk left 0 [FreeTextEntry8] : Slightly wide-based gait

## 2023-12-19 ENCOUNTER — APPOINTMENT (OUTPATIENT)
Dept: UROLOGY | Facility: CLINIC | Age: 83
End: 2023-12-19
Payer: MEDICARE

## 2023-12-19 VITALS
DIASTOLIC BLOOD PRESSURE: 78 MMHG | SYSTOLIC BLOOD PRESSURE: 136 MMHG | HEIGHT: 63 IN | HEART RATE: 65 BPM | OXYGEN SATURATION: 96 % | WEIGHT: 144 LBS | TEMPERATURE: 97.7 F | BODY MASS INDEX: 25.52 KG/M2 | RESPIRATION RATE: 14 BRPM

## 2023-12-19 DIAGNOSIS — R39.9 UNSPECIFIED SYMPTOMS AND SIGNS INVOLVING THE GENITOURINARY SYSTEM: ICD-10-CM

## 2023-12-19 DIAGNOSIS — N34.3 URETHRAL SYNDROME, UNSPECIFIED: ICD-10-CM

## 2023-12-19 PROCEDURE — 52285 CYSTOSCOPY AND TREATMENT: CPT

## 2023-12-19 PROCEDURE — 99212 OFFICE O/P EST SF 10 MIN: CPT | Mod: 25

## 2023-12-20 NOTE — ADDENDUM
[FreeTextEntry1] : I, Teresa Mukherjee, acted as a scribe on behalf of Dr. Khan 12/19/2023.   All medical record entries made by the Scribe were at my, Dr.Kip Khan, direction and personally dictated by me on 12/19/2023. I have reviewed the chart and agree that the record accurately reflects my personal performance of the history, physical exam, assessment and plan. I have also personally directed, reviewed, and agreed with the chart.

## 2023-12-20 NOTE — END OF VISIT
[FreeTextEntry3] : I told the daughter that it was little that could be done outside of catheterization and as she is getting along reasonably well, I would advise observing her at this time.They will call back with any change.

## 2023-12-20 NOTE — HISTORY OF PRESENT ILLNESS
[FreeTextEntry1] : Follow-up 82y/o F patient is here with her daughter. Her urine analysis and culture had shown a low-grade contaminant and her sonogram of the kidneys and bladder was fine. Cystoscopy revealed that although she empties her bladder, she does not do so completely and is generally debris-filled.

## 2023-12-21 ENCOUNTER — APPOINTMENT (OUTPATIENT)
Dept: ENDOCRINOLOGY | Facility: CLINIC | Age: 83
End: 2023-12-21
Payer: MEDICARE

## 2023-12-21 VITALS
HEART RATE: 67 BPM | WEIGHT: 145 LBS | OXYGEN SATURATION: 99 % | RESPIRATION RATE: 14 BRPM | DIASTOLIC BLOOD PRESSURE: 67 MMHG | BODY MASS INDEX: 25.69 KG/M2 | HEIGHT: 63 IN | SYSTOLIC BLOOD PRESSURE: 134 MMHG | TEMPERATURE: 97.2 F

## 2023-12-21 DIAGNOSIS — E78.00 PURE HYPERCHOLESTEROLEMIA, UNSPECIFIED: ICD-10-CM

## 2023-12-21 DIAGNOSIS — E03.9 HYPOTHYROIDISM, UNSPECIFIED: ICD-10-CM

## 2023-12-21 PROCEDURE — 99214 OFFICE O/P EST MOD 30 MIN: CPT | Mod: 25

## 2023-12-21 NOTE — REASON FOR VISIT
[Initial Evaluation] : an initial evaluation [DM Type 2] : DM Type 2 [Hypothyroidism] : hypothyroidism [Thyroid nodule/ MNG] : thyroid nodule/ MNG [Family Member] : family member

## 2023-12-21 NOTE — PHYSICAL EXAM
[No Stigmata of Cushings Syndrome] : no stigmata of Cushings Syndrome [Normal Reflexes] : deep tendon reflexes were 2+ and symmetric [No Tremors] : no tremors [Oriented x3] : oriented to person, place, and time [Abdominal Striae] : no abdominal striae [Acanthosis Nigricans] : no acanthosis nigricans [de-identified] : BMI 30  [de-identified] : +memory loss of recent and remote events.

## 2023-12-21 NOTE — HISTORY OF PRESENT ILLNESS
[FreeTextEntry1] : Ms. Luna is presenting for follow up care for her hypothyroidism, diabetes and thyroid nodule.  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 She is referred by Oncologist Dr. Franz to rule out thyroid cancer as recent PET scan showed 2 nodules.   #Hypothyroidism  Diagnosed at least 10 years ago.  TSH 1.39 Oct 2023 Levothyroxine dose 75mcg QD has been stable.  Daughter has PTC, h/o thyroidectomy.   #Thyroid Nodule  FDG avid low-attenuation left lower thyroid lobe substernal focus, 1.4 cm (image 74), SUV 10.2; previous SUV 5.6. Smaller focus in the posterior right upper thyroid lobe, SUV 8.0 (image 71); previous SUV 3.6. Physiologic FDG activity in visualized portions of the brain, major salivary glands, and neck muscles. FNA of Left lower thyroid nodule Oct 2021 was [place] category 3, positive for NRAS mutation (confers a ~50% probability of cancer or NIFTP). Evaluated by Dr. Kebede, recommended thyroidectomy or lobectomy but patient's daughter is hesitating as the results are still "somewhat inconclusive".  2022: FNA cytology of 3 nodules: RIght upper bethesda 2, right lower posterior bethesda 2, right lower anterior AUS/FLUS ([place] III) THYROSEQ  V3 GC RESULTS SUMMARY RIGHT LOWER ANTERIOR THYROID, 1.0 CM NODULE, FNA Test Result Probability of Cancer or NIFTP Potential Management NEGATIVE  Pt was seen by her oncologist.  Repeat Thyroid sonongram Dec 2021: unchanged from previous in 2021.   #Diabetes type 2 DM for over 10 years.  Reports no microvascular complications, no history of retinopathy, nephropathy or neuropathy. Reports no history of cardiovascular risk factors, no history of CAD, CHF or CVA in the past.   Current DM meds: Metformin 500mg BID  Prior DM meds: Metformin 500mg-glipizide 2.5mg 1-2x, Januvia 100mg QD Other pertinent meds:   POCT A1c%: 6.6% --> 5.6% Oct 2023 POCT B  FSG: Freestyle lite  Pre-Breakfast: 109-111, 207 Hypoglycemic episodes: none   Diet:  Breakfast: Grits with cheese and torres  Lunch: Grits with cheese  Dinner: chicken or salmon or turkey burgers with salad or veggies.  Snacks: Fruits: once in a while has banana or apple.    Exercise: Walks on occasion.  Urine micro: 2021 wnl ARB: losartan 100mg  C-peptide:  Cr: 0.50 GFR: 75 Lipid profile: LDL 70 May 2021 Statin: lipitor 10mg  HbA1c%: 6.6% May 2021.  Ophthalmology: 2020, cataract surgery. No DR.  Neuropathy: None  Podiatry/Diabetic foot exam: Podiatry 2021. F/u 2021.     Interval hx:  Pt not seen since 2022.  Now only on metformin 500mg BID. A1c 5.6%.  Has had frequent UTIs.

## 2023-12-21 NOTE — ASSESSMENT
[Diabetes Foot Care] : diabetes foot care [Carbohydrate Consistent Diet] : carbohydrate consistent diet [Importance of Diet and Exercise] : importance of diet and exercise to improve glycemic control, achieve weight loss and improve cardiovascular health [Exercise/Effect on Glucose] : exercise/effect on glucose [Hypoglycemia Management] : hypoglycemia management [Self Monitoring of Blood Glucose] : self monitoring of blood glucose [Retinopathy Screening] : Patient was referred to ophthalmology for retinopathy screening [Diabetic Medications] : Risks and benefits of diabetic medications were discussed [Levothyroxine] : The patient was instructed to take Levothyroxine on an empty stomach, separate from vitamins, and wait at least 30 minutes before eating [FreeTextEntry1] : 83 year old female with PMH of colon cancer 2017, HTN, mild cognitive disorder (memory loss), (1/15/17 right hemicolectomy pT3, N0 (0/16) moderately differentiated adenocarcinoma of sigmoid flexure), 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  1. Hypothyroidism Diagnosed at least 10 years ago. TSH wnl Oct 2023 Continue Levothyroxine dose 75mcg once daily in AM  2. Thyroid Nodule FDG avid low-attenuation left lower thyroid lobe substernal focus, 1.4 cm (image 74). Smaller focus in the posterior right upper thyroid lobe. FNA revealed Gilbert 3, positive for NRAS mutation in 2022. F/u with Dr. Kebede Thyroid USG Nov 2023 reviewed.   3. Diabetes type 2, HbA1c 6.6% May 2021 --> 5.6% Oct 2023 -Pt's A1c is at goal, <7%. -Continue metformin 500mg -LDL at goal, continue statin 10mg -Microalbumin next visit  -Opthal UTSTACI, has appt -Continue to check SMBG a few times per week.   Patient verbalized understanding of the above. All questions were answered to patient's satisfaction. Dispo: Patient will follow up as needed.

## 2024-01-26 ENCOUNTER — APPOINTMENT (OUTPATIENT)
Dept: FAMILY MEDICINE | Facility: CLINIC | Age: 84
End: 2024-01-26

## 2024-01-29 ENCOUNTER — RX RENEWAL (OUTPATIENT)
Age: 84
End: 2024-01-29

## 2024-01-31 ENCOUNTER — APPOINTMENT (OUTPATIENT)
Dept: INTERNAL MEDICINE | Facility: CLINIC | Age: 84
End: 2024-01-31

## 2024-02-05 ENCOUNTER — APPOINTMENT (OUTPATIENT)
Dept: INTERNAL MEDICINE | Facility: CLINIC | Age: 84
End: 2024-02-05
Payer: MEDICARE

## 2024-02-05 VITALS
HEIGHT: 63 IN | DIASTOLIC BLOOD PRESSURE: 98 MMHG | SYSTOLIC BLOOD PRESSURE: 165 MMHG | OXYGEN SATURATION: 96 % | HEART RATE: 72 BPM | WEIGHT: 146 LBS | TEMPERATURE: 97.6 F | RESPIRATION RATE: 14 BRPM | BODY MASS INDEX: 25.87 KG/M2

## 2024-02-05 VITALS — DIASTOLIC BLOOD PRESSURE: 80 MMHG | SYSTOLIC BLOOD PRESSURE: 130 MMHG

## 2024-02-05 DIAGNOSIS — F03.90 UNSPECIFIED DEMENTIA W/OUT BEHAVIORAL DISTURBANCE: ICD-10-CM

## 2024-02-05 DIAGNOSIS — I10 ESSENTIAL (PRIMARY) HYPERTENSION: ICD-10-CM

## 2024-02-05 DIAGNOSIS — E11.9 TYPE 2 DIABETES MELLITUS W/OUT COMPLICATIONS: ICD-10-CM

## 2024-02-05 DIAGNOSIS — R82.90 UNSPECIFIED ABNORMAL FINDINGS IN URINE: ICD-10-CM

## 2024-02-05 PROCEDURE — G2211 COMPLEX E/M VISIT ADD ON: CPT

## 2024-02-05 PROCEDURE — 99214 OFFICE O/P EST MOD 30 MIN: CPT

## 2024-02-05 PROCEDURE — 36415 COLL VENOUS BLD VENIPUNCTURE: CPT

## 2024-02-05 NOTE — ASSESSMENT
[FreeTextEntry1] : Dementia: Patient has vascular dementia she is on memantine 10 mg twice daily. Her cognitive function is currently stable. As per the son-in-law she is fully dependent in the bathroom and dressing.  She can eat independently that also varies some days. Her mood is okay she is always happy there is no sundowning syndrome. Sleep pattern is okay Her weight remains stable.  Hypertension: Blood pressure was elevated on triage. I manually rechecked came down to 130/80.  She is: Currently on valsartan 160 mg once a day. She will continue current medication. Advised to continue low-salt diet.  Type 2 diabetes: Ordered A1c.  She is currently onmetformin 500 mg twice a day.  MALT Lymphoma: under the care of oncology .  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  Family opted for monitoring currently euthyroid on levothyroxine 75 mcq.  Non allergic rhinitis: Using azelastine nasal spray.  Abnormal urinalysis: Patient seen urology cystoscopy shows incomplete evacuation of bladder no intervention at this point

## 2024-02-05 NOTE — HISTORY OF PRESENT ILLNESS
[FreeTextEntry1] : Here for f/u  type 2 dm, HTN.  [de-identified] : Ms. DIAZ is here for a f/u,here with son in law. type 2 DM , she is on metformin 500 bid.  Her weight is stable since last visit.  No change in her appetite.  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  Family opted for monitoring. Low-grade NHL MALT lymphoma considering her age and dementia no treatment at this point. Seen urology recently for frequent UTI and blood in the urine.  Cystoscopy showed incomplete evacuation of bladder.

## 2024-02-05 NOTE — REVIEW OF SYSTEMS
[Headache] : no headache [Dizziness] : no dizziness [Fainting] : no fainting [Confusion] : no confusion [Memory Loss] : memory loss [Negative] : Psychiatric

## 2024-02-07 LAB
ALBUMIN SERPL ELPH-MCNC: 4.6 G/DL
ALP BLD-CCNC: 72 U/L
ALT SERPL-CCNC: 13 U/L
ANION GAP SERPL CALC-SCNC: 12 MMOL/L
AST SERPL-CCNC: 16 U/L
BILIRUB SERPL-MCNC: 0.6 MG/DL
BUN SERPL-MCNC: 20 MG/DL
CALCIUM SERPL-MCNC: 10 MG/DL
CHLORIDE SERPL-SCNC: 102 MMOL/L
CO2 SERPL-SCNC: 26 MMOL/L
CREAT SERPL-MCNC: 0.71 MG/DL
EGFR: 83 ML/MIN/1.73M2
ESTIMATED AVERAGE GLUCOSE: 128 MG/DL
GLUCOSE SERPL-MCNC: 170 MG/DL
HBA1C MFR BLD HPLC: 6.1 %
POTASSIUM SERPL-SCNC: 4.7 MMOL/L
PROT SERPL-MCNC: 6.6 G/DL
SODIUM SERPL-SCNC: 140 MMOL/L

## 2024-02-26 NOTE — HISTORY OF PRESENT ILLNESS
Pt informed    [FreeTextEntry1] : Here for f/u  type 2 dm, HTN.\par c/o pain in the right hand , rash in the buttock. [de-identified] : Ms. DIAZ is here for a f/u.\par She is here today with her daughter you gives the history . pt. 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  Family opted for monitoring.\par Low-grade NHL MALT lymphoma considering her age and dementia no treatment at this point.\par Complain of stiffness in the right hand hard to make a fist.\par Bluish discoloration both side of the buttock area.\par

## 2024-04-03 ENCOUNTER — RX RENEWAL (OUTPATIENT)
Age: 84
End: 2024-04-03

## 2024-04-03 RX ORDER — LEVOTHYROXINE SODIUM 0.07 MG/1
75 TABLET ORAL
Qty: 90 | Refills: 3 | Status: ACTIVE | COMMUNITY
Start: 2020-07-21 | End: 1900-01-01

## 2024-04-10 ENCOUNTER — RX RENEWAL (OUTPATIENT)
Age: 84
End: 2024-04-10

## 2024-04-10 RX ORDER — VALSARTAN 160 MG/1
160 TABLET, COATED ORAL DAILY
Qty: 90 | Refills: 1 | Status: ACTIVE | COMMUNITY
Start: 2021-10-05 | End: 1900-01-01

## 2024-04-12 ENCOUNTER — RX RENEWAL (OUTPATIENT)
Age: 84
End: 2024-04-12

## 2024-04-12 RX ORDER — METFORMIN HYDROCHLORIDE 500 MG/1
500 TABLET, COATED ORAL
Qty: 180 | Refills: 1 | Status: ACTIVE | COMMUNITY
Start: 2022-01-20 | End: 1900-01-01

## 2024-04-29 ENCOUNTER — RX RENEWAL (OUTPATIENT)
Age: 84
End: 2024-04-29

## 2024-04-29 RX ORDER — ATORVASTATIN CALCIUM 10 MG/1
10 TABLET, FILM COATED ORAL DAILY
Qty: 90 | Refills: 0 | Status: ACTIVE | COMMUNITY
Start: 2023-02-02 | End: 1900-01-01

## 2024-05-13 ENCOUNTER — TRANSCRIPTION ENCOUNTER (OUTPATIENT)
Age: 84
End: 2024-05-13

## 2024-06-07 ENCOUNTER — NON-APPOINTMENT (OUTPATIENT)
Age: 84
End: 2024-06-07

## 2024-06-12 ENCOUNTER — TRANSCRIPTION ENCOUNTER (OUTPATIENT)
Age: 84
End: 2024-06-12

## 2024-06-18 ENCOUNTER — OUTPATIENT (OUTPATIENT)
Dept: OUTPATIENT SERVICES | Facility: HOSPITAL | Age: 84
LOS: 1 days | End: 2024-06-18
Payer: MEDICARE

## 2024-06-18 ENCOUNTER — APPOINTMENT (OUTPATIENT)
Dept: INTERNAL MEDICINE | Facility: CLINIC | Age: 84
End: 2024-06-18
Payer: MEDICARE

## 2024-06-18 ENCOUNTER — NON-APPOINTMENT (OUTPATIENT)
Age: 84
End: 2024-06-18

## 2024-06-18 ENCOUNTER — APPOINTMENT (OUTPATIENT)
Dept: RADIOLOGY | Facility: CLINIC | Age: 84
End: 2024-06-18
Payer: MEDICARE

## 2024-06-18 VITALS
DIASTOLIC BLOOD PRESSURE: 83 MMHG | SYSTOLIC BLOOD PRESSURE: 137 MMHG | RESPIRATION RATE: 14 BRPM | WEIGHT: 143 LBS | TEMPERATURE: 97.6 F | HEIGHT: 63 IN | OXYGEN SATURATION: 97 % | BODY MASS INDEX: 25.34 KG/M2 | HEART RATE: 84 BPM

## 2024-06-18 DIAGNOSIS — R05.9 COUGH, UNSPECIFIED: ICD-10-CM

## 2024-06-18 PROCEDURE — 99213 OFFICE O/P EST LOW 20 MIN: CPT

## 2024-06-18 PROCEDURE — 71046 X-RAY EXAM CHEST 2 VIEWS: CPT | Mod: 26

## 2024-06-18 PROCEDURE — 71046 X-RAY EXAM CHEST 2 VIEWS: CPT

## 2024-06-18 RX ORDER — AZITHROMYCIN 250 MG/1
250 TABLET, FILM COATED ORAL
Qty: 1 | Refills: 0 | Status: ACTIVE | COMMUNITY
Start: 2024-06-18 | End: 1900-01-01

## 2024-06-20 NOTE — HISTORY OF PRESENT ILLNESS
[FreeTextEntry1] : Cough [de-identified] : cough with green phlegm x 2 weeks already had a course of steroids had negative covid swab already send for CXR

## 2024-07-08 DIAGNOSIS — Z87.42 PERSONAL HISTORY OF OTHER DISEASES OF THE FEMALE GENITAL TRACT: ICD-10-CM

## 2024-07-08 DIAGNOSIS — N39.0 URINARY TRACT INFECTION, SITE NOT SPECIFIED: ICD-10-CM

## 2024-07-08 DIAGNOSIS — Z86.39 PERSONAL HISTORY OF OTHER ENDOCRINE, NUTRITIONAL AND METABOLIC DISEASE: ICD-10-CM

## 2024-07-08 DIAGNOSIS — Z13.39 ENCOUNTER FOR SCREENING EXAM FOR OTHER MENTAL HEALTH AND BEHAVIORAL DISORDERS: ICD-10-CM

## 2024-07-09 ENCOUNTER — OUTPATIENT (OUTPATIENT)
Dept: OUTPATIENT SERVICES | Facility: HOSPITAL | Age: 84
LOS: 1 days | Discharge: HOME | End: 2024-07-09

## 2024-07-09 DIAGNOSIS — C18.9 MALIGNANT NEOPLASM OF COLON, UNSPECIFIED: ICD-10-CM

## 2024-07-10 ENCOUNTER — APPOINTMENT (OUTPATIENT)
Dept: HEMATOLOGY ONCOLOGY | Facility: CLINIC | Age: 84
End: 2024-07-10
Payer: MEDICARE

## 2024-07-10 ENCOUNTER — RESULT REVIEW (OUTPATIENT)
Age: 84
End: 2024-07-10

## 2024-07-10 DIAGNOSIS — C88.4 EXTRANODAL MARGINAL ZONE B-CELL LYMPHOMA OF MUCOSA-ASSOCIATED LYMPHOID TISSUE [MALT-LYMPHOMA]: ICD-10-CM

## 2024-07-10 LAB
BASOPHILS # BLD AUTO: 0.02 K/UL — SIGNIFICANT CHANGE UP (ref 0–0.2)
BASOPHILS NFR BLD AUTO: 0.3 % — SIGNIFICANT CHANGE UP (ref 0–2)
EOSINOPHIL # BLD AUTO: 0.14 K/UL — SIGNIFICANT CHANGE UP (ref 0–0.5)
EOSINOPHIL NFR BLD AUTO: 2.3 % — SIGNIFICANT CHANGE UP (ref 0–6)
HCT VFR BLD CALC: 38.9 % — SIGNIFICANT CHANGE UP (ref 34.5–45)
HGB BLD-MCNC: 12.9 G/DL — SIGNIFICANT CHANGE UP (ref 11.5–15.5)
IMM GRANULOCYTES NFR BLD AUTO: 0.3 % — SIGNIFICANT CHANGE UP (ref 0–0.9)
LYMPHOCYTES # BLD AUTO: 2.23 K/UL — SIGNIFICANT CHANGE UP (ref 1–3.3)
LYMPHOCYTES # BLD AUTO: 36 % — SIGNIFICANT CHANGE UP (ref 13–44)
MCHC RBC-ENTMCNC: 29.4 PG — SIGNIFICANT CHANGE UP (ref 27–34)
MCHC RBC-ENTMCNC: 33.2 GM/DL — SIGNIFICANT CHANGE UP (ref 32–36)
MCV RBC AUTO: 88.6 FL — SIGNIFICANT CHANGE UP (ref 80–100)
MONOCYTES # BLD AUTO: 0.38 K/UL — SIGNIFICANT CHANGE UP (ref 0–0.9)
MONOCYTES NFR BLD AUTO: 6.1 % — SIGNIFICANT CHANGE UP (ref 2–14)
NEUTROPHILS # BLD AUTO: 3.41 K/UL — SIGNIFICANT CHANGE UP (ref 1.8–7.4)
NEUTROPHILS NFR BLD AUTO: 55 % — SIGNIFICANT CHANGE UP (ref 43–77)
NRBC # BLD: 0 /100 WBCS — SIGNIFICANT CHANGE UP (ref 0–0)
PLATELET # BLD AUTO: 229 K/UL — SIGNIFICANT CHANGE UP (ref 150–400)
RBC # BLD: 4.39 M/UL — SIGNIFICANT CHANGE UP (ref 3.8–5.2)
RBC # FLD: 12.9 % — SIGNIFICANT CHANGE UP (ref 10.3–14.5)
WBC # BLD: 6.2 K/UL — SIGNIFICANT CHANGE UP (ref 3.8–10.5)
WBC # FLD AUTO: 6.2 K/UL — SIGNIFICANT CHANGE UP (ref 3.8–10.5)

## 2024-07-10 PROCEDURE — 99214 OFFICE O/P EST MOD 30 MIN: CPT

## 2024-07-11 DIAGNOSIS — C88.4 EXTRANODAL MARGINAL ZONE B-CELL LYMPHOMA OF MUCOSA-ASSOCIATED LYMPHOID TISSUE [MALT-LYMPHOMA]: ICD-10-CM

## 2024-07-11 LAB
ALBUMIN SERPL ELPH-MCNC: 4.2 G/DL — SIGNIFICANT CHANGE UP (ref 3.3–5)
ALP SERPL-CCNC: 94 U/L — SIGNIFICANT CHANGE UP (ref 40–120)
ALT FLD-CCNC: 10 U/L — SIGNIFICANT CHANGE UP (ref 10–45)
ANION GAP SERPL CALC-SCNC: 14 MMOL/L — SIGNIFICANT CHANGE UP (ref 5–17)
AST SERPL-CCNC: 14 U/L — SIGNIFICANT CHANGE UP (ref 10–40)
BILIRUB SERPL-MCNC: 0.4 MG/DL — SIGNIFICANT CHANGE UP (ref 0.2–1.2)
BUN SERPL-MCNC: 24 MG/DL — HIGH (ref 7–23)
CALCIUM SERPL-MCNC: 9.8 MG/DL — SIGNIFICANT CHANGE UP (ref 8.4–10.5)
CHLORIDE SERPL-SCNC: 104 MMOL/L — SIGNIFICANT CHANGE UP (ref 96–108)
CO2 SERPL-SCNC: 27 MMOL/L — SIGNIFICANT CHANGE UP (ref 22–31)
CREAT SERPL-MCNC: 0.82 MG/DL — SIGNIFICANT CHANGE UP (ref 0.5–1.3)
EGFR: 70 ML/MIN/1.73M2 — SIGNIFICANT CHANGE UP
GLUCOSE SERPL-MCNC: 190 MG/DL — HIGH (ref 70–99)
LDH SERPL L TO P-CCNC: 152 U/L — SIGNIFICANT CHANGE UP (ref 50–242)
POTASSIUM SERPL-MCNC: 4.6 MMOL/L — SIGNIFICANT CHANGE UP (ref 3.5–5.3)
POTASSIUM SERPL-SCNC: 4.6 MMOL/L — SIGNIFICANT CHANGE UP (ref 3.5–5.3)
PROT SERPL-MCNC: 6.5 G/DL — SIGNIFICANT CHANGE UP (ref 6–8.3)
SODIUM SERPL-SCNC: 145 MMOL/L — SIGNIFICANT CHANGE UP (ref 135–145)

## 2024-07-12 ENCOUNTER — TRANSCRIPTION ENCOUNTER (OUTPATIENT)
Age: 84
End: 2024-07-12

## 2024-07-24 ENCOUNTER — APPOINTMENT (OUTPATIENT)
Dept: INTERNAL MEDICINE | Facility: CLINIC | Age: 84
End: 2024-07-24
Payer: MEDICARE

## 2024-07-24 VITALS
OXYGEN SATURATION: 97 % | SYSTOLIC BLOOD PRESSURE: 143 MMHG | DIASTOLIC BLOOD PRESSURE: 85 MMHG | WEIGHT: 144 LBS | BODY MASS INDEX: 25.52 KG/M2 | RESPIRATION RATE: 14 BRPM | HEART RATE: 77 BPM | TEMPERATURE: 97.8 F | HEIGHT: 63 IN

## 2024-07-24 DIAGNOSIS — E03.9 HYPOTHYROIDISM, UNSPECIFIED: ICD-10-CM

## 2024-07-24 DIAGNOSIS — E11.9 TYPE 2 DIABETES MELLITUS W/OUT COMPLICATIONS: ICD-10-CM

## 2024-07-24 DIAGNOSIS — M62.81 MUSCLE WEAKNESS (GENERALIZED): ICD-10-CM

## 2024-07-24 DIAGNOSIS — F03.90 UNSPECIFIED DEMENTIA W/OUT BEHAVIORAL DISTURBANCE: ICD-10-CM

## 2024-07-24 DIAGNOSIS — I10 ESSENTIAL (PRIMARY) HYPERTENSION: ICD-10-CM

## 2024-07-24 LAB — HBA1C MFR BLD HPLC: 7

## 2024-07-24 PROCEDURE — 83036 HEMOGLOBIN GLYCOSYLATED A1C: CPT | Mod: QW

## 2024-07-24 PROCEDURE — G2211 COMPLEX E/M VISIT ADD ON: CPT

## 2024-07-24 PROCEDURE — 99215 OFFICE O/P EST HI 40 MIN: CPT

## 2024-07-24 NOTE — HISTORY OF PRESENT ILLNESS
[FreeTextEntry1] : Here for f/u  type 2 dm, HTN.  [de-identified] : Ms. DIAZ is here for a f/u type 2 DM , she is on metformin 500 bid.  Her weight is stable since last visit.  No change in her appetite.   she is getting PT and OT , as per the daughter she is feeling better , she getting stronger Low-grade NHL MALT lymphoma, recently seen oncology Seen urology recently for frequent UTI and blood in the urine. uses pull ups all the time.

## 2024-07-24 NOTE — ASSESSMENT
[FreeTextEntry1] : Dementia: Patient has vascular dementia she is on memantine 10 mg twice daily. Her cognitive function is currently stable.  she is fully dependent in the bathroom and dressing.  She can eat independently that also varies some days. Her mood is okay she is always happy there is no sundowning syndrome. Sleep pattern is okay Her weight remains stable.  Hypertension: Blood pressure  is within normal range. She will continue current medication. Advised to continue low-salt diet.   Type 2 diabetes: a1c in office 7 , she will continue with metformin 500 mg twice a day.  MALT Lymphoma: under the care of oncology .  No treatment or intervention  no sign of progression labs reviewed.  Generalized weakness Patient is getting OT and PT twice a week her strength is getting better. No episodes of fall.  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  Family opted for monitoring currently euthyroid on levothyroxine 75 mcq.  Non allergic rhinitis: Using azelastine nasal spray.  Abnormal urinalysis: Patient seen urology.

## 2024-07-30 ENCOUNTER — RX RENEWAL (OUTPATIENT)
Age: 84
End: 2024-07-30

## 2024-08-02 ENCOUNTER — APPOINTMENT (OUTPATIENT)
Dept: INTERNAL MEDICINE | Facility: CLINIC | Age: 84
End: 2024-08-02
Payer: MEDICARE

## 2024-08-02 DIAGNOSIS — U07.1 COVID-19: ICD-10-CM

## 2024-08-02 PROCEDURE — 99213 OFFICE O/P EST LOW 20 MIN: CPT

## 2024-08-02 PROCEDURE — G2211 COMPLEX E/M VISIT ADD ON: CPT

## 2024-08-02 NOTE — ASSESSMENT
[FreeTextEntry1] : Patient was seen today as she tested positive for COVID.  Per her daughter her symptoms started on 07 /31 she tested positive on 08/1. See have mild cough she is taking over-the-counter cough medicine.  She denied any other associated symptoms no sinus congestion no shortness of breath no fever no body ache no diarrhea Patient is high risk but her symptoms are very mild I advised her daughter to continue with symptomatic treatment increase water intake and monitor her symptoms if she is feels anything worsening then she needs to take her to the emergency room.

## 2024-08-02 NOTE — REVIEW OF SYSTEMS
[Cough] : cough [Negative] : Cardiovascular [Shortness Of Breath] : no shortness of breath [Wheezing] : no wheezing [Dyspnea on Exertion] : no dyspnea on exertion [FreeTextEntry7] : as hpi

## 2024-08-02 NOTE — HISTORY OF PRESENT ILLNESS
[Home] : at home, [unfilled] , at the time of the visit. [Medical Office: ([facility])___] : at the medical office located in  [Verbal consent obtained from patient] : the patient, [unfilled] [FreeTextEntry8] : Ms. DIAZ was seen today for COVID-positive. Patient have dementia history was given by her daughter. As per her daughter her symptoms started on 07/31 with mild cough.  Daughter stated she was tested positive a week before with mild symptoms. On 8/1 she did a home COVID test which came positive. She never had any fever no sore throat no runny nose no shortness of breath no body ache no diarrhea She has been taking over-the-counter Delsym cough medicine for cough overall she is feeling fine.

## 2024-08-15 ENCOUNTER — APPOINTMENT (OUTPATIENT)
Dept: OBGYN | Facility: CLINIC | Age: 84
End: 2024-08-15

## 2024-10-07 ENCOUNTER — RX RENEWAL (OUTPATIENT)
Age: 84
End: 2024-10-07

## 2024-10-09 ENCOUNTER — RX RENEWAL (OUTPATIENT)
Age: 84
End: 2024-10-09

## 2024-10-23 ENCOUNTER — APPOINTMENT (OUTPATIENT)
Dept: INTERNAL MEDICINE | Facility: CLINIC | Age: 84
End: 2024-10-23
Payer: MEDICARE

## 2024-10-23 ENCOUNTER — RESULT REVIEW (OUTPATIENT)
Age: 84
End: 2024-10-23

## 2024-10-23 ENCOUNTER — NON-APPOINTMENT (OUTPATIENT)
Age: 84
End: 2024-10-23

## 2024-10-23 VITALS
TEMPERATURE: 97.2 F | BODY MASS INDEX: 24.41 KG/M2 | DIASTOLIC BLOOD PRESSURE: 84 MMHG | OXYGEN SATURATION: 97 % | WEIGHT: 143 LBS | SYSTOLIC BLOOD PRESSURE: 147 MMHG | HEART RATE: 69 BPM | HEIGHT: 64 IN

## 2024-10-23 DIAGNOSIS — N63.11 UNSPECIFIED LUMP IN THE RIGHT BREAST, UPPER OUTER QUADRANT: ICD-10-CM

## 2024-10-23 DIAGNOSIS — E11.9 TYPE 2 DIABETES MELLITUS W/OUT COMPLICATIONS: ICD-10-CM

## 2024-10-23 DIAGNOSIS — I10 ESSENTIAL (PRIMARY) HYPERTENSION: ICD-10-CM

## 2024-10-23 PROCEDURE — 99214 OFFICE O/P EST MOD 30 MIN: CPT

## 2024-10-23 PROCEDURE — G2211 COMPLEX E/M VISIT ADD ON: CPT

## 2024-10-24 ENCOUNTER — RX RENEWAL (OUTPATIENT)
Age: 84
End: 2024-10-24

## 2024-10-25 ENCOUNTER — RESULT REVIEW (OUTPATIENT)
Age: 84
End: 2024-10-25

## 2024-10-25 ENCOUNTER — APPOINTMENT (OUTPATIENT)
Dept: ULTRASOUND IMAGING | Facility: CLINIC | Age: 84
End: 2024-10-25
Payer: MEDICARE

## 2024-10-25 ENCOUNTER — OUTPATIENT (OUTPATIENT)
Dept: OUTPATIENT SERVICES | Facility: HOSPITAL | Age: 84
LOS: 1 days | End: 2024-10-25
Payer: MEDICARE

## 2024-10-25 ENCOUNTER — APPOINTMENT (OUTPATIENT)
Dept: MAMMOGRAPHY | Facility: CLINIC | Age: 84
End: 2024-10-25
Payer: MEDICARE

## 2024-10-25 DIAGNOSIS — N63.11 UNSPECIFIED LUMP IN THE RIGHT BREAST, UPPER OUTER QUADRANT: ICD-10-CM

## 2024-10-25 PROCEDURE — 76641 ULTRASOUND BREAST COMPLETE: CPT | Mod: 26,50,GZ

## 2024-10-25 PROCEDURE — 77066 DX MAMMO INCL CAD BI: CPT | Mod: 26

## 2024-10-25 PROCEDURE — G0279: CPT | Mod: 26

## 2024-10-28 ENCOUNTER — RX RENEWAL (OUTPATIENT)
Age: 84
End: 2024-10-28

## 2024-10-28 DIAGNOSIS — R92.8 OTHER ABNORMAL AND INCONCLUSIVE FINDINGS ON DIAGNOSTIC IMAGING OF BREAST: ICD-10-CM

## 2024-10-31 ENCOUNTER — NON-APPOINTMENT (OUTPATIENT)
Age: 84
End: 2024-10-31

## 2024-11-05 ENCOUNTER — TRANSCRIPTION ENCOUNTER (OUTPATIENT)
Age: 84
End: 2024-11-05

## 2024-11-11 ENCOUNTER — RESULT REVIEW (OUTPATIENT)
Age: 84
End: 2024-11-11

## 2024-11-11 ENCOUNTER — OUTPATIENT (OUTPATIENT)
Dept: OUTPATIENT SERVICES | Facility: HOSPITAL | Age: 84
LOS: 1 days | End: 2024-11-11
Payer: MEDICARE

## 2024-11-11 ENCOUNTER — APPOINTMENT (OUTPATIENT)
Dept: ULTRASOUND IMAGING | Facility: CLINIC | Age: 84
End: 2024-11-11
Payer: MEDICARE

## 2024-11-11 DIAGNOSIS — R92.8 OTHER ABNORMAL AND INCONCLUSIVE FINDINGS ON DIAGNOSTIC IMAGING OF BREAST: ICD-10-CM

## 2024-11-11 PROCEDURE — 77065 DX MAMMO INCL CAD UNI: CPT | Mod: 26,RT

## 2024-11-11 PROCEDURE — 88342 IMHCHEM/IMCYTCHM 1ST ANTB: CPT | Mod: 26,59

## 2024-11-11 PROCEDURE — 88341 IMHCHEM/IMCYTCHM EA ADD ANTB: CPT | Mod: 26,59

## 2024-11-11 PROCEDURE — 19083 BX BREAST 1ST LESION US IMAG: CPT | Mod: RT

## 2024-11-11 PROCEDURE — 88305 TISSUE EXAM BY PATHOLOGIST: CPT | Mod: 26

## 2024-11-11 PROCEDURE — 88360 TUMOR IMMUNOHISTOCHEM/MANUAL: CPT | Mod: 26

## 2024-11-14 ENCOUNTER — APPOINTMENT (OUTPATIENT)
Dept: SURGERY | Facility: CLINIC | Age: 84
End: 2024-11-14

## 2024-11-20 PROCEDURE — 88360 TUMOR IMMUNOHISTOCHEM/MANUAL: CPT

## 2024-11-20 PROCEDURE — 88305 TISSUE EXAM BY PATHOLOGIST: CPT

## 2024-11-20 PROCEDURE — A4648: CPT

## 2024-11-20 PROCEDURE — 88341 IMHCHEM/IMCYTCHM EA ADD ANTB: CPT | Mod: XU

## 2024-11-20 PROCEDURE — 88342 IMHCHEM/IMCYTCHM 1ST ANTB: CPT

## 2024-11-20 PROCEDURE — 76641 ULTRASOUND BREAST COMPLETE: CPT

## 2024-11-20 PROCEDURE — 19083 BX BREAST 1ST LESION US IMAG: CPT

## 2024-11-20 PROCEDURE — 77065 DX MAMMO INCL CAD UNI: CPT

## 2024-11-20 PROCEDURE — G0279: CPT

## 2024-11-20 PROCEDURE — 77066 DX MAMMO INCL CAD BI: CPT

## 2024-11-21 ENCOUNTER — OUTPATIENT (OUTPATIENT)
Dept: OUTPATIENT SERVICES | Facility: HOSPITAL | Age: 84
LOS: 1 days | Discharge: HOME | End: 2024-11-21

## 2024-11-21 DIAGNOSIS — C88.4 EXTRANODAL MARGINAL ZONE B-CELL LYMPHOMA OF MUCOSA-ASSOCIATED LYMPHOID TISSUE [MALT-LYMPHOMA]: ICD-10-CM

## 2024-11-26 ENCOUNTER — APPOINTMENT (OUTPATIENT)
Dept: HEMATOLOGY ONCOLOGY | Facility: CLINIC | Age: 84
End: 2024-11-26

## 2024-11-26 VITALS
HEIGHT: 64 IN | DIASTOLIC BLOOD PRESSURE: 70 MMHG | SYSTOLIC BLOOD PRESSURE: 137 MMHG | TEMPERATURE: 97.8 F | HEART RATE: 86 BPM | OXYGEN SATURATION: 96 % | BODY MASS INDEX: 24.15 KG/M2 | WEIGHT: 141.44 LBS

## 2024-11-26 DIAGNOSIS — C50.919 MALIGNANT NEOPLASM OF UNSPECIFIED SITE OF UNSPECIFIED FEMALE BREAST: ICD-10-CM

## 2024-11-26 DIAGNOSIS — Z17.421 MALIGNANT NEOPLASM OF UNSPECIFIED SITE OF UNSPECIFIED FEMALE BREAST: ICD-10-CM

## 2024-11-26 PROCEDURE — 99215 OFFICE O/P EST HI 40 MIN: CPT

## 2024-11-28 ENCOUNTER — TRANSCRIPTION ENCOUNTER (OUTPATIENT)
Age: 84
End: 2024-11-28

## 2024-12-02 ENCOUNTER — TRANSCRIPTION ENCOUNTER (OUTPATIENT)
Age: 84
End: 2024-12-02

## 2024-12-11 ENCOUNTER — TRANSCRIPTION ENCOUNTER (OUTPATIENT)
Age: 84
End: 2024-12-11

## 2024-12-17 PROBLEM — C88.40 MALT LYMPHOMA: Status: ACTIVE | Noted: 2021-08-13

## 2024-12-18 ENCOUNTER — NON-APPOINTMENT (OUTPATIENT)
Age: 84
End: 2024-12-18

## 2024-12-18 ENCOUNTER — APPOINTMENT (OUTPATIENT)
Dept: INTERNAL MEDICINE | Facility: CLINIC | Age: 84
End: 2024-12-18
Payer: MEDICARE

## 2024-12-18 ENCOUNTER — TRANSCRIPTION ENCOUNTER (OUTPATIENT)
Age: 84
End: 2024-12-18

## 2024-12-18 VITALS
SYSTOLIC BLOOD PRESSURE: 161 MMHG | TEMPERATURE: 97.8 F | WEIGHT: 139 LBS | HEART RATE: 60 BPM | HEIGHT: 64 IN | RESPIRATION RATE: 14 BRPM | BODY MASS INDEX: 23.73 KG/M2 | DIASTOLIC BLOOD PRESSURE: 82 MMHG | OXYGEN SATURATION: 97 %

## 2024-12-18 DIAGNOSIS — E78.00 PURE HYPERCHOLESTEROLEMIA, UNSPECIFIED: ICD-10-CM

## 2024-12-18 DIAGNOSIS — E03.9 HYPOTHYROIDISM, UNSPECIFIED: ICD-10-CM

## 2024-12-18 DIAGNOSIS — Z00.00 ENCOUNTER FOR GENERAL ADULT MEDICAL EXAMINATION W/OUT ABNORMAL FINDINGS: ICD-10-CM

## 2024-12-18 DIAGNOSIS — E11.9 TYPE 2 DIABETES MELLITUS W/OUT COMPLICATIONS: ICD-10-CM

## 2024-12-18 DIAGNOSIS — F03.90 UNSPECIFIED DEMENTIA W/OUT BEHAVIORAL DISTURBANCE: ICD-10-CM

## 2024-12-18 DIAGNOSIS — Z23 ENCOUNTER FOR IMMUNIZATION: ICD-10-CM

## 2024-12-18 DIAGNOSIS — I10 ESSENTIAL (PRIMARY) HYPERTENSION: ICD-10-CM

## 2024-12-18 PROCEDURE — 93000 ELECTROCARDIOGRAM COMPLETE: CPT

## 2024-12-18 PROCEDURE — G0439: CPT

## 2024-12-18 PROCEDURE — 36415 COLL VENOUS BLD VENIPUNCTURE: CPT

## 2024-12-19 DIAGNOSIS — C50.919 MALIGNANT NEOPLASM OF UNSPECIFIED SITE OF UNSPECIFIED FEMALE BREAST: ICD-10-CM

## 2024-12-20 ENCOUNTER — APPOINTMENT (OUTPATIENT)
Dept: NUCLEAR MEDICINE | Facility: CLINIC | Age: 84
End: 2024-12-20

## 2024-12-20 ENCOUNTER — OUTPATIENT (OUTPATIENT)
Dept: OUTPATIENT SERVICES | Facility: HOSPITAL | Age: 84
LOS: 1 days | End: 2024-12-20
Payer: MEDICARE

## 2024-12-20 PROCEDURE — 78815 PET IMAGE W/CT SKULL-THIGH: CPT

## 2024-12-20 PROCEDURE — 78815 PET IMAGE W/CT SKULL-THIGH: CPT | Mod: 26,PI,MH

## 2024-12-20 PROCEDURE — A9552: CPT

## 2024-12-23 ENCOUNTER — TRANSCRIPTION ENCOUNTER (OUTPATIENT)
Age: 84
End: 2024-12-23

## 2024-12-23 LAB
25(OH)D3 SERPL-MCNC: 35.8 NG/ML
ALBUMIN SERPL ELPH-MCNC: 4.2 G/DL
ALP BLD-CCNC: 99 U/L
ALT SERPL-CCNC: 11 U/L
ANION GAP SERPL CALC-SCNC: 16 MMOL/L
AST SERPL-CCNC: 17 U/L
BASOPHILS # BLD AUTO: 0.02 K/UL
BASOPHILS NFR BLD AUTO: 0.3 %
BILIRUB SERPL-MCNC: 0.6 MG/DL
BUN SERPL-MCNC: 21 MG/DL
CALCIUM SERPL-MCNC: 10.1 MG/DL
CHLORIDE SERPL-SCNC: 102 MMOL/L
CHOLEST SERPL-MCNC: 162 MG/DL
CO2 SERPL-SCNC: 25 MMOL/L
CREAT SERPL-MCNC: 0.72 MG/DL
EGFR: 81 ML/MIN/1.73M2
EOSINOPHIL # BLD AUTO: 0.16 K/UL
EOSINOPHIL NFR BLD AUTO: 2.1 %
ESTIMATED AVERAGE GLUCOSE: 128 MG/DL
GLUCOSE SERPL-MCNC: 122 MG/DL
HBA1C MFR BLD HPLC: 6.1 %
HCT VFR BLD CALC: 41.4 %
HDLC SERPL-MCNC: 50 MG/DL
HGB BLD-MCNC: 13.3 G/DL
IMM GRANULOCYTES NFR BLD AUTO: 0.4 %
LDLC SERPL CALC-MCNC: 90 MG/DL
LYMPHOCYTES # BLD AUTO: 2.42 K/UL
LYMPHOCYTES NFR BLD AUTO: 32.2 %
MAN DIFF?: NORMAL
MCHC RBC-ENTMCNC: 29.4 PG
MCHC RBC-ENTMCNC: 32.1 G/DL
MCV RBC AUTO: 91.6 FL
MONOCYTES # BLD AUTO: 0.62 K/UL
MONOCYTES NFR BLD AUTO: 8.2 %
NEUTROPHILS # BLD AUTO: 4.27 K/UL
NEUTROPHILS NFR BLD AUTO: 56.8 %
NONHDLC SERPL-MCNC: 111 MG/DL
PLATELET # BLD AUTO: 241 K/UL
POTASSIUM SERPL-SCNC: 4.5 MMOL/L
PROT SERPL-MCNC: 6.6 G/DL
RBC # BLD: 4.52 M/UL
RBC # FLD: 13.4 %
SODIUM SERPL-SCNC: 143 MMOL/L
TRIGL SERPL-MCNC: 121 MG/DL
TSH SERPL-ACNC: 1.11 UIU/ML
VIT B12 SERPL-MCNC: 804 PG/ML
WBC # FLD AUTO: 7.52 K/UL

## 2024-12-27 ENCOUNTER — APPOINTMENT (OUTPATIENT)
Dept: RADIATION ONCOLOGY | Facility: CLINIC | Age: 84
End: 2024-12-27
Payer: MEDICARE

## 2024-12-27 VITALS
BODY MASS INDEX: 23.73 KG/M2 | SYSTOLIC BLOOD PRESSURE: 126 MMHG | DIASTOLIC BLOOD PRESSURE: 74 MMHG | OXYGEN SATURATION: 96 % | WEIGHT: 139 LBS | RESPIRATION RATE: 16 BRPM | HEIGHT: 64 IN | HEART RATE: 61 BPM

## 2024-12-27 PROCEDURE — 99205 OFFICE O/P NEW HI 60 MIN: CPT

## 2024-12-27 PROCEDURE — G2211 COMPLEX E/M VISIT ADD ON: CPT

## 2024-12-30 ENCOUNTER — TRANSCRIPTION ENCOUNTER (OUTPATIENT)
Age: 84
End: 2024-12-30

## 2024-12-30 PROCEDURE — 77290 THER RAD SIMULAJ FIELD CPLX: CPT

## 2024-12-30 PROCEDURE — 77333 RADIATION TREATMENT AID(S): CPT

## 2024-12-30 PROCEDURE — 77263 THER RADIOLOGY TX PLNG CPLX: CPT

## 2024-12-31 ENCOUNTER — NON-APPOINTMENT (OUTPATIENT)
Age: 84
End: 2024-12-31

## 2025-01-02 ENCOUNTER — NON-APPOINTMENT (OUTPATIENT)
Age: 85
End: 2025-01-02

## 2025-01-02 ENCOUNTER — TRANSCRIPTION ENCOUNTER (OUTPATIENT)
Age: 85
End: 2025-01-02

## 2025-01-03 ENCOUNTER — APPOINTMENT (OUTPATIENT)
Dept: UROLOGY | Facility: CLINIC | Age: 85
End: 2025-01-03
Payer: MEDICARE

## 2025-01-03 VITALS
DIASTOLIC BLOOD PRESSURE: 72 MMHG | SYSTOLIC BLOOD PRESSURE: 115 MMHG | TEMPERATURE: 97.8 F | WEIGHT: 139 LBS | BODY MASS INDEX: 23.73 KG/M2 | OXYGEN SATURATION: 98 % | RESPIRATION RATE: 14 BRPM | HEIGHT: 64 IN | HEART RATE: 90 BPM

## 2025-01-03 DIAGNOSIS — N20.1 CALCULUS OF URETER: ICD-10-CM

## 2025-01-03 PROCEDURE — 99213 OFFICE O/P EST LOW 20 MIN: CPT

## 2025-01-06 ENCOUNTER — RX RENEWAL (OUTPATIENT)
Age: 85
End: 2025-01-06

## 2025-01-06 PROCEDURE — 77300 RADIATION THERAPY DOSE PLAN: CPT

## 2025-01-06 PROCEDURE — 77334 RADIATION TREATMENT AID(S): CPT

## 2025-01-06 PROCEDURE — 77295 3-D RADIOTHERAPY PLAN: CPT

## 2025-01-07 ENCOUNTER — RX RENEWAL (OUTPATIENT)
Age: 85
End: 2025-01-07

## 2025-01-10 PROCEDURE — 77280 THER RAD SIMULAJ FIELD SMPL: CPT

## 2025-01-13 PROCEDURE — 77427 RADIATION TX MANAGEMENT X5: CPT

## 2025-01-13 PROCEDURE — G6012: CPT

## 2025-01-14 ENCOUNTER — TRANSCRIPTION ENCOUNTER (OUTPATIENT)
Age: 85
End: 2025-01-14

## 2025-01-14 ENCOUNTER — NON-APPOINTMENT (OUTPATIENT)
Age: 85
End: 2025-01-14

## 2025-01-14 ENCOUNTER — APPOINTMENT (OUTPATIENT)
Dept: INTERNAL MEDICINE | Facility: CLINIC | Age: 85
End: 2025-01-14
Payer: MEDICARE

## 2025-01-14 VITALS
OXYGEN SATURATION: 96 % | WEIGHT: 138 LBS | HEIGHT: 64 IN | HEART RATE: 68 BPM | RESPIRATION RATE: 16 BRPM | BODY MASS INDEX: 23.56 KG/M2

## 2025-01-14 VITALS
BODY MASS INDEX: 23.9 KG/M2 | WEIGHT: 140 LBS | HEART RATE: 61 BPM | HEIGHT: 64 IN | OXYGEN SATURATION: 97 % | RESPIRATION RATE: 15 BRPM | SYSTOLIC BLOOD PRESSURE: 110 MMHG | DIASTOLIC BLOOD PRESSURE: 60 MMHG | TEMPERATURE: 98 F

## 2025-01-14 DIAGNOSIS — J20.9 ACUTE BRONCHITIS, UNSPECIFIED: ICD-10-CM

## 2025-01-14 PROCEDURE — 99213 OFFICE O/P EST LOW 20 MIN: CPT

## 2025-01-14 PROCEDURE — G6012: CPT

## 2025-01-15 PROCEDURE — G6012: CPT

## 2025-01-16 PROCEDURE — G6012: CPT

## 2025-01-17 PROCEDURE — G6012: CPT

## 2025-01-17 PROCEDURE — 77417 THER RADIOLOGY PORT IMAGE(S): CPT

## 2025-01-17 PROCEDURE — 77336 RADIATION PHYSICS CONSULT: CPT

## 2025-01-18 ENCOUNTER — APPOINTMENT (OUTPATIENT)
Dept: RADIOLOGY | Facility: CLINIC | Age: 85
End: 2025-01-18
Payer: MEDICARE

## 2025-01-18 ENCOUNTER — OUTPATIENT (OUTPATIENT)
Dept: OUTPATIENT SERVICES | Facility: HOSPITAL | Age: 85
LOS: 1 days | End: 2025-01-18
Payer: MEDICARE

## 2025-01-18 DIAGNOSIS — N20.1 CALCULUS OF URETER: ICD-10-CM

## 2025-01-18 PROCEDURE — 74018 RADEX ABDOMEN 1 VIEW: CPT | Mod: 26

## 2025-01-18 PROCEDURE — 71046 X-RAY EXAM CHEST 2 VIEWS: CPT | Mod: 26

## 2025-01-18 PROCEDURE — 74018 RADEX ABDOMEN 1 VIEW: CPT

## 2025-01-18 PROCEDURE — 71046 X-RAY EXAM CHEST 2 VIEWS: CPT

## 2025-01-21 PROCEDURE — 77427 RADIATION TX MANAGEMENT X5: CPT

## 2025-01-21 PROCEDURE — G6012: CPT

## 2025-01-22 DIAGNOSIS — N20.1 CALCULUS OF URETER: ICD-10-CM

## 2025-01-22 PROCEDURE — G6012: CPT

## 2025-01-22 RX ORDER — ALLOPURINOL 100 MG/1
100 TABLET ORAL
Qty: 21 | Refills: 0 | Status: ACTIVE | COMMUNITY
Start: 2025-01-22 | End: 1900-01-01

## 2025-01-23 ENCOUNTER — NON-APPOINTMENT (OUTPATIENT)
Age: 85
End: 2025-01-23

## 2025-01-23 VITALS
HEART RATE: 67 BPM | RESPIRATION RATE: 16 BRPM | OXYGEN SATURATION: 96 % | SYSTOLIC BLOOD PRESSURE: 123 MMHG | DIASTOLIC BLOOD PRESSURE: 75 MMHG

## 2025-01-23 PROCEDURE — G6012: CPT

## 2025-01-24 PROCEDURE — G6012: CPT

## 2025-01-24 RX ORDER — POTASSIUM CITRATE, TRISODIUM CITRATE DIHYDRATE AND CITRIC ACID MONOHYDRATE 550; 500; 334 MG/5ML; MG/5ML; MG/5ML
550-500-334 SOLUTION ORAL
Qty: 3 | Refills: 0 | Status: ACTIVE | COMMUNITY
Start: 2025-01-22 | End: 1900-01-01

## 2025-01-27 ENCOUNTER — RX RENEWAL (OUTPATIENT)
Age: 85
End: 2025-01-27

## 2025-01-27 PROCEDURE — 77336 RADIATION PHYSICS CONSULT: CPT

## 2025-01-27 PROCEDURE — 77417 THER RADIOLOGY PORT IMAGE(S): CPT

## 2025-01-27 PROCEDURE — G6012: CPT

## 2025-01-28 PROCEDURE — 77427 RADIATION TX MANAGEMENT X5: CPT

## 2025-01-28 PROCEDURE — G6012: CPT

## 2025-01-29 PROCEDURE — G6012: CPT

## 2025-01-30 ENCOUNTER — NON-APPOINTMENT (OUTPATIENT)
Age: 85
End: 2025-01-30

## 2025-01-30 DIAGNOSIS — R21 RASH AND OTHER NONSPECIFIC SKIN ERUPTION: ICD-10-CM

## 2025-01-30 PROCEDURE — G6012: CPT

## 2025-01-30 RX ORDER — NYSTATIN 100000 [USP'U]/G
100000 POWDER TOPICAL
Qty: 1 | Refills: 3 | Status: ACTIVE | COMMUNITY
Start: 2025-01-30 | End: 1900-01-01

## 2025-01-31 ENCOUNTER — TRANSCRIPTION ENCOUNTER (OUTPATIENT)
Age: 85
End: 2025-01-31

## 2025-01-31 PROCEDURE — G6012: CPT

## 2025-01-31 PROCEDURE — 77427 RADIATION TX MANAGEMENT X5: CPT

## 2025-02-03 PROCEDURE — 77336 RADIATION PHYSICS CONSULT: CPT

## 2025-02-03 PROCEDURE — G6012: CPT

## 2025-02-11 DIAGNOSIS — L58.9 RADIODERMATITIS, UNSPECIFIED: ICD-10-CM

## 2025-02-11 RX ORDER — SILVER SULFADIAZINE 10 MG/G
1 CREAM TOPICAL TWICE DAILY
Qty: 1 | Refills: 0 | Status: ACTIVE | COMMUNITY
Start: 2025-02-11 | End: 1900-01-01

## 2025-02-24 ENCOUNTER — TRANSCRIPTION ENCOUNTER (OUTPATIENT)
Age: 85
End: 2025-02-24

## 2025-03-03 ENCOUNTER — OUTPATIENT (OUTPATIENT)
Dept: OUTPATIENT SERVICES | Facility: HOSPITAL | Age: 85
LOS: 1 days | End: 2025-03-03
Payer: MEDICARE

## 2025-03-03 ENCOUNTER — APPOINTMENT (OUTPATIENT)
Dept: ULTRASOUND IMAGING | Facility: CLINIC | Age: 85
End: 2025-03-03
Payer: MEDICARE

## 2025-03-03 DIAGNOSIS — N20.1 CALCULUS OF URETER: ICD-10-CM

## 2025-03-03 PROCEDURE — 76770 US EXAM ABDO BACK WALL COMP: CPT

## 2025-03-03 PROCEDURE — 76770 US EXAM ABDO BACK WALL COMP: CPT | Mod: 26

## 2025-03-05 ENCOUNTER — NON-APPOINTMENT (OUTPATIENT)
Age: 85
End: 2025-03-05

## 2025-03-05 ENCOUNTER — APPOINTMENT (OUTPATIENT)
Dept: RADIATION ONCOLOGY | Facility: CLINIC | Age: 85
End: 2025-03-05
Payer: MEDICARE

## 2025-03-05 PROCEDURE — 99024 POSTOP FOLLOW-UP VISIT: CPT

## 2025-03-07 DIAGNOSIS — N20.1 CALCULUS OF URETER: ICD-10-CM

## 2025-03-18 ENCOUNTER — OUTPATIENT (OUTPATIENT)
Dept: OUTPATIENT SERVICES | Facility: HOSPITAL | Age: 85
LOS: 1 days | Discharge: HOME | End: 2025-03-18

## 2025-03-18 DIAGNOSIS — C88.40 EXTRANODAL MARGINAL ZONE B-CELL LYMPHOMA OF MUCOSA-ASSOCIATED LYMPHOID TISSUE [MALT-LYMPHOMA] NOT HAVING ACHIEVED REMISSION: ICD-10-CM

## 2025-03-23 PROBLEM — R91.1 LUNG NODULE: Status: RESOLVED | Noted: 2021-07-08 | Resolved: 2025-03-23

## 2025-03-23 PROBLEM — U07.1 COVID-19 VIRUS INFECTION: Status: RESOLVED | Noted: 2022-01-05 | Resolved: 2025-03-23

## 2025-03-23 PROBLEM — N63.11 MASS OF UPPER OUTER QUADRANT OF RIGHT BREAST: Status: RESOLVED | Noted: 2024-10-23 | Resolved: 2025-03-23

## 2025-03-23 PROBLEM — R92.8 ABNORMAL MAMMOGRAM OF RIGHT BREAST: Status: RESOLVED | Noted: 2024-10-28 | Resolved: 2025-03-23

## 2025-03-23 PROBLEM — R59.0 LEFT CERVICAL LYMPHADENOPATHY: Status: RESOLVED | Noted: 2022-11-17 | Resolved: 2025-03-23

## 2025-03-23 PROBLEM — R39.9 UTI SYMPTOMS: Status: RESOLVED | Noted: 2023-11-17 | Resolved: 2025-03-23

## 2025-03-23 PROBLEM — R92.30 DENSE BREAST TISSUE ON MAMMOGRAM: Status: RESOLVED | Noted: 2021-08-05 | Resolved: 2025-03-23

## 2025-03-23 PROBLEM — Z13.31 DEPRESSION SCREENING: Status: RESOLVED | Noted: 2022-10-19 | Resolved: 2025-03-23

## 2025-03-23 PROBLEM — Z92.89 HISTORY OF SCREENING MAMMOGRAPHY: Status: RESOLVED | Noted: 2022-08-08 | Resolved: 2025-03-23

## 2025-03-24 ENCOUNTER — TRANSCRIPTION ENCOUNTER (OUTPATIENT)
Age: 85
End: 2025-03-24

## 2025-03-24 ENCOUNTER — APPOINTMENT (OUTPATIENT)
Dept: HEMATOLOGY ONCOLOGY | Facility: CLINIC | Age: 85
End: 2025-03-24

## 2025-03-24 ENCOUNTER — NON-APPOINTMENT (OUTPATIENT)
Age: 85
End: 2025-03-24

## 2025-03-24 VITALS
WEIGHT: 137 LBS | SYSTOLIC BLOOD PRESSURE: 124 MMHG | DIASTOLIC BLOOD PRESSURE: 80 MMHG | BODY MASS INDEX: 23.39 KG/M2 | HEART RATE: 83 BPM | OXYGEN SATURATION: 97 % | HEIGHT: 64 IN | TEMPERATURE: 97.7 F

## 2025-03-24 DIAGNOSIS — R59.0 LOCALIZED ENLARGED LYMPH NODES: ICD-10-CM

## 2025-03-24 DIAGNOSIS — N63.11 UNSPECIFIED LUMP IN THE RIGHT BREAST, UPPER OUTER QUADRANT: ICD-10-CM

## 2025-03-24 DIAGNOSIS — R92.30 DENSE BREASTS, UNSPECIFIED: ICD-10-CM

## 2025-03-24 DIAGNOSIS — R92.8 OTHER ABNORMAL AND INCONCLUSIVE FINDINGS ON DIAGNOSTIC IMAGING OF BREAST: ICD-10-CM

## 2025-03-24 DIAGNOSIS — R91.1 SOLITARY PULMONARY NODULE: ICD-10-CM

## 2025-03-24 DIAGNOSIS — Z92.89 PERSONAL HISTORY OF OTHER MEDICAL TREATMENT: ICD-10-CM

## 2025-03-24 PROCEDURE — 99214 OFFICE O/P EST MOD 30 MIN: CPT

## 2025-03-31 ENCOUNTER — RX RENEWAL (OUTPATIENT)
Age: 85
End: 2025-03-31

## 2025-04-03 ENCOUNTER — APPOINTMENT (OUTPATIENT)
Dept: UROLOGY | Facility: CLINIC | Age: 85
End: 2025-04-03
Payer: MEDICARE

## 2025-04-03 VITALS
HEART RATE: 110 BPM | SYSTOLIC BLOOD PRESSURE: 115 MMHG | WEIGHT: 137 LBS | OXYGEN SATURATION: 98 % | RESPIRATION RATE: 16 BRPM | DIASTOLIC BLOOD PRESSURE: 88 MMHG | HEIGHT: 64 IN | BODY MASS INDEX: 23.39 KG/M2

## 2025-04-03 DIAGNOSIS — N20.1 CALCULUS OF URETER: ICD-10-CM

## 2025-04-03 DIAGNOSIS — N32.89 OTHER SPECIFIED DISORDERS OF BLADDER: ICD-10-CM

## 2025-04-03 PROCEDURE — 99213 OFFICE O/P EST LOW 20 MIN: CPT

## 2025-04-07 ENCOUNTER — RX RENEWAL (OUTPATIENT)
Age: 85
End: 2025-04-07

## 2025-04-11 ENCOUNTER — APPOINTMENT (OUTPATIENT)
Dept: INTERNAL MEDICINE | Facility: CLINIC | Age: 85
End: 2025-04-11
Payer: MEDICARE

## 2025-04-11 VITALS
SYSTOLIC BLOOD PRESSURE: 115 MMHG | TEMPERATURE: 97.8 F | OXYGEN SATURATION: 100 % | RESPIRATION RATE: 16 BRPM | HEIGHT: 61 IN | DIASTOLIC BLOOD PRESSURE: 76 MMHG | HEART RATE: 82 BPM

## 2025-04-11 DIAGNOSIS — I10 ESSENTIAL (PRIMARY) HYPERTENSION: ICD-10-CM

## 2025-04-11 DIAGNOSIS — R53.82 CHRONIC FATIGUE, UNSPECIFIED: ICD-10-CM

## 2025-04-11 DIAGNOSIS — C50.919 MALIGNANT NEOPLASM OF UNSPECIFIED SITE OF UNSPECIFIED FEMALE BREAST: ICD-10-CM

## 2025-04-11 DIAGNOSIS — M25.50 PAIN IN UNSPECIFIED JOINT: ICD-10-CM

## 2025-04-11 DIAGNOSIS — Z17.421 MALIGNANT NEOPLASM OF UNSPECIFIED SITE OF UNSPECIFIED FEMALE BREAST: ICD-10-CM

## 2025-04-11 DIAGNOSIS — E03.9 HYPOTHYROIDISM, UNSPECIFIED: ICD-10-CM

## 2025-04-11 DIAGNOSIS — E11.9 TYPE 2 DIABETES MELLITUS W/OUT COMPLICATIONS: ICD-10-CM

## 2025-04-11 DIAGNOSIS — F03.90 UNSPECIFIED DEMENTIA W/OUT BEHAVIORAL DISTURBANCE: ICD-10-CM

## 2025-04-11 PROCEDURE — 99214 OFFICE O/P EST MOD 30 MIN: CPT

## 2025-04-11 PROCEDURE — G2211 COMPLEX E/M VISIT ADD ON: CPT

## 2025-04-11 PROCEDURE — 36415 COLL VENOUS BLD VENIPUNCTURE: CPT

## 2025-04-11 RX ORDER — ACETAMINOPHEN 160 MG/5ML
160 LIQUID ORAL
Qty: 1 | Refills: 0 | Status: ACTIVE | COMMUNITY
Start: 2025-04-11 | End: 1900-01-01

## 2025-04-14 LAB
ALBUMIN SERPL ELPH-MCNC: 3.6 G/DL
ALP BLD-CCNC: 169 U/L
ALT SERPL-CCNC: 22 U/L
ANION GAP SERPL CALC-SCNC: 15 MMOL/L
AST SERPL-CCNC: 99 U/L
BASOPHILS # BLD AUTO: 0.02 K/UL
BASOPHILS NFR BLD AUTO: 0.2 %
BILIRUB SERPL-MCNC: 0.5 MG/DL
BUN SERPL-MCNC: 24 MG/DL
CALCIUM SERPL-MCNC: 10.5 MG/DL
CHLORIDE SERPL-SCNC: 97 MMOL/L
CO2 SERPL-SCNC: 25 MMOL/L
CREAT SERPL-MCNC: 0.72 MG/DL
EGFRCR SERPLBLD CKD-EPI 2021: 81 ML/MIN/1.73M2
EOSINOPHIL # BLD AUTO: 0.02 K/UL
EOSINOPHIL NFR BLD AUTO: 0.2 %
ESTIMATED AVERAGE GLUCOSE: 154 MG/DL
GLUCOSE SERPL-MCNC: 287 MG/DL
HBA1C MFR BLD HPLC: 7 %
HCT VFR BLD CALC: 38.3 %
HGB BLD-MCNC: 12.4 G/DL
IMM GRANULOCYTES NFR BLD AUTO: 0.9 %
LYMPHOCYTES # BLD AUTO: 0.97 K/UL
LYMPHOCYTES NFR BLD AUTO: 10.6 %
MAN DIFF?: NORMAL
MCHC RBC-ENTMCNC: 28.1 PG
MCHC RBC-ENTMCNC: 32.4 G/DL
MCV RBC AUTO: 86.7 FL
MONOCYTES # BLD AUTO: 0.58 K/UL
MONOCYTES NFR BLD AUTO: 6.3 %
NEUTROPHILS # BLD AUTO: 7.5 K/UL
NEUTROPHILS NFR BLD AUTO: 81.8 %
PLATELET # BLD AUTO: 270 K/UL
POTASSIUM SERPL-SCNC: 4.6 MMOL/L
PROT SERPL-MCNC: 6.2 G/DL
RBC # BLD: 4.42 M/UL
RBC # FLD: 14.1 %
SODIUM SERPL-SCNC: 137 MMOL/L
TSH SERPL-ACNC: 1.9 UIU/ML
WBC # FLD AUTO: 9.17 K/UL

## 2025-04-16 RX ORDER — CIPROFLOXACIN HCL 250 MG
1 TABLET ORAL
Refills: 0 | DISCHARGE
Start: 2025-04-16

## 2025-04-16 RX ORDER — CIPROFLOXACIN HYDROCHLORIDE 500 MG/1
500 TABLET, FILM COATED ORAL
Qty: 14 | Refills: 0 | Status: ACTIVE | COMMUNITY
Start: 2025-04-16 | End: 1900-01-01

## 2025-04-19 ENCOUNTER — INPATIENT (INPATIENT)
Facility: HOSPITAL | Age: 85
LOS: 5 days | Discharge: HOSPICE | DRG: 694 | End: 2025-04-25
Attending: FAMILY MEDICINE | Admitting: STUDENT IN AN ORGANIZED HEALTH CARE EDUCATION/TRAINING PROGRAM
Payer: MEDICARE

## 2025-04-19 VITALS
SYSTOLIC BLOOD PRESSURE: 151 MMHG | OXYGEN SATURATION: 97 % | DIASTOLIC BLOOD PRESSURE: 85 MMHG | HEIGHT: 66 IN | HEART RATE: 85 BPM | RESPIRATION RATE: 18 BRPM | WEIGHT: 139.99 LBS

## 2025-04-19 DIAGNOSIS — C50.919 MALIGNANT NEOPLASM OF UNSPECIFIED SITE OF UNSPECIFIED FEMALE BREAST: ICD-10-CM

## 2025-04-19 DIAGNOSIS — N39.0 URINARY TRACT INFECTION, SITE NOT SPECIFIED: ICD-10-CM

## 2025-04-19 DIAGNOSIS — E03.9 HYPOTHYROIDISM, UNSPECIFIED: ICD-10-CM

## 2025-04-19 DIAGNOSIS — E11.9 TYPE 2 DIABETES MELLITUS WITHOUT COMPLICATIONS: ICD-10-CM

## 2025-04-19 DIAGNOSIS — I10 ESSENTIAL (PRIMARY) HYPERTENSION: ICD-10-CM

## 2025-04-19 DIAGNOSIS — E78.5 HYPERLIPIDEMIA, UNSPECIFIED: ICD-10-CM

## 2025-04-19 DIAGNOSIS — Z29.9 ENCOUNTER FOR PROPHYLACTIC MEASURES, UNSPECIFIED: ICD-10-CM

## 2025-04-19 DIAGNOSIS — F03.90 UNSPECIFIED DEMENTIA, UNSPECIFIED SEVERITY, WITHOUT BEHAVIORAL DISTURBANCE, PSYCHOTIC DISTURBANCE, MOOD DISTURBANCE, AND ANXIETY: ICD-10-CM

## 2025-04-19 DIAGNOSIS — N20.0 CALCULUS OF KIDNEY: ICD-10-CM

## 2025-04-19 LAB
ALBUMIN SERPL ELPH-MCNC: 2.5 G/DL — LOW (ref 3.3–5)
ALP SERPL-CCNC: 172 U/L — HIGH (ref 40–120)
ALT FLD-CCNC: 33 U/L — SIGNIFICANT CHANGE UP (ref 12–78)
ANION GAP SERPL CALC-SCNC: 5 MMOL/L — SIGNIFICANT CHANGE UP (ref 5–17)
APPEARANCE UR: ABNORMAL
APTT BLD: 34.7 SEC — SIGNIFICANT CHANGE UP (ref 24.5–35.6)
AST SERPL-CCNC: 100 U/L — HIGH (ref 15–37)
BACTERIA # UR AUTO: ABNORMAL /HPF
BASOPHILS # BLD AUTO: 0.04 K/UL — SIGNIFICANT CHANGE UP (ref 0–0.2)
BASOPHILS NFR BLD AUTO: 0.4 % — SIGNIFICANT CHANGE UP (ref 0–2)
BILIRUB SERPL-MCNC: 0.7 MG/DL — SIGNIFICANT CHANGE UP (ref 0.2–1.2)
BILIRUB UR-MCNC: NEGATIVE — SIGNIFICANT CHANGE UP
BUN SERPL-MCNC: 31 MG/DL — HIGH (ref 7–23)
CALCIUM SERPL-MCNC: 11.2 MG/DL — HIGH (ref 8.5–10.1)
CAST: 1 /LPF — SIGNIFICANT CHANGE UP (ref 0–4)
CHLORIDE SERPL-SCNC: 106 MMOL/L — SIGNIFICANT CHANGE UP (ref 96–108)
CO2 SERPL-SCNC: 27 MMOL/L — SIGNIFICANT CHANGE UP (ref 22–31)
COD CRY URNS QL: PRESENT
COLOR SPEC: YELLOW — SIGNIFICANT CHANGE UP
COMMENT - URINE: SIGNIFICANT CHANGE UP
CREAT SERPL-MCNC: 0.7 MG/DL — SIGNIFICANT CHANGE UP (ref 0.5–1.3)
DIFF PNL FLD: ABNORMAL
EGFR: 84 ML/MIN/1.73M2 — SIGNIFICANT CHANGE UP
EGFR: 84 ML/MIN/1.73M2 — SIGNIFICANT CHANGE UP
EOSINOPHIL # BLD AUTO: 0.09 K/UL — SIGNIFICANT CHANGE UP (ref 0–0.5)
EOSINOPHIL NFR BLD AUTO: 0.9 % — SIGNIFICANT CHANGE UP (ref 0–6)
FLUAV AG NPH QL: SIGNIFICANT CHANGE UP
FLUBV AG NPH QL: SIGNIFICANT CHANGE UP
GLUCOSE BLDC GLUCOMTR-MCNC: 225 MG/DL — HIGH (ref 70–99)
GLUCOSE SERPL-MCNC: 234 MG/DL — HIGH (ref 70–99)
GLUCOSE UR QL: 100 MG/DL
HCT VFR BLD CALC: 37.9 % — SIGNIFICANT CHANGE UP (ref 34.5–45)
HGB BLD-MCNC: 12.4 G/DL — SIGNIFICANT CHANGE UP (ref 11.5–15.5)
HYALINE CASTS # UR AUTO: SIGNIFICANT CHANGE UP
IMM GRANULOCYTES # BLD AUTO: 0.16 K/UL — HIGH (ref 0–0.07)
IMM GRANULOCYTES NFR BLD AUTO: 1.7 % — HIGH (ref 0–0.9)
INR BLD: 1.2 RATIO — HIGH (ref 0.85–1.16)
KETONES UR-MCNC: ABNORMAL MG/DL
LACTATE SERPL-SCNC: 1.8 MMOL/L — SIGNIFICANT CHANGE UP (ref 0.7–2)
LEUKOCYTE ESTERASE UR-ACNC: ABNORMAL
LYMPHOCYTES # BLD AUTO: 1.85 K/UL — SIGNIFICANT CHANGE UP (ref 1–3.3)
LYMPHOCYTES NFR BLD AUTO: 19.4 % — SIGNIFICANT CHANGE UP (ref 13–44)
MCHC RBC-ENTMCNC: 27.6 PG — SIGNIFICANT CHANGE UP (ref 27–34)
MCHC RBC-ENTMCNC: 32.7 G/DL — SIGNIFICANT CHANGE UP (ref 32–36)
MCV RBC AUTO: 84.4 FL — SIGNIFICANT CHANGE UP (ref 80–100)
MONOCYTES # BLD AUTO: 0.75 K/UL — SIGNIFICANT CHANGE UP (ref 0–0.9)
MONOCYTES NFR BLD AUTO: 7.9 % — SIGNIFICANT CHANGE UP (ref 2–14)
NEUTROPHILS # BLD AUTO: 6.64 K/UL — SIGNIFICANT CHANGE UP (ref 1.8–7.4)
NEUTROPHILS NFR BLD AUTO: 69.7 % — SIGNIFICANT CHANGE UP (ref 43–77)
NITRITE UR-MCNC: NEGATIVE — SIGNIFICANT CHANGE UP
NRBC # BLD AUTO: 0 K/UL — SIGNIFICANT CHANGE UP (ref 0–0)
NRBC # FLD: 0 K/UL — SIGNIFICANT CHANGE UP (ref 0–0)
NRBC BLD AUTO-RTO: 0 /100 WBCS — SIGNIFICANT CHANGE UP (ref 0–0)
PH UR: 5.5 — SIGNIFICANT CHANGE UP (ref 5–8)
PLATELET # BLD AUTO: 250 K/UL — SIGNIFICANT CHANGE UP (ref 150–400)
PMV BLD: 8.8 FL — SIGNIFICANT CHANGE UP (ref 7–13)
POTASSIUM SERPL-MCNC: 4.1 MMOL/L — SIGNIFICANT CHANGE UP (ref 3.5–5.3)
POTASSIUM SERPL-SCNC: 4.1 MMOL/L — SIGNIFICANT CHANGE UP (ref 3.5–5.3)
PROT SERPL-MCNC: 6.2 GM/DL — SIGNIFICANT CHANGE UP (ref 6–8.3)
PROT UR-MCNC: 100 MG/DL
PROTHROM AB SERPL-ACNC: 14.1 SEC — HIGH (ref 9.9–13.4)
RBC # BLD: 4.49 M/UL — SIGNIFICANT CHANGE UP (ref 3.8–5.2)
RBC # FLD: 14.3 % — SIGNIFICANT CHANGE UP (ref 10.3–14.5)
RBC CASTS # UR COMP ASSIST: 1 /HPF — SIGNIFICANT CHANGE UP (ref 0–4)
RSV RNA NPH QL NAA+NON-PROBE: SIGNIFICANT CHANGE UP
SARS-COV-2 RNA SPEC QL NAA+PROBE: SIGNIFICANT CHANGE UP
SODIUM SERPL-SCNC: 138 MMOL/L — SIGNIFICANT CHANGE UP (ref 135–145)
SOURCE RESPIRATORY: SIGNIFICANT CHANGE UP
SP GR SPEC: 1.02 — SIGNIFICANT CHANGE UP (ref 1–1.03)
SQUAMOUS # UR AUTO: 2 /HPF — SIGNIFICANT CHANGE UP (ref 0–5)
TROPONIN I, HIGH SENSITIVITY RESULT: 22.55 NG/L — SIGNIFICANT CHANGE UP
UROBILINOGEN FLD QL: 1 MG/DL — SIGNIFICANT CHANGE UP (ref 0.2–1)
WBC # BLD: 9.53 K/UL — SIGNIFICANT CHANGE UP (ref 3.8–10.5)
WBC # FLD AUTO: 9.53 K/UL — SIGNIFICANT CHANGE UP (ref 3.8–10.5)
WBC UR QL: 10 /HPF — HIGH (ref 0–5)

## 2025-04-19 PROCEDURE — 84439 ASSAY OF FREE THYROXINE: CPT

## 2025-04-19 PROCEDURE — 82550 ASSAY OF CK (CPK): CPT

## 2025-04-19 PROCEDURE — 82306 VITAMIN D 25 HYDROXY: CPT

## 2025-04-19 PROCEDURE — 97163 PT EVAL HIGH COMPLEX 45 MIN: CPT | Mod: GP

## 2025-04-19 PROCEDURE — 84443 ASSAY THYROID STIM HORMONE: CPT

## 2025-04-19 PROCEDURE — 93010 ELECTROCARDIOGRAM REPORT: CPT

## 2025-04-19 PROCEDURE — 99222 1ST HOSP IP/OBS MODERATE 55: CPT

## 2025-04-19 PROCEDURE — 83540 ASSAY OF IRON: CPT

## 2025-04-19 PROCEDURE — 83880 ASSAY OF NATRIURETIC PEPTIDE: CPT

## 2025-04-19 PROCEDURE — 84550 ASSAY OF BLOOD/URIC ACID: CPT

## 2025-04-19 PROCEDURE — 82962 GLUCOSE BLOOD TEST: CPT

## 2025-04-19 PROCEDURE — 80061 LIPID PANEL: CPT

## 2025-04-19 PROCEDURE — 80053 COMPREHEN METABOLIC PANEL: CPT

## 2025-04-19 PROCEDURE — 80048 BASIC METABOLIC PNL TOTAL CA: CPT

## 2025-04-19 PROCEDURE — 97116 GAIT TRAINING THERAPY: CPT | Mod: GP

## 2025-04-19 PROCEDURE — 95816 EEG AWAKE AND DROWSY: CPT

## 2025-04-19 PROCEDURE — 83735 ASSAY OF MAGNESIUM: CPT

## 2025-04-19 PROCEDURE — 74177 CT ABD & PELVIS W/CONTRAST: CPT | Mod: 26

## 2025-04-19 PROCEDURE — 97530 THERAPEUTIC ACTIVITIES: CPT | Mod: GP

## 2025-04-19 PROCEDURE — 99285 EMERGENCY DEPT VISIT HI MDM: CPT | Mod: FS

## 2025-04-19 PROCEDURE — 85027 COMPLETE CBC AUTOMATED: CPT

## 2025-04-19 PROCEDURE — 92526 ORAL FUNCTION THERAPY: CPT | Mod: GN

## 2025-04-19 PROCEDURE — 92523 SPEECH SOUND LANG COMPREHEN: CPT | Mod: GN

## 2025-04-19 PROCEDURE — 70553 MRI BRAIN STEM W/O & W/DYE: CPT | Mod: MC

## 2025-04-19 PROCEDURE — 83690 ASSAY OF LIPASE: CPT

## 2025-04-19 PROCEDURE — 82330 ASSAY OF CALCIUM: CPT

## 2025-04-19 PROCEDURE — 92507 TX SP LANG VOICE COMM INDIV: CPT | Mod: GN

## 2025-04-19 PROCEDURE — 83550 IRON BINDING TEST: CPT

## 2025-04-19 PROCEDURE — 92610 EVALUATE SWALLOWING FUNCTION: CPT | Mod: GN

## 2025-04-19 PROCEDURE — 82607 VITAMIN B-12: CPT

## 2025-04-19 PROCEDURE — 82140 ASSAY OF AMMONIA: CPT

## 2025-04-19 PROCEDURE — 82803 BLOOD GASES ANY COMBINATION: CPT

## 2025-04-19 PROCEDURE — 80076 HEPATIC FUNCTION PANEL: CPT

## 2025-04-19 PROCEDURE — 71045 X-RAY EXAM CHEST 1 VIEW: CPT | Mod: 26

## 2025-04-19 PROCEDURE — 86140 C-REACTIVE PROTEIN: CPT

## 2025-04-19 PROCEDURE — 70450 CT HEAD/BRAIN W/O DYE: CPT | Mod: MC

## 2025-04-19 PROCEDURE — 84145 PROCALCITONIN (PCT): CPT

## 2025-04-19 PROCEDURE — 82746 ASSAY OF FOLIC ACID SERUM: CPT

## 2025-04-19 PROCEDURE — 71275 CT ANGIOGRAPHY CHEST: CPT | Mod: 26

## 2025-04-19 PROCEDURE — 82728 ASSAY OF FERRITIN: CPT

## 2025-04-19 PROCEDURE — 83519 RIA NONANTIBODY: CPT

## 2025-04-19 PROCEDURE — 83036 HEMOGLOBIN GLYCOSYLATED A1C: CPT

## 2025-04-19 PROCEDURE — 85025 COMPLETE CBC W/AUTO DIFF WBC: CPT

## 2025-04-19 PROCEDURE — 36415 COLL VENOUS BLD VENIPUNCTURE: CPT

## 2025-04-19 PROCEDURE — 84100 ASSAY OF PHOSPHORUS: CPT

## 2025-04-19 PROCEDURE — 84484 ASSAY OF TROPONIN QUANT: CPT

## 2025-04-19 PROCEDURE — 85652 RBC SED RATE AUTOMATED: CPT

## 2025-04-19 RX ORDER — ENOXAPARIN SODIUM 100 MG/ML
40 INJECTION SUBCUTANEOUS EVERY 24 HOURS
Refills: 0 | Status: DISCONTINUED | OUTPATIENT
Start: 2025-04-19 | End: 2025-04-24

## 2025-04-19 RX ORDER — DEXTROSE 50 % IN WATER 50 %
25 SYRINGE (ML) INTRAVENOUS ONCE
Refills: 0 | Status: DISCONTINUED | OUTPATIENT
Start: 2025-04-19 | End: 2025-04-25

## 2025-04-19 RX ORDER — NYSTATIN 100000 [USP'U]/G
1 CREAM TOPICAL
Refills: 0 | DISCHARGE

## 2025-04-19 RX ORDER — MAGNESIUM, ALUMINUM HYDROXIDE 200-200 MG
30 TABLET,CHEWABLE ORAL EVERY 4 HOURS
Refills: 0 | Status: DISCONTINUED | OUTPATIENT
Start: 2025-04-19 | End: 2025-04-24

## 2025-04-19 RX ORDER — INSULIN LISPRO 100 U/ML
INJECTION, SOLUTION INTRAVENOUS; SUBCUTANEOUS
Refills: 0 | Status: DISCONTINUED | OUTPATIENT
Start: 2025-04-19 | End: 2025-04-24

## 2025-04-19 RX ORDER — LEVOTHYROXINE SODIUM 300 MCG
75 TABLET ORAL DAILY
Refills: 0 | Status: DISCONTINUED | OUTPATIENT
Start: 2025-04-19 | End: 2025-04-24

## 2025-04-19 RX ORDER — ATORVASTATIN CALCIUM 80 MG/1
10 TABLET, FILM COATED ORAL AT BEDTIME
Refills: 0 | Status: DISCONTINUED | OUTPATIENT
Start: 2025-04-19 | End: 2025-04-20

## 2025-04-19 RX ORDER — DEXTROSE 50 % IN WATER 50 %
12.5 SYRINGE (ML) INTRAVENOUS ONCE
Refills: 0 | Status: DISCONTINUED | OUTPATIENT
Start: 2025-04-19 | End: 2025-04-25

## 2025-04-19 RX ORDER — MEMANTINE HYDROCHLORIDE 21 MG/1
10 CAPSULE, EXTENDED RELEASE ORAL
Refills: 0 | Status: DISCONTINUED | OUTPATIENT
Start: 2025-04-19 | End: 2025-04-20

## 2025-04-19 RX ORDER — ACETAMINOPHEN 500 MG/5ML
650 LIQUID (ML) ORAL EVERY 6 HOURS
Refills: 0 | Status: DISCONTINUED | OUTPATIENT
Start: 2025-04-19 | End: 2025-04-23

## 2025-04-19 RX ORDER — DEXTROSE 50 % IN WATER 50 %
15 SYRINGE (ML) INTRAVENOUS ONCE
Refills: 0 | Status: DISCONTINUED | OUTPATIENT
Start: 2025-04-19 | End: 2025-04-25

## 2025-04-19 RX ORDER — INSULIN LISPRO 100 U/ML
INJECTION, SOLUTION INTRAVENOUS; SUBCUTANEOUS AT BEDTIME
Refills: 0 | Status: DISCONTINUED | OUTPATIENT
Start: 2025-04-19 | End: 2025-04-24

## 2025-04-19 RX ORDER — PIPERACILLIN-TAZO-DEXTROSE,ISO 2.25G/50ML
3.38 IV SOLUTION, PIGGYBACK PREMIX FROZEN(ML) INTRAVENOUS ONCE
Refills: 0 | Status: COMPLETED | OUTPATIENT
Start: 2025-04-19 | End: 2025-04-19

## 2025-04-19 RX ORDER — MELATONIN 5 MG
3 TABLET ORAL AT BEDTIME
Refills: 0 | Status: DISCONTINUED | OUTPATIENT
Start: 2025-04-19 | End: 2025-04-24

## 2025-04-19 RX ORDER — SODIUM CHLORIDE 9 G/1000ML
1000 INJECTION, SOLUTION INTRAVENOUS
Refills: 0 | Status: DISCONTINUED | OUTPATIENT
Start: 2025-04-19 | End: 2025-04-25

## 2025-04-19 RX ORDER — ASPIRIN 325 MG
81 TABLET ORAL DAILY
Refills: 0 | Status: DISCONTINUED | OUTPATIENT
Start: 2025-04-19 | End: 2025-04-19

## 2025-04-19 RX ORDER — ONDANSETRON HCL/PF 4 MG/2 ML
4 VIAL (ML) INJECTION EVERY 8 HOURS
Refills: 0 | Status: DISCONTINUED | OUTPATIENT
Start: 2025-04-19 | End: 2025-04-25

## 2025-04-19 RX ORDER — PIPERACILLIN-TAZO-DEXTROSE,ISO 2.25G/50ML
3.38 IV SOLUTION, PIGGYBACK PREMIX FROZEN(ML) INTRAVENOUS EVERY 8 HOURS
Refills: 0 | Status: DISCONTINUED | OUTPATIENT
Start: 2025-04-19 | End: 2025-04-21

## 2025-04-19 RX ORDER — ASPIRIN 325 MG
1 TABLET ORAL
Refills: 0 | DISCHARGE

## 2025-04-19 RX ORDER — GLUCAGON 3 MG/1
1 POWDER NASAL ONCE
Refills: 0 | Status: DISCONTINUED | OUTPATIENT
Start: 2025-04-19 | End: 2025-04-25

## 2025-04-19 RX ADMIN — Medication 75 MILLILITER(S): at 19:42

## 2025-04-19 RX ADMIN — Medication 10 MILLIGRAM(S): at 17:44

## 2025-04-19 RX ADMIN — Medication 25 GRAM(S): at 23:00

## 2025-04-19 RX ADMIN — Medication 1950 MILLILITER(S): at 12:54

## 2025-04-19 RX ADMIN — Medication 200 GRAM(S): at 12:54

## 2025-04-19 NOTE — ED PROVIDER NOTE - OBJECTIVE STATEMENT
84yoF PM breast CA 84yoF PM breast CA receiving radiation therapy 84yoF PMH breast CA receiving radiation therapy, DM2, hx colon CA 2017 s/o right hemicolectomy, HTN, hypothyroid, dementia nonverbal p/w generalized weakness, hypoxia to 80% at home and per daughter pt had elevated WBC count at PCP 3d ago so was empirically started on cipro without improvement of symptoms. No fever, cp, sob, cough, vomiting, diarrhea. HPI limited 2/2 advance dementia

## 2025-04-19 NOTE — PHARMACOTHERAPY INTERVENTION NOTE - COMMENTS
Medication history complete. Medications and allergies reviewed with patient's daughter, Melissa, at bedside and confirmed with .

## 2025-04-19 NOTE — H&P ADULT - PROBLEM SELECTOR PLAN 1
Zosyn  Urology  F/u cultures -Zosyn  -Urology vs IR.  -F/u cultures #Nephrolithiasis  -CT noted above.   -Zosyn  -Urology vs IR.  -F/u cultures

## 2025-04-19 NOTE — H&P ADULT - NSHPPHYSICALEXAM_GEN_ALL_CORE
T(C): 37.7 (04-19-25 @ 12:15), Max: 37.7 (04-19-25 @ 12:15)  HR: 72 (04-19-25 @ 17:00) (72 - 93)  BP: 173/101 (04-19-25 @ 17:00) (129/77 - 178/94)  RR: 19 (04-19-25 @ 17:00) (16 - 19)  SpO2: 99% (04-19-25 @ 17:00) (89% - 100%)    General: non-toxic  HEENT: non-traumatic, perrla, eomi  Cardio: s1s2 regular rate and rhythm  Lungs: comfortable breathing, clear to auscultation  Abdomen: Soft, non-tender, non-distended  Neuro: AOx4  Ext: Pulses +2 T(C): 37.7 (04-19-25 @ 12:15), Max: 37.7 (04-19-25 @ 12:15)  HR: 72 (04-19-25 @ 17:00) (72 - 93)  BP: 173/101 (04-19-25 @ 17:00) (129/77 - 178/94)  RR: 19 (04-19-25 @ 17:00) (16 - 19)  SpO2: 99% (04-19-25 @ 17:00) (89% - 100%)    General: Frail, chronically ill, lethargic.   HEENT: non-traumatic, perrla, eomi  Cardio: s1s2   Lungs: comfortable breathing  Abdomen: Soft, non-distended  Neuro: AOx0  Ext: Pulses +2

## 2025-04-19 NOTE — PATIENT PROFILE ADULT - FALL HARM RISK - HARM RISK INTERVENTIONS

## 2025-04-19 NOTE — H&P ADULT - PROBLEM SELECTOR PLAN 3
Levothyroxine Start mod-ISF with night time coverage.   Hypoglycemia protocol  Adjust insulin as needed for glycemic control.

## 2025-04-19 NOTE — CONSULT NOTE ADULT - ASSESSMENT
85 yo F hx of breast ca with metastatic disease presenting with failure to thrive and hypoxia with CT findings of new severe hydronephrosis with 8mm R ureteral stone. WBC 9, Cr .70. UA with few bacteria. At this time, it is unclear if patients deterioration is related to new metastatic disease vs. underlying infection that is not yet revealed on labs. We will hold off on stent placement until patient is medically optimized for anesthesia. If patient rapidly deteriorates will consider perc nephrostomy tube.     - Recommend medicine admission for work up of failure to thrive   - Patient will need to be medically optimized for possible ureteral stent placement vs. perc tube   - Trend wbc   - Monitor fever curve   - Trend creatinine   - Cont abx     D/w Dr. Mckeon

## 2025-04-19 NOTE — PATIENT PROFILE ADULT - NSPROHMSYMPCOND_GEN_A_NUR
[facility]  [address]  [place] [address]  [phone]          Kai Guido  : 1931    Chief Complaint   Patient presents with    Follow-up     retention          HPI     Kai Guido is a 89 y.o. male with a PMH of BPH/LUTS s/p PAE   and congenital solitary kidney who was referred to clinic for persistent BPH/LUTS.     Seen 22 and had minimal improvement with flomax. Mirabegron was added at that time but did not help.  Therefore he stopped it     Seen in  by Dr. Haley. He complains of urinary urgency, frequency, nocturia 2-3 times per night, urge urinary incontinence daily.  No retention.  Gemtesa was added.    PVR: 35 cc at last visit.     Hospitalized in  after a fall 2/2 encephalopathy. Retention while inpt. Catheter placed at [facility]. DC to [facility].     Here today for fu. Accompanied by son. Catheter is draining wo issue. Afebrile. He is much more comfortable in regards to LUTS w catheter. Asking to keep long term.              Past Medical History:   Diagnosis Date    A-fib (HCC)     Arrhythmia     PALPITATION    Arthritis     BPH (benign prostatic hyperplasia)     CAD (coronary artery disease)     STENTS HEART     Chronic kidney disease     HAS ONLY 1 KIDNEY    Hypercholesterolemia     Hypertension     ANABELLE (obstructive sleep apnea) 2023    ANABELLE on CPAP     Stroke (HCC)     TIA secondary to holding of eliquis      Past Surgical History:   Procedure Laterality Date    APPENDECTOMY      exploratory     COLONOSCOPY      COLONOSCOPY      LEG SURGERY Left 2023    FEMUR IM NAIL WIL INSERTION ANTEGRADE, Gamma Nail performed by Robert Jacobson MD at [facility] MAIN OR    OTHER CELL      LEFT KIDNEY REMOVED for malformation; no cancer     IA UNLISTED PROCEDURE CARDIAC SURGERY      stent to circ    PTCA W/ STENT, EA ADD      UROLOGICAL SURGERY      Kidney removed      Current Outpatient Medications   Medication Sig 
none

## 2025-04-19 NOTE — CONSULT NOTE ADULT - SUBJECTIVE AND OBJECTIVE BOX
85 yo F hx of non hodgkin lymphoma, triple negative breast Ca s/p radiation (last radiated in Jan) with metastatic disease in lungs and bones presenting with failure to thrive and hypoxia. Patient lives at home with daughter and grandchildren, daughter reports her mother has been slowly deteriorating mentally and physically over the past month and worsening over the past week. This am she was fatigued so daughter checked her mothers oxygen sat with a portable pulse ox and noted it to b 86. She called EMS and patient was brought to ED. CT chest/abd/pelvis was obtained demonstrating metastatic breast ca and 8mm kidney stone with severe hydronephrosis. Patient follows with Dr. Khan for chronic postive UAs with neg culture. She had a kidney ultrasound in March which showed a small stone with mild hydro and Dr. Khan and daughter agreed that the benefit did not outweigh the risk of stent placement with general anesthesia given that her creatinine was normal and she was asymptomatic. At this time, daughter reports the patient is incontinent of urine but does not seem to be in severe pain. Patient is unable to speak at this time due to declining function.     Vital Signs Last 24 Hrs  T(C): 37.7 (19 Apr 2025 12:15), Max: 37.7 (19 Apr 2025 12:15)  T(F): 99.8 (19 Apr 2025 12:15), Max: 99.8 (19 Apr 2025 12:15)  HR: 83 (19 Apr 2025 14:00) (73 - 93)  BP: 178/94 (19 Apr 2025 14:00) (151/85 - 178/94)  BP(mean): 114 (19 Apr 2025 13:15) (103 - 117)  RR: 18 (19 Apr 2025 14:00) (16 - 19)  SpO2: 97% (19 Apr 2025 14:00) (89% - 100%)    Parameters below as of 19 Apr 2025 14:00  Patient On (Oxygen Delivery Method): nasal cannula  O2 Flow (L/min): 2    CBC:            12.4   9.53  )-----------( 250      ( 04-19-25 @ 12:22 )             37.9   Chem:         ( 04-19-25 @ 12:22 )    138  |  106  |  31[H]  ----------------------------<  234[H]  4.1   |  27  |  0.70    Liver Functions: ( 04-19-25 @ 12:22 )  Alb: 2.5 g/dL / Pro: 6.2 gm/dL / ALK PHOS: 172 U/L / ALT: 33 U/L / AST: 100 U/L / GGT: x          Physical exam:   Diaphoretic   Eyes closed, lethargic   Opens eyes briefly to painful stim   Does not speak   Does not follow commands   Abdomen distended, nontender   Skin moist

## 2025-04-19 NOTE — H&P ADULT - PROBLEM SELECTOR PLAN 8
DVT ppx: lovenox  Fall and aspiration precautions.  Turn and reposition q2h to prevent bedsores  Skin checks as per RN

## 2025-04-19 NOTE — H&P ADULT - NSHPLABSRESULTS_GEN_ALL_CORE
LABS:  cret                        12.4   9.53  )-----------( 250      ( 19 Apr 2025 12:22 )             37.9     04-19    138  |  106  |  31[H]  ----------------------------<  234[H]  4.1   |  27  |  0.70    Ca    11.2[H]      19 Apr 2025 12:22    TPro  6.2  /  Alb  2.5[L]  /  TBili  0.7  /  DBili  x   /  AST  100[H]  /  ALT  33  /  AlkPhos  172[H]  04-19    PT/INR - ( 19 Apr 2025 12:22 )   PT: 14.1 sec;   INR: 1.20 ratio      PTT - ( 19 Apr 2025 12:22 )  PTT:34.7 sec    Urinalysis with Rflx Culture (collected 04-19-25 @ 12:50)    < from: CT Abdomen and Pelvis w/ IV Cont (04.19.25 @ 13:40) >    IMPRESSION:    1. No acute pulmonary embolus.  2. New pulmonary and pleural nodules, compatible with progression of   metastatic disease.  3. Progression of osteolytic metastasis, with pathologic fractures of the   right 10th and left ninth ribs.  4. Redemonstrated severe right hydronephrosis secondary to an 8 mm stone   in the distal right ureter.  5. Unchanged bulky retroperitoneal adenopathy.  6. Unchanged masslike soft tissue in the right breast.  7. Resolution of previously seen right subpectoral and axillary   adenopathy.    --- End of Report ---    < end of copied text > LABS:  cret                        12.4   9.53  )-----------( 250      ( 19 Apr 2025 12:22 )             37.9     04-19    138  |  106  |  31[H]  ----------------------------<  234[H]  4.1   |  27  |  0.70    Ca    11.2[H]      19 Apr 2025 12:22    TPro  6.2  /  Alb  2.5[L]  /  TBili  0.7  /  DBili  x   /  AST  100[H]  /  ALT  33  /  AlkPhos  172[H]  04-19    PT/INR - ( 19 Apr 2025 12:22 )   PT: 14.1 sec;   INR: 1.20 ratio      PTT - ( 19 Apr 2025 12:22 )  PTT:34.7 sec    Urinalysis with Rflx Culture (collected 04-19-25 @ 12:50)    < from: CT Abdomen and Pelvis w/ IV Cont (04.19.25 @ 13:40) >    FINDINGS:  CHEST:  LUNGS AND LARGE AIRWAYS: Patent central airways. Several new pulmonary   micronodules, the largest measure 7 mm in the anterior segment right   upper lobe on axial series 4, image 195, and 9 mm in the superior segment   left lower lobe on axial series 4, image 322.  PLEURA: New 1.5 cm pleural nodule in the posterior left lung base   inseparable from the underlying ninth rib. Trace pleural effusions.  VESSELS: Pulmonary arteries are opacified to the segmental level. No   evidence of acute pulmonary embolus within the limitations of respiratory   motion artifact. Main pulmonary artery is normal in caliber.   Atherosclerotic changes of the aorta without aneurysm. Coronary artery   calcifications..  HEART: Heart size is normal. No pericardial effusion.  MEDIASTINUM AND JUANITO: No lymphadenopathy..  CHEST WALL AND LOWER NECK: Asymmetric masslike soft tissue in the right   breast containing a biopsy clip. Incompletely imaged complex fatty mass   in the right triceps musculature. Incompletely imaged 2.5 cm left thyroid   nodule. Resolution of previously seen right axillary and subpectoral   adenopathy.    ABDOMEN AND PELVIS:  LIVER: Multiple cystic and low-density lesions in the liver, too small to   characterize, unchanged from the prior PET/CT.  BILE DUCTS: Normal caliber.  GALLBLADDER: 5 mm density inseparable from the posterior wall of the   gallbladder on axial series 3, image 40 and sagittal series 606, image   56. This could represent a small polyp.  SPLEEN: Within normal limits.  PANCREAS: Within normal limits.  ADRENALS: Within normal limits.  KIDNEYS/URETERS: Severe right hydroureteronephrosis to level of a 8 mm   calculus in the distal right ureter, unchanged. Bilateral   nephrolithiasis. Left renal pelviectasis.    BLADDER: Foci of air in the urinary bladder, correlate for recent   catheterization  REPRODUCTIVE ORGANS: Unremarkable by CT    BOWEL: Right hemicolectomy with right upper quadrant anastomosis. No   obstruction..  PERITONEUM/RETROPERITONEUM: No free air or ascites  VESSELS: Diffuse atherosclerotic changes.  LYMPH NODES: No bulky retroperitoneal adenopathy, with the largest node   in the right common iliac chain, measures 5.2 x 4.2 cm.  ABDOMINAL WALL: Unremarkable  BONES: New lytic metastasis scattered throughout the axial skeleton.   Pathologic fractures of the posterior right 10th and anterolateral left   ninth ribs. New osseous metastases to the inferior right scapula (2-9).   Lytic metastasis in the T3 vertebral body erodes the posterior cortex.   Destructive lesion in the right iliac body with soft tissue mass   extending into the psoas and gluteus muscles.    IMPRESSION:    1. No acute pulmonary embolus.  2. New pulmonary and pleural nodules, compatible with progression of   metastatic disease.  3. Progression of osteolytic metastasis, with pathologic fractures of the   right 10th and left ninth ribs.  4. Redemonstrated severe right hydronephrosis secondary to an 8 mm stone   in the distal right ureter.  5. Unchanged bulky retroperitoneal adenopathy.  6. Unchanged masslike soft tissue in the right breast.  7. Resolution of previously seen right subpectoral and axillary   adenopathy.    --- End of Report ---    < end of copied text >

## 2025-04-19 NOTE — H&P ADULT - PROBLEM SELECTOR PLAN 7
DVT ppx: lovenox  Fall and aspiration precautions.  Turn and reposition q2h to prevent bedsores  Skin checks as per RN Fall and aspiration precautions.  Speech and swallow eval.

## 2025-04-19 NOTE — ED PROVIDER NOTE - PROGRESS NOTE DETAILS
Shravan: urology consulted. will see pt in ED I, JU Rock personally discussed the case with consultant, Dr. Fernández  attending hospitalist, about pt who has infected kidney stone, rib fractures and will need hospital admission."

## 2025-04-19 NOTE — ED ADULT NURSE NOTE - OBJECTIVE STATEMENT
pt presents to er c/o generalized weakness, per daughter at bedside SPO2 was in low 80s at home. pt went to PCP 3d ago with c/o weakness and subjective fevers, was prescribed cipro, has had no mprovement of symptoms. pt baseline dementia and nonverbal. hx of breast CA receiving radiation therapy

## 2025-04-19 NOTE — H&P ADULT - HISTORY OF PRESENT ILLNESS
84-year-old female with a past medical history of breast cancer (currently receiving radiation therapy), type 2 diabetes mellitus, colon cancer status post right hemicolectomy in 2017, hypertension, hypothyroidism, and advanced dementia (nonverbal) presents with generalized weakness. HPI is limited due to advanced dementia. Per chart review, the patient was found to have an elevated WBC count at a primary care provider visit three days ago and was empirically started on ciprofloxacin without improvement in symptoms. Today, the patient was noted to have hypoxia to 80% at home by her daughter and was brought to the emergency department for further evaluation. The patient denies fever, chest pain, shortness of breath, cough, vomiting, or diarrhea.    In ED BP elevated 173/101, SaO2 99 on 2L NC, VSS otherwise.   CBC: WBC 9, H/H 12/37, Plt 250  CMP: BUN/Cr 31/0.7, Glu 234, Alk phos 172,   Flu/COVID: negative.   UA: Cloudy, proteinuria, ketonuria, large blood, 10 wbcs, few bacteria.  CTA chest & CT abd/pelvis: No acute pulmonary embolus. New pulmonary and pleural nodules, compatible with progression of metastatic disease. Progression of osteolytic metastasis, with pathologic fractures of the right 10th and left ninth ribs. Redemonstrated severe right hydronephrosis secondary to an 8 mm stone in the distal right ureter. Unchanged bulky retroperitoneal adenopathy. Unchanged masslike soft tissue in the right breast. Resolution of previously seen right subpectoral and axillary adenopathy. 84-year-old female with a past medical history of metastatic breast cancer (currently receiving radiation therapy), type 2 diabetes mellitus, colon cancer status post right hemicolectomy in 2017, HTN, hypothyroidism, and advanced dementia (nonverbal) presents with generalized weakness. HPI is limited due to advanced dementia. Daughter at bedside provides collateral history, reporting that the patient has metastatic breast cancer and has received radiation in the past. She is currently being followed by Dr. Riddle as an outpatient for monitoring. The patient has advanced dementia and is completely dependent on her daughter for care. Over the past two weeks, the patient has experienced a decline in her health. As per the daughter, the patient was ambulatory two weeks ago but has since become more bedbound and lethargic. The patient was found to have an elevated WBC count at a primary care provider visit three days ago and was empirically started on ciprofloxacin without symptom improvement. Today, the patient was noted to have hypoxia to 80% at home by her daughter and was brought to the emergency department for further evaluation. ROS not obtainable due to the patient’s mentation.    In ED BP elevated 173/101, SaO2 99 on 2L NC, VSS otherwise.   CBC: WBC 9, H/H 12/37, Plt 250  CMP: BUN/Cr 31/0.7, Glu 234, Alk phos 172,   Flu/COVID: negative.   UA: Cloudy, proteinuria, ketonuria, large blood, 10 wbcs, few bacteria.  CTA chest & CT abd/pelvis: No acute pulmonary embolus. New pulmonary and pleural nodules, compatible with progression of metastatic disease. Progression of osteolytic metastasis, with pathologic fractures of the right 10th and left ninth ribs. Redemonstrated severe right hydronephrosis secondary to an 8 mm stone in the distal right ureter. Unchanged bulky retroperitoneal adenopathy. Unchanged masslike soft tissue in the right breast. Resolution of previously seen right subpectoral and axillary adenopathy.

## 2025-04-19 NOTE — ED ADULT NURSE NOTE - NSFALLHARMRISKINTERV_ED_ALL_ED
Assistance OOB with selected safe patient handling equipment if applicable/Assistance with ambulation/Communicate risk of Fall with Harm to all staff, patient, and family/Monitor gait and stability/Monitor for mental status changes and reorient to person, place, and time, as needed/Move patient closer to nursing station/within visual sight of ED staff/Provide visual cue: red socks, yellow wristband, yellow gown, etc/Reinforce activity limits and safety measures with patient and family/Toileting schedule using arm’s reach rule for commode and bathroom/Use of alarms - bed, stretcher, chair and/or video monitoring/Bed in lowest position, wheels locked, appropriate side rails in place/Call bell, personal items and telephone in reach/Instruct patient to call for assistance before getting out of bed/chair/stretcher/Non-slip footwear applied when patient is off stretcher/[place] to call system/Physically safe environment - no spills, clutter or unnecessary equipment/Purposeful Proactive Rounding/Room/bathroom lighting operational, light cord in reach

## 2025-04-19 NOTE — H&P ADULT - PROBLEM SELECTOR PLAN 2
Start mod-ISF with night time coverage.   Hypoglycemia protocol  Adjust insulin as needed for glycemic control. #Breast CA with mets.  -CT noted for constellation of findings.  -Heme/onc consult  -Palliative consult

## 2025-04-19 NOTE — ED ADULT TRIAGE NOTE - CHIEF COMPLAINT QUOTE
brought in by EMS from home for generalized weakness x1 week, found to be hypoxic at home in the 80's by daughter who called EMS. upon EMS arrival, patient SpO2 was 89%, she was placed on 2L NC by EMS with improvement to 97%. tactile warmth as per EMS. patient was seen at  last week, labs significant for leukocytosis, started on Cipro but no urinalysis was collected at that time-- current unknown source of infx. hx dementia, non-verbal at baseline.

## 2025-04-19 NOTE — ED PROVIDER NOTE - CLINICAL SUMMARY MEDICAL DECISION MAKING FREE TEXT BOX
Presentation concerning for sepsis 2/2 UTI, hypoxia. Plan for EKG, CXR, labs, UA, CT, monitor and likely admit. Pt given 2L NS and Zosyn empirically. Presentation concerning for sepsis 2/2 UTI, hypoxia. Plan for EKG, CXR, labs, UA, CT, monitor and likely admit. Pt given 2L NS and Zosyn empirically.  CXR shows possible right lung infiltrate. Labs show no leukocytosis, lactic WNL, trop WNL, transaminitis. UA concerning for acute UTI.   On reassessment pt found to be 88% on Ra, started on 2L NC. Presentation concerning for sepsis 2/2 UTI, hypoxia. Plan for EKG, CXR, labs, UA, CT, monitor and likely admit. Pt given 2L NS and Zosyn empirically.  EKG NSR, rate 79, normal axis, normal intervals, no ROXANA or STD (time 1446).   CXR shows possible right lung infiltrate. Labs show no leukocytosis, lactic WNL, trop WNL, transaminitis. UA concerning for acute UTI.   On reassessment pt found to be 88% on Ra, started on 2L NC. Presentation concerning for sepsis 2/2 UTI, hypoxia. Plan for EKG, CXR, labs, UA, CT, monitor and likely admit. Pt given 2L NS and Zosyn empirically.  EKG NSR, rate 79, normal axis, normal intervals, no ROXANA or STD (time 1446).   CXR shows possible right lung infiltrate. Labs show no leukocytosis, lactic WNL, trop WNL, transaminitis. UA concerning for acute UTI.   On reassessment pt found to be 88% on Ra, started on 2L NC.  Sign out given to Dr. Perez at end of shift pending CT imaging and admission

## 2025-04-19 NOTE — ED PROVIDER NOTE - CARE PLAN
Principal Discharge DX:	Kidney stone  Secondary Diagnosis:	UTI (urinary tract infection)  Secondary Diagnosis:	Multiple fractures of ribs   1

## 2025-04-19 NOTE — H&P ADULT - NSICDXPASTMEDICALHX_GEN_ALL_CORE_FT
PAST MEDICAL HISTORY:  Breast cancer     Dementia     DM (diabetes mellitus)     HLD (hyperlipidemia)     HTN (hypertension)     Hypothyroidism

## 2025-04-19 NOTE — H&P ADULT - CONVERSATION DETAILS
I discussed the patient's laboratory and imaging findings with the patient's daughter, who is also the designated healthcare proxy. Findings included concern for a mass in the pelvis and other metastatic lesions. We reviewed the potential options for interventions and the associated risks and benefits, particularly in the context of the patient’s advanced dementia and overall condition. The daughter expressed a preference for conservative management, consistent with prior discussions she has had with the patient's other physicians. She indicated that surgery might be too aggressive for the patient at this time.  The daughter inquired about the possibility of radiation therapy. I informed her that we would consult Dr. Riddle from the hematology/oncology team to provide further guidance on the risks and benefits of various interventions. She also raised concerns regarding the feasibility and risks of surgery under general anesthesia versus moderate or minimal sedation. I explained that we would await the recommendations from the urology team and discuss options with urology or interventional radiology as appropriate to determine the best course of action.  We also had a detailed discussion about goals of care. The daughter, Summer, stated that she prefers conservative measures for the patient and does not feel that cardiopulmonary resuscitation or intubation would be appropriate. She is open to exploring the option of home hospice and palliative care for the patient. Additionally, I provided the Medical Orders for Life-Sustaining Treatment form to the daughter for review. She took the document to consider the options further and stated she would make a final decision at a later time.

## 2025-04-19 NOTE — ED PROVIDER NOTE - PHYSICAL EXAMINATION
Constitutional: frail appearing, nonverbal  Eyes: EOMI, PERRL  Head: Normocephalic atraumatic  Mouth: no airway obstruction, posterior oropharynx clear without erythema or exudate  Neck: supple  Cardiac: regular rate and rhythm, no MRG  Resp: Lungs CTAB  GI: Abd s/nt/nd  Neuro: CN2-12 intact, strength 5/5x4, sensation grossly intact  Skin: No rashes, pale no

## 2025-04-20 LAB
A1C WITH ESTIMATED AVERAGE GLUCOSE RESULT: 7.4 % — HIGH (ref 4–5.6)
ADD ON TEST-SPECIMEN IN LAB: SIGNIFICANT CHANGE UP
ALBUMIN SERPL ELPH-MCNC: 2.2 G/DL — LOW (ref 3.3–5)
ALP SERPL-CCNC: 142 U/L — HIGH (ref 40–120)
ALT FLD-CCNC: 29 U/L — SIGNIFICANT CHANGE UP (ref 12–78)
ANION GAP SERPL CALC-SCNC: 7 MMOL/L — SIGNIFICANT CHANGE UP (ref 5–17)
AST SERPL-CCNC: 82 U/L — HIGH (ref 15–37)
BILIRUB SERPL-MCNC: 0.4 MG/DL — SIGNIFICANT CHANGE UP (ref 0.2–1.2)
BUN SERPL-MCNC: 29 MG/DL — HIGH (ref 7–23)
CALCIUM SERPL-MCNC: 10.5 MG/DL — HIGH (ref 8.5–10.1)
CHLORIDE SERPL-SCNC: 110 MMOL/L — HIGH (ref 96–108)
CHOLEST SERPL-MCNC: 125 MG/DL — SIGNIFICANT CHANGE UP
CO2 SERPL-SCNC: 24 MMOL/L — SIGNIFICANT CHANGE UP (ref 22–31)
CREAT SERPL-MCNC: 0.69 MG/DL — SIGNIFICANT CHANGE UP (ref 0.5–1.3)
CULTURE RESULTS: NO GROWTH — SIGNIFICANT CHANGE UP
EGFR: 84 ML/MIN/1.73M2 — SIGNIFICANT CHANGE UP
EGFR: 84 ML/MIN/1.73M2 — SIGNIFICANT CHANGE UP
ESTIMATED AVERAGE GLUCOSE: 166 MG/DL — HIGH (ref 68–114)
GLUCOSE BLDC GLUCOMTR-MCNC: 115 MG/DL — HIGH (ref 70–99)
GLUCOSE BLDC GLUCOMTR-MCNC: 160 MG/DL — HIGH (ref 70–99)
GLUCOSE BLDC GLUCOMTR-MCNC: 164 MG/DL — HIGH (ref 70–99)
GLUCOSE BLDC GLUCOMTR-MCNC: 192 MG/DL — HIGH (ref 70–99)
GLUCOSE SERPL-MCNC: 191 MG/DL — HIGH (ref 70–99)
HCT VFR BLD CALC: 33.8 % — LOW (ref 34.5–45)
HDLC SERPL-MCNC: 39 MG/DL — LOW
HGB BLD-MCNC: 10.9 G/DL — LOW (ref 11.5–15.5)
LDLC SERPL-MCNC: 63 MG/DL — SIGNIFICANT CHANGE UP
LIPID PNL WITH DIRECT LDL SERPL: 63 MG/DL — SIGNIFICANT CHANGE UP
MAGNESIUM SERPL-MCNC: 1.8 MG/DL — SIGNIFICANT CHANGE UP (ref 1.6–2.6)
MCHC RBC-ENTMCNC: 27.5 PG — SIGNIFICANT CHANGE UP (ref 27–34)
MCHC RBC-ENTMCNC: 32.2 G/DL — SIGNIFICANT CHANGE UP (ref 32–36)
MCV RBC AUTO: 85.4 FL — SIGNIFICANT CHANGE UP (ref 80–100)
NONHDLC SERPL-MCNC: 85 MG/DL — SIGNIFICANT CHANGE UP
NRBC # BLD AUTO: 0 K/UL — SIGNIFICANT CHANGE UP (ref 0–0)
NRBC # FLD: 0 K/UL — SIGNIFICANT CHANGE UP (ref 0–0)
NRBC BLD AUTO-RTO: 0 /100 WBCS — SIGNIFICANT CHANGE UP (ref 0–0)
PHOSPHATE SERPL-MCNC: 3.1 MG/DL — SIGNIFICANT CHANGE UP (ref 2.5–4.5)
PLATELET # BLD AUTO: 233 K/UL — SIGNIFICANT CHANGE UP (ref 150–400)
PMV BLD: 9.6 FL — SIGNIFICANT CHANGE UP (ref 7–13)
POTASSIUM SERPL-MCNC: 3.9 MMOL/L — SIGNIFICANT CHANGE UP (ref 3.5–5.3)
POTASSIUM SERPL-SCNC: 3.9 MMOL/L — SIGNIFICANT CHANGE UP (ref 3.5–5.3)
PROT SERPL-MCNC: 5.4 GM/DL — LOW (ref 6–8.3)
RBC # BLD: 3.96 M/UL — SIGNIFICANT CHANGE UP (ref 3.8–5.2)
RBC # FLD: 14.6 % — HIGH (ref 10.3–14.5)
SODIUM SERPL-SCNC: 141 MMOL/L — SIGNIFICANT CHANGE UP (ref 135–145)
SPECIMEN SOURCE: SIGNIFICANT CHANGE UP
TRIGL SERPL-MCNC: 125 MG/DL — SIGNIFICANT CHANGE UP
WBC # BLD: 8.63 K/UL — SIGNIFICANT CHANGE UP (ref 3.8–10.5)
WBC # FLD AUTO: 8.63 K/UL — SIGNIFICANT CHANGE UP (ref 3.8–10.5)

## 2025-04-20 PROCEDURE — 70450 CT HEAD/BRAIN W/O DYE: CPT | Mod: 26

## 2025-04-20 PROCEDURE — 99222 1ST HOSP IP/OBS MODERATE 55: CPT

## 2025-04-20 PROCEDURE — 99233 SBSQ HOSP IP/OBS HIGH 50: CPT

## 2025-04-20 RX ORDER — POTASSIUM CHLORIDE, DEXTROSE MONOHYDRATE AND SODIUM CHLORIDE 150; 5; 900 MG/100ML; G/100ML; MG/100ML
1000 INJECTION, SOLUTION INTRAVENOUS
Refills: 0 | Status: DISCONTINUED | OUTPATIENT
Start: 2025-04-20 | End: 2025-04-23

## 2025-04-20 RX ORDER — BISACODYL 5 MG
5 TABLET, DELAYED RELEASE (ENTERIC COATED) ORAL AT BEDTIME
Refills: 0 | Status: DISCONTINUED | OUTPATIENT
Start: 2025-04-20 | End: 2025-04-24

## 2025-04-20 RX ORDER — MAGNESIUM HYDROXIDE 400 MG/5ML
30 SUSPENSION ORAL DAILY
Refills: 0 | Status: DISCONTINUED | OUTPATIENT
Start: 2025-04-20 | End: 2025-04-24

## 2025-04-20 RX ORDER — MAGNESIUM SULFATE 500 MG/ML
1 SYRINGE (ML) INJECTION ONCE
Refills: 0 | Status: COMPLETED | OUTPATIENT
Start: 2025-04-20 | End: 2025-04-20

## 2025-04-20 RX ADMIN — Medication 25 GRAM(S): at 14:50

## 2025-04-20 RX ADMIN — INSULIN LISPRO 2: 100 INJECTION, SOLUTION INTRAVENOUS; SUBCUTANEOUS at 09:19

## 2025-04-20 RX ADMIN — ENOXAPARIN SODIUM 40 MILLIGRAM(S): 100 INJECTION SUBCUTANEOUS at 16:00

## 2025-04-20 RX ADMIN — Medication 5 MILLIGRAM(S): at 22:10

## 2025-04-20 RX ADMIN — Medication 10 MILLIGRAM(S): at 11:47

## 2025-04-20 RX ADMIN — POTASSIUM CHLORIDE, DEXTROSE MONOHYDRATE AND SODIUM CHLORIDE 60 MILLILITER(S): 150; 5; 900 INJECTION, SOLUTION INTRAVENOUS at 14:50

## 2025-04-20 RX ADMIN — Medication 25 GRAM(S): at 05:32

## 2025-04-20 RX ADMIN — Medication 100 GRAM(S): at 19:01

## 2025-04-20 RX ADMIN — Medication 25 GRAM(S): at 22:09

## 2025-04-20 RX ADMIN — INSULIN LISPRO 2: 100 INJECTION, SOLUTION INTRAVENOUS; SUBCUTANEOUS at 12:36

## 2025-04-20 RX ADMIN — Medication 650 MILLIGRAM(S): at 22:21

## 2025-04-20 NOTE — SWALLOW BEDSIDE ASSESSMENT ADULT - SWALLOW EVAL: SECRETION MANAGEMENT
Unable to formally assess due to altered mentation with reduced active participation/task comprehension.

## 2025-04-20 NOTE — DIETITIAN INITIAL EVALUATION ADULT - PERTINENT LABORATORY DATA
04-20    141  |  110[H]  |  29[H]  ----------------------------<  191[H]  3.9   |  24  |  0.69    Ca    10.5[H]      20 Apr 2025 06:20  Phos  3.1     04-20  Mg     1.8     04-20    TPro  5.4[L]  /  Alb  2.2[L]  /  TBili  0.4  /  DBili  x   /  AST  82[H]  /  ALT  29  /  AlkPhos  142[H]  04-20  POCT Blood Glucose.: 192 mg/dL (04-20-25 @ 09:12)

## 2025-04-20 NOTE — SWALLOW BEDSIDE ASSESSMENT ADULT - SLP GENERAL OBSERVATIONS
On encounter, the pt was alert. She was interactive but inattentive/internally distractible. The pt periodically responsively verbalized when engaged in concrete personally relevant Q/A dyads. At these times, pt's verbalizations were brief, fragmented, often unintelligible and typically contextually irrelevant when decipherable. The latter is indicative of Cognitive Linguistic Dysfunction in setting of Encephalopathy associated with acute medical issues(i.e UTI, right hydronephrosis with presence of a right ureteral stone, chest imaging revealing multiple pulmonary nodules suspect for metastasis, etc) and underlying Dementia. Response effectiveness during Q/A dyads less than 15%. Verbal elaboration and cognitive redirection cues of little clinical benefit.

## 2025-04-20 NOTE — SWALLOW BEDSIDE ASSESSMENT ADULT - ASR SWALLOW RECOMMEND DIAG
DEFER MBS GIVEN OBSERVED CLINICAL TOLERANCE TO SUGGESTED FOOD TEXTURES FROM AN OROPHARYNGEAL SWALLOWING STANCE ON EXAM AND CONSIDERING HER ALTERED MENTATION WITH ORAL DYSPHAGIA OF FLUCTUATING SEVERITY.

## 2025-04-20 NOTE — DIETITIAN INITIAL EVALUATION ADULT - ADD RECOMMEND
1. Rec regular diet + SLP recs for consistency  2. Add ensure plus high protein 1x daily to optimize PO intake (provides 350 kcal, 20g protein/ shake) and High-protein mousse 1x daily (provides 394 kcal, 17g protein/ container)   3. MVI w/ minerals daily to ensure 100% RDA met  4. Consider adding thiamine 100 mg daily 2/2 poor PO intake/ malnutrition  5. Monitor bowel movements, if no BM for >3 days, consider implementing bowel regimen.  6. Monitor daily lytes/min and replete prn  7. Obtain weekly wt to track/trend changes  8. Consider adding appetite stimulant such as Remeron or Marinol 2/2 chronically poor appetite/ PO intake  9. Encourage protein-rich foods, maximize food preferences  10. Tray set-up, open all containers; meal encouragement and assistance as needed  11. Confirm goals of care regarding nutrition support. Nutrition support is not recommended due to overall declining medical status which evidenced based studies indicate EN is not effective in prolonging survival and improving quality of life. It can also increase risk of aspiration pneumonia as well as other related issues (infection, GI complications, and worsening/ non-healing PI's). However, will provide nutrition/ hydration within GOC.  RD will continue to monitor PO intake, labs, hydration, and wt prn.

## 2025-04-20 NOTE — DIETITIAN INITIAL EVALUATION ADULT - PROBLEM SELECTOR PLAN 2
#Breast CA with mets.  -CT noted for constellation of findings.  -Heme/onc consult  -Palliative consult

## 2025-04-20 NOTE — SWALLOW BEDSIDE ASSESSMENT ADULT - ADDITIONAL RECOMMENDATIONS
HOSPITALIST, PALLIATIVE CARE AND NUTRITION F/U. PT AT NUTRITION RISK. WITH THAT BEING STATED, I DO NOT FEEL THAT PLACEMENT OF A FEEDING TUBE WOULD ENHANCE LIFE QUALITY GIVEN ADVANCED AGE/DEMENTIA/MALIGNANCY.

## 2025-04-20 NOTE — SWALLOW BEDSIDE ASSESSMENT ADULT - COMMENTS
The pt was admitted to  with progressive mental/physical decline. Hospital course is remarkable for UTI, right hydronephrosis with presence of a right ureteral stone, chest imaging revealing multiple pulmonary nodules suspect for metastasis and generalized deconditioning. This profile is superimposed upon a history of Breast Cancer for which RT is being received, Dementia, HTN, HLD, hypothyroidism and past Colon cancer s/p colectomy.

## 2025-04-20 NOTE — SWALLOW BEDSIDE ASSESSMENT ADULT - SWALLOW EVAL: PROGNOSIS
2) On encounter, the pt was alert. She was interactive but inattentive/internally distractible. The pt periodically verbalized during communicative probes/when engaged in Q/A dyads. At these times, pt's verbalizations were brief, fragmented, often unintelligible and typically contextually irrelevant when decipherable. The latter is indicative of Cognitive Linguistic Dysfunction in setting of Encephalopathy associated with acute medical issues(i.e UTI, right hydronephrosis with presence of a right ureteral stone, chest imaging revealing multiple pulmonary nodules suspect for metastasis, etc) and underlying Dementia.

## 2025-04-20 NOTE — CONSULT NOTE ADULT - ASSESSMENT
84 y.o. 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  Admitted with functional decline over last 2 weeks, found to have UTI / hydronephrosis secondary to nephrolithiasis.    CT c/a/p - new pulm nodules c/w metastatic disease. Rib mets. Bulky adenopathy. Unchanged R breast mass like lesion.Resolution of previously seen right subpectoral and axillary adenopathy.     Plan:  Patient with progressive TNBC. She is known to Dr. Franz. Given advanced dementia and dependence on all ADLs, she was not deemed a candidate for systemic therapy.  Palliative care evaluation is advised. Hospice is appropriate.      IN PROGRESS     84 y.o. 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  Admitted with functional decline over last 2 weeks, found to have UTI / hydronephrosis.    CT c/a/p - new pulm nodules c/w metastatic disease. Rib mets. Bulky adenopathy. Unchanged R breast mass like lesion.Resolution of previously seen right subpectoral and axillary adenopathy.     Plan:  On abx  Patient with progressive TNBC. She is known to Dr. Franz. Given advanced dementia and dependence on all ADLs, she was not deemed a candidate for systemic therapy.  Palliative care evaluation is advised. Hospice is appropriate.

## 2025-04-20 NOTE — SWALLOW BEDSIDE ASSESSMENT ADULT - NS SPL SWALLOW CLINIC TRIAL FT
The patient exhibited reduced orientation to feeding(i.e inattention, distractibility) atop Oral Dysphagia which subjectively appeared to be a grossly functional condition with a restricted inventory of modified food textures. Bolus formation/transfer were moderately prolonged/discontinuous but felt to be grossly mechanically functional with a few modified food textures. Piecemeal deglutition was evident. Her underlying pharyngeal integrity subjectively appeared to be grossly within functional parameters for age. Swallow triggered in an acceptable time frame for age and laryngeal lift on palpation during swallowing trials was felt to be grossly functional for age as well. NO behavioral aspiration signs exhibited with thin liquids, mildly thick liquids, puree foods, & minced/moist foods but oral processing was excessively prolonged/discontinuous with the latter food texture. Coarse solids not offered given severity of oral Dysphagia. Suggest a Puree Texture Diet with Thin Liquids to accommodate pt's oral dysphagic sequel. Pt's Oral Dysphagia is in setting of Encephalopathy associated with acute medical issues(i.e UTI, right hydronephrosis with presence of a right ureteral stone, chest imaging revealing multiple pulmonary nodules suspect for metastasis, etc) and underlying Dementia. The pt's Oral Dysphagia is apt to fluctuate in severity with changes in alertness/mentation.

## 2025-04-20 NOTE — DIETITIAN INITIAL EVALUATION ADULT - PROBLEM SELECTOR PLAN 3
Start mod-ISF with night time coverage.   Hypoglycemia protocol  Adjust insulin as needed for glycemic control.

## 2025-04-20 NOTE — DIETITIAN INITIAL EVALUATION ADULT - ORAL NUTRITION SUPPLEMENTS
Add ensure plus high protein 1x daily to optimize PO intake (provides 350 kcal, 20g protein/ shake)  High-protein mousse 1x daily (provides 394 kcal, 17g protein/ container)

## 2025-04-20 NOTE — SWALLOW BEDSIDE ASSESSMENT ADULT - SWALLOW EVAL: DIAGNOSIS
1) Pt exhibits reduced orientation to feeding(i.e inattention, distractibility) atop Oral Dysphagia which subjectively appears to be a grossly functional condition with a restricted inventory of modified food textures. Her underlying pharyngeal integrity subjectively appeared to be grossly within functional parameters for age. NO behavioral aspiration signs exhibited with thin liquids, mildly thick liquids, puree foods, & minced/moist foods but oral processing was excessively prolonged/discontinuous with the latter food texture. Coarse solids not offered given severity of oral Dysphagia. Pt's Oral Dysphagia is apt to fluctuate in severity with changes in alertness/mentation. Pt's Oral Dysphagia is in setting of Encephalopathy associated with acute medical issues(i.e UTI, right hydronephrosis with presence of a right ureteral stone, chest imaging revealing multiple pulmonary nodules suspect for metastasis, etc) and underlying Dementia.

## 2025-04-20 NOTE — DIETITIAN INITIAL EVALUATION ADULT - OTHER INFO
85 y/o F with a past medical history of metastatic breast cancer (currently receiving radiation therapy), type 2 diabetes mellitus, colon cancer status post right hemicolectomy in 2017, HTN, hypothyroidism, and advanced dementia (nonverbal) presents with generalized weakness. HPI is limited due to advanced dementia. Daughter at bedside provides collateral history, reporting that the patient has metastatic breast cancer and has received radiation in the past. She is currently being followed by Dr. Riddle as an outpatient for monitoring. The patient has advanced dementia and is completely dependent on her daughter for care. Over the past two weeks, the patient has experienced a decline in her health. As per the daughter, the patient was ambulatory two weeks ago but has since become more bedbound and lethargic. The patient was found to have an elevated WBC count at a primary care provider visit three days ago and was empirically started on ciprofloxacin without symptom improvement. Today, the patient was noted to have hypoxia to 80% at home by her daughter and was brought to the emergency department for further evaluation. CTA chest & CT abd/pelvis: No acute pulmonary embolus. New pulmonary and pleural nodules, compatible with progression of metastatic disease. Progression of osteolytic metastasis, with pathologic fractures of the right 10th and left ninth ribs. Redemonstrated severe right hydronephrosis secondary to an 8 mm stone in the distal right ureter. Unchanged bulky retroperitoneal adenopathy. Unchanged masslike soft tissue in the right breast. Resolution of previously seen right subpectoral and axillary adenopathy. Per EMR - chart note: "Summer verbally consented to change the patient’s code status to Do Not Resuscitate and Do Not Intubate, with the option for non-invasive positive pressure ventilation as appropriate." No MOLST in chart at this time. Admitting diagnosis: kidney stone, UTI    Pt dtr at bedside upon RD visit. pt nonverbal w/ adv dementia unable to provide information. Dtr reports pt ater almost whole aminata from hamburger last night. Did endorse some coughing ? if pt w/ difficulty swallowing/ falling asleep while eating. Pending SLP evaluation. reports UBW~140# now ~137# per dtr. RD obtained bedscale wt 131# on 4/20. No wt history to review. 6# wt loss/ 4.37% x unknown timeframe ? clin sig. Pt appears thin, NFPE revelas mod-sev muscle/fat wasting. Recommend to c/w regular diet + SLP recs for consistency. Will add ensure plus high protein 1x daily to optimize PO intake (provides 350 kcal, 20g protein/ shake) and High-protein mousse 1x daily (provides 394 kcal, 17g protein/ container). Consider adding appetite stimulant such as Remeron or Marinol 2/2 chronically poor appetite/ PO intake. Encourage protein-rich foods, maximize food preferences Confirm goals of care regarding nutrition support. Nutrition support is not recommended due to overall declining medical status which evidenced based studies indicate EN is not effective in prolonging survival and improving quality of life. It can also increase risk of aspiration pneumonia as well as other related issues (infection, GI complications, and worsening/ non-healing PI's). However, will provide nutrition/ hydration within GOC. Please see additional recommendations below.

## 2025-04-20 NOTE — SWALLOW BEDSIDE ASSESSMENT ADULT - SWALLOW EVAL: RECOMMENDED DIET
SUGGEST A PUREE TEXTURE DIET WITH THIN LIQUID CONSISTENCIES AS THE PATIENT APPEARS CLINICALLY TOLERANT OF THESE FOOD CONSISTENCIES FROM AN OROPHARYNGEAL SWALLOWING PERSPECTIVE ON EXAM WHEN ALERT AND FOOD TEXTURES ON THIS DIET ACCOMMODATES HER ORAL DYSPHAGIC SEQUEL OF FLUCTUATING SEVERITY.

## 2025-04-20 NOTE — DIETITIAN INITIAL EVALUATION ADULT - ORAL INTAKE PTA/DIET HISTORY
Pt dtr at bedside and provided information on intake /diet history PTA. Reports pt would eat small meals (chicken noodle soup, chilli, etc). W/ constipation. Recently started using ensure shakes to take meds w/. Pt w/ adv dementia and non verbal.

## 2025-04-20 NOTE — DIETITIAN INITIAL EVALUATION ADULT - PERTINENT MEDS FT
MEDICATIONS  (STANDING):  atorvastatin 10 milliGRAM(s) Oral at bedtime  dextrose 5%. 1000 milliLiter(s) (100 mL/Hr) IV Continuous <Continuous>  dextrose 5%. 1000 milliLiter(s) (50 mL/Hr) IV Continuous <Continuous>  dextrose 50% Injectable 25 Gram(s) IV Push once  dextrose 50% Injectable 12.5 Gram(s) IV Push once  dextrose 50% Injectable 25 Gram(s) IV Push once  enoxaparin Injectable 40 milliGRAM(s) SubCutaneous every 24 hours  glucagon  Injectable 1 milliGRAM(s) IntraMuscular once  insulin lispro (ADMELOG) corrective regimen sliding scale   SubCutaneous three times a day before meals  insulin lispro (ADMELOG) corrective regimen sliding scale   SubCutaneous at bedtime  levothyroxine 75 MICROGram(s) Oral daily  memantine 10 milliGRAM(s) Oral two times a day  piperacillin/tazobactam IVPB.. 3.375 Gram(s) IV Intermittent every 8 hours  sodium chloride 0.45% with potassium chloride 20 mEq/L 1000 milliLiter(s) (60 mL/Hr) IV Continuous <Continuous>    MEDICATIONS  (PRN):  acetaminophen     Tablet .. 650 milliGRAM(s) Oral every 6 hours PRN Temp greater or equal to 38C (100.4F), Mild Pain (1 - 3)  aluminum hydroxide/magnesium hydroxide/simethicone Suspension 30 milliLiter(s) Oral every 4 hours PRN Dyspepsia  dextrose Oral Gel 15 Gram(s) Oral once PRN Blood Glucose LESS THAN 70 milliGRAM(s)/deciliter  hydrALAZINE Injectable 10 milliGRAM(s) IV Push every 6 hours PRN SBP > 160  melatonin 3 milliGRAM(s) Oral at bedtime PRN Insomnia  ondansetron Injectable 4 milliGRAM(s) IV Push every 8 hours PRN Nausea and/or Vomiting

## 2025-04-20 NOTE — PROGRESS NOTE ADULT - SUBJECTIVE AND OBJECTIVE BOX
Subjective:  Chief complain : lethargy , minimally responsive, poor po intake   HPI:  83 y/o female with PMH of metastatic breast cancer triple negative,  (currently receiving radiation therapy), h/o colon cancer status post right hemicolectomy in 2017, advanced vascular dementia, HTN, hypothyroidism, DM2 diet controlled  admitted on 4/19 for  generalized weakness. HPI is limited due to advanced dementia. Daughter at bedside provides collateral history. Over the past two weeks, the patient has experienced a decline in her health. As per the daughter, the patient was ambulatory two weeks ago but has since become more bedbound and lethargic. The patient was found to have an elevated WBC count at a primary care provider visit three days ago and was empirically started on ciprofloxacin without symptom improvement. Today, the patient was noted to have hypoxia to 80% at home by her daughter and was brought to the emergency department for further evaluation. ROS not obtainable due to the patient’s mentation.      In ED BP elevated 173/101, SaO2 99 on 2L NC, VSS otherwise.  CBC: WBC 9, H/H 12/37, Plt 250 CMP: BUN/Cr 31/0.7, Glu 234, Alk phos 172,  Flu/COVID: negative.   UA: Cloudy, proteinuria, ketonuria, large blood, 10 wbcs, few bacteria. CTA chest & CT abd/pelvis: No acute pulmonary embolus. New pulmonary and pleural nodules, compatible with progression of metastatic disease. Progression of osteolytic metastasis, with pathologic fractures of the right 10th and left ninth ribs. Redemonstrated severe right hydronephrosis secondary to an 8 mm stone in the distal right ureter. Unchanged bulky retroperitoneal adenopathy. Unchanged masslike soft tissue in the right breast. Resolution of previously seen right subpectoral and axillary adenopathy.     4/20 -  Patient seen and examined at bedside earlier today, daughter at bedside provides history , pt non-verbal, unable to follow  commands, opens the eyes, not using right arm, + right arm tremor noted, unable to move legs is asked , afebrile,  plan explained at length    Review of system-  unable to obtain due to mental status      Vital sings reviewed for last 24 h  T(C): 37 (04-20-25 @ 15:02), Max: 37.7 (04-19-25 @ 18:29)  HR: 79 (04-20-25 @ 15:02) (72 - 89)  BP: 138/80 (04-20-25 @ 15:02) (129/77 - 173/101)  RR: 18 (04-20-25 @ 15:02) (17 - 21)  SpO2: 95% (04-20-25 @ 15:02) (94% - 100%)    POCT Blood Glucose.: 164 mg/dL (20 Apr 2025 12:32)  POCT Blood Glucose.: 192 mg/dL (20 Apr 2025 09:12)  POCT Blood Glucose.: 225 mg/dL (19 Apr 2025 21:53)      Physical exam:   General : NAD, appear to be of stated age , well groomed , + lethargic   NERVOUS SYSTEM:  Alert & Oriented X0, opens eyes, not following commands, limited exam, not using RUE, + RUE resting tremor, unable to test sensation and gait  HEAD:  Atraumatic, Normocephalic  EYES: EOMI, PERRLA, conjunctiva and sclera clear  HEENT: dry  mucous membranes, Supple neck , No JVD  CHEST: BS decrease at bases bilaterally; No rales, no rhonchi, no wheezing  HEART: Regular rate and rhythm; No murmurs, no rubs or gallops  ABDOMEN: Soft, RUQ , RLQ tenderness,  Non-distended; Bowel sounds present, no guarding , no peritoneal irritation   GENITOURINARY- Voiding, no suprapubic tenderness  EXTREMITIES:  2+ Peripheral Pulses, No clubbing, cyanosis,   edema  MUSCULOSKELETAL:- No muscle tenderness, Muscle tone normal, No joint tenderness, no Joint swelling,  Joint ROM –normal   SKIN-no rash, no lesion , + right breast hard mass on palpation    Labs radiologic and other test : all reviewed and interpreted :                         10.9   8.63  )-----------( 233      ( 20 Apr 2025 06:20 )             33.8     04-20    141  |  110[H]  |  29[H]  ----------------------------<  191[H]  3.9   |  24  |  0.69    Ca    10.5[H]      20 Apr 2025 06:20  Phos  3.1     04-20  Mg     1.8     04-20    TPro  5.4[L]  /  Alb  2.2[L]  /  TBili  0.4  /  DBili  x   /  AST  82[H]  /  ALT  29  /  AlkPhos  142[H]  04-20    PT/INR - ( 19 Apr 2025 12:22 )   PT: 14.1 sec;   INR: 1.20 ratio         PTT - ( 19 Apr 2025 12:22 )  PTT:34.7 sec    CT Abdomen and Pelvis w/ IV Cont (04.19.25 @ 13:40) >    IMPRESSION:    1. No acute pulmonary embolus.  2. New pulmonary and pleural nodules, compatible with progression of   metastatic disease.  3. Progression of osteolytic metastasis, with pathologic fractures of the   right 10th and left ninth ribs.  4. Redemonstrated severe right hydronephrosis secondary to an 8 mm stone   in the distal right ureter.  5. Unchanged bulky retroperitoneal adenopathy.  6. Unchanged masslike soft tissue in the right breast.  7. Resolution of previously seen right subpectoral and axillary   adenopathy.          RECENT CULTURES:    Urine Microscopic-Add On (NC) (04.19.25 @ 12:50)    White Blood Cell - Urine: 10 /HPF   Red Blood Cell - Urine: 1 /HPF   Bacteria: Few /HPF   Cast: 1 /LPF   Hyaline Casts: None Seen   Epithelial Cells: 2 /HPF   Calcium Oxalate Crystals: Present   Comment - Urine: Few mucus strands        Culture - Urine (04.19.25 @ 12:50)    Specimen Source: Urine None   Culture Results:   No growth      Cardiac testing : reviewed   EKG     12 Lead ECG (04.19.25 @ 14:46) >  Ventricular Rate 79 BPM  QTC Calculation(Bazett) 428 ms   Normal sinus rhythm  Nonspecific T wave abnormality  Abnormal ECG  No previous ECGs available        Procedures :     Devices:     Current medications:  acetaminophen     Tablet .. 650 milliGRAM(s) Oral every 6 hours PRN  aluminum hydroxide/magnesium hydroxide/simethicone Suspension 30 milliLiter(s) Oral every 4 hours PRN  atorvastatin 10 milliGRAM(s) Oral at bedtime  dextrose 5%. 1000 milliLiter(s) IV Continuous <Continuous>  dextrose 5%. 1000 milliLiter(s) IV Continuous <Continuous>  dextrose 50% Injectable 25 Gram(s) IV Push once  dextrose 50% Injectable 12.5 Gram(s) IV Push once  dextrose 50% Injectable 25 Gram(s) IV Push once  dextrose Oral Gel 15 Gram(s) Oral once PRN  enoxaparin Injectable 40 milliGRAM(s) SubCutaneous every 24 hours  glucagon  Injectable 1 milliGRAM(s) IntraMuscular once  hydrALAZINE Injectable 10 milliGRAM(s) IV Push every 6 hours PRN  insulin lispro (ADMELOG) corrective regimen sliding scale   SubCutaneous three times a day before meals  insulin lispro (ADMELOG) corrective regimen sliding scale   SubCutaneous at bedtime  levothyroxine 75 MICROGram(s) Oral daily  melatonin 3 milliGRAM(s) Oral at bedtime PRN  memantine 10 milliGRAM(s) Oral two times a day  ondansetron Injectable 4 milliGRAM(s) IV Push every 8 hours PRN  piperacillin/tazobactam IVPB.. 3.375 Gram(s) IV Intermittent every 8 hours  sodium chloride 0.45% with potassium chloride 20 mEq/L 1000 milliLiter(s) IV Continuous <Continuous>

## 2025-04-20 NOTE — PROGRESS NOTE ADULT - ASSESSMENT
· 85 y/o female with PMH of metastatic breast cancer triple negative,  (currently receiving radiation therapy), h/o colon cancer status post right hemicolectomy in 2017, advanced vascular dementia, HTN, hypothyroidism, DM2 diet controlled  admitted on 4/19 for  generalized weakness. HPI is limited due to advanced dementia. Daughter at bedside provides collateral history. Over the past two weeks, the patient has experienced a decline in her health. As per the daughter, the patient was ambulatory two weeks ago but has since become more bedbound and lethargic. The patient was found to have an elevated WBC count at a primary care provider visit three days ago and was empirically started on ciprofloxacin without symptom improvement. Today, the patient was noted to have hypoxia to 80% at home by her daughter and was brought to the emergency department for further evaluation. ROS not obtainable due to the patient’s mentation.      # Metabolic encephalopathy , multifactorial r/o CVA, brain mets vs progression of dementia  # Acute hypoxic respiratory failure , Pulmonary nodules, pleural nodules due to metastatic disease ruled out PE   # Pathologic 9 th L and 10 th  R rib fractures with bone metastasis  # Hypercalcemia  # Pyuria, probable partially treated UTI   # Severe right sided hydronephrosis, retroperitoneal LAD  # Nephrolithiasis   # Metastatic triple negative breast cancer with mets to bones, lungs, LAD  - CTA chest & CT abd/pelvis: no PE, +  New pulmonary and pleural nodules, compatible with progression of metastatic disease. Progression of osteolytic metastasis, with pathologic fractures of the right 10th and left ninth ribs. Redemonstrated severe right hydronephrosis secondary to an 8 mm stone in the distal right ureter. Unchanged bulky retroperitoneal adenopathy. Unchanged masslike soft tissue in the right breast. Resolution of previously seen right subpectoral and axillary adenopathy.   - CT head   - MRI head - pending   - check ammonia, TSH, vit B12, folate, CRP, procalcitonin , free Ca, PTH  - O2 supplementation   - IV fluids for hypercalcemia  - c/w Zosyn  - hold memantine - can cause sedation  - neurology consult   - Urology  consult  - F/u cultures.    # Right sided abdominal pain, constipation  # Liver lesions on CT, GB polyp   - repeat liver function tests  - check lipase   - stop statin   - check CK, lipid profile   - bowel regiment      # Metastatic breast cancer   - Heme/onc consult  -Palliative consult. for GOC     #  DM (diabetes mellitus).  diet controlled  - diet purreed with supplements   -  mod-ISF with night time coverage.   - Hypoglycemia protocol      #  Hypothyroidism.   - c/w  Levothyroxine. check TSH, free T4      # HTN (hypertension).   - s/p IV hydralazine  - lower dose of valsartan 80 chantelle     #  HLD (hyperlipidemia).   - hold  Statin.  - repeat hepatic function tests in am    #  Dementia. vascular  # Dysphagia  # RUE tremor   -  Fall and aspiration precautions.  - speech and swallow eval. - pureed diet      DVT ppx: lovenox  Turn and reposition q2h to prevent bedsores

## 2025-04-20 NOTE — CONSULT NOTE ADULT - SUBJECTIVE AND OBJECTIVE BOX
REASON FOR CONSULTATION:     HPI:  84-year-old female with a past medical history of metastatic breast cancer (currently receiving radiation therapy), type 2 diabetes mellitus, colon cancer status post right hemicolectomy in 2017, HTN, hypothyroidism, and advanced dementia (nonverbal) presents with generalized weakness. HPI is limited due to advanced dementia. Daughter at bedside provides collateral history, reporting that the patient has metastatic breast cancer and has received radiation in the past. She is currently being followed by Dr. Riddle as an outpatient for monitoring. The patient has advanced dementia and is completely dependent on her daughter for care. Over the past two weeks, the patient has experienced a decline in her health. As per the daughter, the patient was ambulatory two weeks ago but has since become more bedbound and lethargic. The patient was found to have an elevated WBC count at a primary care provider visit three days ago and was empirically started on ciprofloxacin without symptom improvement. Today, the patient was noted to have hypoxia to 80% at home by her daughter and was brought to the emergency department for further evaluation. ROS not obtainable due to the patient’s mentation.    In ED BP elevated 173/101, SaO2 99 on 2L NC, VSS otherwise.   CBC: WBC 9, H/H 12/37, Plt 250  CMP: BUN/Cr 31/0.7, Glu 234, Alk phos 172,   Flu/COVID: negative.   UA: Cloudy, proteinuria, ketonuria, large blood, 10 wbcs, few bacteria.  CTA chest & CT abd/pelvis: No acute pulmonary embolus. New pulmonary and pleural nodules, compatible with progression of metastatic disease. Progression of osteolytic metastasis, with pathologic fractures of the right 10th and left ninth ribs. Redemonstrated severe right hydronephrosis secondary to an 8 mm stone in the distal right ureter. Unchanged bulky retroperitoneal adenopathy. Unchanged masslike soft tissue in the right breast. Resolution of previously seen right subpectoral and axillary adenopathy. (19 Apr 2025 18:06)      REVIEW OF SYSTEMS:  Constitutional, Eyes, ENT, Cardiovascular, Respiratory, Gastrointestinal, Genitourinary, Musculoskeletal, Integumentary, Neurological, Psychiatric, Endocrine, Heme/Lymph, and Allergic/Immunologic review of systems are otherwise negative except as noted in the HPI.    PAST MEDICAL & SURGICAL HISTORY:  DM (diabetes mellitus)      Hypothyroidism      Dementia      HLD (hyperlipidemia)      HTN (hypertension)      Breast cancer          FAMILY HISTORY:      SOCIAL HISTORY:    Allergies    No Known Allergies    Intolerances        MEDICATIONS  (STANDING):  atorvastatin 10 milliGRAM(s) Oral at bedtime  dextrose 5%. 1000 milliLiter(s) (100 mL/Hr) IV Continuous <Continuous>  dextrose 5%. 1000 milliLiter(s) (50 mL/Hr) IV Continuous <Continuous>  dextrose 50% Injectable 25 Gram(s) IV Push once  dextrose 50% Injectable 12.5 Gram(s) IV Push once  dextrose 50% Injectable 25 Gram(s) IV Push once  enoxaparin Injectable 40 milliGRAM(s) SubCutaneous every 24 hours  glucagon  Injectable 1 milliGRAM(s) IntraMuscular once  insulin lispro (ADMELOG) corrective regimen sliding scale   SubCutaneous three times a day before meals  insulin lispro (ADMELOG) corrective regimen sliding scale   SubCutaneous at bedtime  levothyroxine 75 MICROGram(s) Oral daily  memantine 10 milliGRAM(s) Oral two times a day  piperacillin/tazobactam IVPB.. 3.375 Gram(s) IV Intermittent every 8 hours  sodium chloride 0.45% with potassium chloride 20 mEq/L 1000 milliLiter(s) (60 mL/Hr) IV Continuous <Continuous>    MEDICATIONS  (PRN):  acetaminophen     Tablet .. 650 milliGRAM(s) Oral every 6 hours PRN Temp greater or equal to 38C (100.4F), Mild Pain (1 - 3)  aluminum hydroxide/magnesium hydroxide/simethicone Suspension 30 milliLiter(s) Oral every 4 hours PRN Dyspepsia  dextrose Oral Gel 15 Gram(s) Oral once PRN Blood Glucose LESS THAN 70 milliGRAM(s)/deciliter  hydrALAZINE Injectable 10 milliGRAM(s) IV Push every 6 hours PRN SBP > 160  melatonin 3 milliGRAM(s) Oral at bedtime PRN Insomnia  ondansetron Injectable 4 milliGRAM(s) IV Push every 8 hours PRN Nausea and/or Vomiting      Vital Signs Last 24 Hrs  T(C): 36.4 (20 Apr 2025 07:51), Max: 37.7 (19 Apr 2025 12:15)  T(F): 97.6 (20 Apr 2025 07:51), Max: 99.8 (19 Apr 2025 12:15)  HR: 75 (20 Apr 2025 11:34) (72 - 93)  BP: 171/80 (20 Apr 2025 11:34) (129/77 - 178/94)  BP(mean): 95 (19 Apr 2025 19:45) (94 - 117)  RR: 18 (20 Apr 2025 11:34) (16 - 21)  SpO2: 96% (20 Apr 2025 11:34) (89% - 100%)    Parameters below as of 20 Apr 2025 11:34  Patient On (Oxygen Delivery Method): nasal cannula        PHYSICAL EXAM:    GENERAL: NAD, well-groomed, well-developed  HEAD:  Atraumatic, Normocephalic  EYES: EOMI, PERRLA, conjunctiva and sclera clear  ENMT: No tonsillar erythema, exudates, or enlargement; Moist mucous membranes, Good dentition, No lesions  NECK: Supple, No JVD, Normal thyroid  NERVOUS SYSTEM:  Alert & Oriented X3, Good concentration; Motor Strength 5/5 B/L upper and lower extremities; DTRs 2+ intact and symmetric  CHEST/LUNG: Clear to auscultation bilaterally; No rales, rhonchi, wheezing, or rubs  HEART: Regular rate and rhythm; No murmurs, rubs, or gallops  ABDOMEN: Soft, Nontender, Nondistended; Bowel sounds present  EXTREMITIES:  2+ Peripheral Pulses, No clubbing, cyanosis, or edema  LYMPH: No lymphadenopathy noted  SKIN: No rashes or lesions      LABS:                        10.9   8.63  )-----------( 233      ( 20 Apr 2025 06:20 )             33.8     04-20    141  |  110[H]  |  29[H]  ----------------------------<  191[H]  3.9   |  24  |  0.69    Ca    10.5[H]      20 Apr 2025 06:20  Phos  3.1     04-20  Mg     1.8     04-20    TPro  5.4[L]  /  Alb  2.2[L]  /  TBili  0.4  /  DBili  x   /  AST  82[H]  /  ALT  29  /  AlkPhos  142[H]  04-20    PT/INR - ( 19 Apr 2025 12:22 )   PT: 14.1 sec;   INR: 1.20 ratio         PTT - ( 19 Apr 2025 12:22 )  PTT:34.7 sec  Urinalysis Basic - ( 20 Apr 2025 06:20 )    Color: x / Appearance: x / SG: x / pH: x  Gluc: 191 mg/dL / Ketone: x  / Bili: x / Urobili: x   Blood: x / Protein: x / Nitrite: x   Leuk Esterase: x / RBC: x / WBC x   Sq Epi: x / Non Sq Epi: x / Bacteria: x          RADIOLOGY & ADDITIONAL STUDIES:       REASON FOR CONSULTATION:     HPI:  84-year-old female with a past medical history of metastatic breast cancer (currently receiving radiation therapy), type 2 diabetes mellitus, colon cancer status post right hemicolectomy in 2017, HTN, hypothyroidism, and advanced dementia (nonverbal) presents with generalized weakness. HPI is limited due to advanced dementia. Daughter at bedside provides collateral history, reporting that the patient has metastatic breast cancer and has received radiation in the past. She is currently being followed by Dr. Riddle as an outpatient for monitoring. The patient has advanced dementia and is completely dependent on her daughter for care. Over the past two weeks, the patient has experienced a decline in her health. As per the daughter, the patient was ambulatory two weeks ago but has since become more bedbound and lethargic. The patient was found to have an elevated WBC count at a primary care provider visit three days ago and was empirically started on ciprofloxacin without symptom improvement. Today, the patient was noted to have hypoxia to 80% at home by her daughter and was brought to the emergency department for further evaluation. ROS not obtainable due to the patient’s mentation.    In ED BP elevated 173/101, SaO2 99 on 2L NC, VSS otherwise.   CBC: WBC 9, H/H 12/37, Plt 250  CMP: BUN/Cr 31/0.7, Glu 234, Alk phos 172,   Flu/COVID: negative.   UA: Cloudy, proteinuria, ketonuria, large blood, 10 wbcs, few bacteria.  CTA chest & CT abd/pelvis: No acute pulmonary embolus. New pulmonary and pleural nodules, compatible with progression of metastatic disease. Progression of osteolytic metastasis, with pathologic fractures of the right 10th and left ninth ribs. Redemonstrated severe right hydronephrosis secondary to an 8 mm stone in the distal right ureter. Unchanged bulky retroperitoneal adenopathy. Unchanged masslike soft tissue in the right breast. Resolution of previously seen right subpectoral and axillary adenopathy. (19 Apr 2025 18:06)      REVIEW OF SYSTEMS:  Constitutional, Eyes, ENT, Cardiovascular, Respiratory, Gastrointestinal, Genitourinary, Musculoskeletal, Integumentary, Neurological, Psychiatric, Endocrine, Heme/Lymph, and Allergic/Immunologic review of systems are otherwise negative except as noted in the HPI.    PAST MEDICAL & SURGICAL HISTORY:  DM (diabetes mellitus)      Hypothyroidism      Dementia      HLD (hyperlipidemia)      HTN (hypertension)      Breast cancer          FAMILY HISTORY:      SOCIAL HISTORY:    Allergies    No Known Allergies    Intolerances        MEDICATIONS  (STANDING):  atorvastatin 10 milliGRAM(s) Oral at bedtime  dextrose 5%. 1000 milliLiter(s) (100 mL/Hr) IV Continuous <Continuous>  dextrose 5%. 1000 milliLiter(s) (50 mL/Hr) IV Continuous <Continuous>  dextrose 50% Injectable 25 Gram(s) IV Push once  dextrose 50% Injectable 12.5 Gram(s) IV Push once  dextrose 50% Injectable 25 Gram(s) IV Push once  enoxaparin Injectable 40 milliGRAM(s) SubCutaneous every 24 hours  glucagon  Injectable 1 milliGRAM(s) IntraMuscular once  insulin lispro (ADMELOG) corrective regimen sliding scale   SubCutaneous three times a day before meals  insulin lispro (ADMELOG) corrective regimen sliding scale   SubCutaneous at bedtime  levothyroxine 75 MICROGram(s) Oral daily  memantine 10 milliGRAM(s) Oral two times a day  piperacillin/tazobactam IVPB.. 3.375 Gram(s) IV Intermittent every 8 hours  sodium chloride 0.45% with potassium chloride 20 mEq/L 1000 milliLiter(s) (60 mL/Hr) IV Continuous <Continuous>    MEDICATIONS  (PRN):  acetaminophen     Tablet .. 650 milliGRAM(s) Oral every 6 hours PRN Temp greater or equal to 38C (100.4F), Mild Pain (1 - 3)  aluminum hydroxide/magnesium hydroxide/simethicone Suspension 30 milliLiter(s) Oral every 4 hours PRN Dyspepsia  dextrose Oral Gel 15 Gram(s) Oral once PRN Blood Glucose LESS THAN 70 milliGRAM(s)/deciliter  hydrALAZINE Injectable 10 milliGRAM(s) IV Push every 6 hours PRN SBP > 160  melatonin 3 milliGRAM(s) Oral at bedtime PRN Insomnia  ondansetron Injectable 4 milliGRAM(s) IV Push every 8 hours PRN Nausea and/or Vomiting      Vital Signs Last 24 Hrs  T(C): 36.4 (20 Apr 2025 07:51), Max: 37.7 (19 Apr 2025 12:15)  T(F): 97.6 (20 Apr 2025 07:51), Max: 99.8 (19 Apr 2025 12:15)  HR: 75 (20 Apr 2025 11:34) (72 - 93)  BP: 171/80 (20 Apr 2025 11:34) (129/77 - 178/94)  BP(mean): 95 (19 Apr 2025 19:45) (94 - 117)  RR: 18 (20 Apr 2025 11:34) (16 - 21)  SpO2: 96% (20 Apr 2025 11:34) (89% - 100%)    Parameters below as of 20 Apr 2025 11:34  Patient On (Oxygen Delivery Method): nasal cannula        PHYSICAL EXAM:    GENERAL: NAD,   HEAD:  Atraumatic, Normocephalic  EYES: EOMI, PERRLA,  NECK: Supple,  NERVOUS SYSTEM:  Alert & Awake  ABDOMEN: Soft, Nontender,   EXTREMITIES: no edema  LYMPH: No lymphadenopathy noted  SKIN: No rashes or lesions      LABS:                        10.9   8.63  )-----------( 233      ( 20 Apr 2025 06:20 )             33.8     04-20    141  |  110[H]  |  29[H]  ----------------------------<  191[H]  3.9   |  24  |  0.69    Ca    10.5[H]      20 Apr 2025 06:20  Phos  3.1     04-20  Mg     1.8     04-20    TPro  5.4[L]  /  Alb  2.2[L]  /  TBili  0.4  /  DBili  x   /  AST  82[H]  /  ALT  29  /  AlkPhos  142[H]  04-20    PT/INR - ( 19 Apr 2025 12:22 )   PT: 14.1 sec;   INR: 1.20 ratio         PTT - ( 19 Apr 2025 12:22 )  PTT:34.7 sec  Urinalysis Basic - ( 20 Apr 2025 06:20 )    Color: x / Appearance: x / SG: x / pH: x  Gluc: 191 mg/dL / Ketone: x  / Bili: x / Urobili: x   Blood: x / Protein: x / Nitrite: x   Leuk Esterase: x / RBC: x / WBC x   Sq Epi: x / Non Sq Epi: x / Bacteria: x          RADIOLOGY & ADDITIONAL STUDIES:

## 2025-04-21 LAB
24R-OH-CALCIDIOL SERPL-MCNC: 30.6 NG/ML — SIGNIFICANT CHANGE UP
ALBUMIN SERPL ELPH-MCNC: 2.2 G/DL — LOW (ref 3.3–5)
ALP SERPL-CCNC: 131 U/L — HIGH (ref 40–120)
ALT FLD-CCNC: 25 U/L — SIGNIFICANT CHANGE UP (ref 12–78)
AMMONIA BLD-MCNC: 41 UMOL/L — HIGH (ref 11–32)
ANION GAP SERPL CALC-SCNC: 5 MMOL/L — SIGNIFICANT CHANGE UP (ref 5–17)
AST SERPL-CCNC: 83 U/L — HIGH (ref 15–37)
BASE EXCESS BLDV CALC-SCNC: 1 MMOL/L — SIGNIFICANT CHANGE UP (ref -2–3)
BASOPHILS # BLD AUTO: 0.04 K/UL — SIGNIFICANT CHANGE UP (ref 0–0.2)
BASOPHILS NFR BLD AUTO: 0.5 % — SIGNIFICANT CHANGE UP (ref 0–2)
BILIRUB DIRECT SERPL-MCNC: 0.1 MG/DL — SIGNIFICANT CHANGE UP (ref 0–0.3)
BILIRUB INDIRECT FLD-MCNC: 0.4 MG/DL — SIGNIFICANT CHANGE UP (ref 0.2–1)
BILIRUB SERPL-MCNC: 0.5 MG/DL — SIGNIFICANT CHANGE UP (ref 0.2–1.2)
BUN SERPL-MCNC: 28 MG/DL — HIGH (ref 7–23)
CA-I BLD-SCNC: 1.44 MMOL/L — HIGH (ref 1.15–1.33)
CALCIUM SERPL-MCNC: 10.4 MG/DL — HIGH (ref 8.5–10.1)
CHLORIDE SERPL-SCNC: 109 MMOL/L — HIGH (ref 96–108)
CHOLEST SERPL-MCNC: 145 MG/DL — SIGNIFICANT CHANGE UP
CK SERPL-CCNC: 55 U/L — SIGNIFICANT CHANGE UP (ref 26–192)
CO2 SERPL-SCNC: 27 MMOL/L — SIGNIFICANT CHANGE UP (ref 22–31)
CREAT SERPL-MCNC: 0.67 MG/DL — SIGNIFICANT CHANGE UP (ref 0.5–1.3)
CRP SERPL-MCNC: 33.6 MG/L — HIGH (ref 0–5)
EGFR: 85 ML/MIN/1.73M2 — SIGNIFICANT CHANGE UP
EGFR: 85 ML/MIN/1.73M2 — SIGNIFICANT CHANGE UP
EOSINOPHIL # BLD AUTO: 0.3 K/UL — SIGNIFICANT CHANGE UP (ref 0–0.5)
EOSINOPHIL NFR BLD AUTO: 4 % — SIGNIFICANT CHANGE UP (ref 0–6)
ERYTHROCYTE [SEDIMENTATION RATE] IN BLOOD: 8 MM/HR — SIGNIFICANT CHANGE UP (ref 0–20)
FERRITIN SERPL-MCNC: 691 NG/ML — HIGH (ref 13–330)
FOLATE SERPL-MCNC: 5.6 NG/ML — SIGNIFICANT CHANGE UP
GLUCOSE BLDC GLUCOMTR-MCNC: 154 MG/DL — HIGH (ref 70–99)
GLUCOSE BLDC GLUCOMTR-MCNC: 162 MG/DL — HIGH (ref 70–99)
GLUCOSE BLDC GLUCOMTR-MCNC: 175 MG/DL — HIGH (ref 70–99)
GLUCOSE BLDC GLUCOMTR-MCNC: 207 MG/DL — HIGH (ref 70–99)
GLUCOSE BLDC GLUCOMTR-MCNC: 305 MG/DL — HIGH (ref 70–99)
GLUCOSE SERPL-MCNC: 156 MG/DL — HIGH (ref 70–99)
HCO3 BLDV-SCNC: 26 MMOL/L — SIGNIFICANT CHANGE UP (ref 22–29)
HCT VFR BLD CALC: 33.4 % — LOW (ref 34.5–45)
HDLC SERPL-MCNC: 39 MG/DL — LOW
HGB BLD-MCNC: 10.7 G/DL — LOW (ref 11.5–15.5)
IMM GRANULOCYTES # BLD AUTO: 0.12 K/UL — HIGH (ref 0–0.07)
IMM GRANULOCYTES NFR BLD AUTO: 1.6 % — HIGH (ref 0–0.9)
IRON SATN MFR SERPL: 18 % — SIGNIFICANT CHANGE UP (ref 14–50)
IRON SATN MFR SERPL: 36 UG/DL — SIGNIFICANT CHANGE UP (ref 30–160)
LDLC SERPL-MCNC: 71 MG/DL — SIGNIFICANT CHANGE UP
LIDOCAIN IGE QN: 16 U/L — SIGNIFICANT CHANGE UP (ref 13–75)
LIPID PNL WITH DIRECT LDL SERPL: 71 MG/DL — SIGNIFICANT CHANGE UP
LYMPHOCYTES # BLD AUTO: 1.43 K/UL — SIGNIFICANT CHANGE UP (ref 1–3.3)
LYMPHOCYTES NFR BLD AUTO: 19.1 % — SIGNIFICANT CHANGE UP (ref 13–44)
MAGNESIUM SERPL-MCNC: 1.8 MG/DL — SIGNIFICANT CHANGE UP (ref 1.6–2.6)
MCHC RBC-ENTMCNC: 27.5 PG — SIGNIFICANT CHANGE UP (ref 27–34)
MCHC RBC-ENTMCNC: 32 G/DL — SIGNIFICANT CHANGE UP (ref 32–36)
MCV RBC AUTO: 85.9 FL — SIGNIFICANT CHANGE UP (ref 80–100)
MONOCYTES # BLD AUTO: 0.54 K/UL — SIGNIFICANT CHANGE UP (ref 0–0.9)
MONOCYTES NFR BLD AUTO: 7.2 % — SIGNIFICANT CHANGE UP (ref 2–14)
NEUTROPHILS # BLD AUTO: 5.05 K/UL — SIGNIFICANT CHANGE UP (ref 1.8–7.4)
NEUTROPHILS NFR BLD AUTO: 67.6 % — SIGNIFICANT CHANGE UP (ref 43–77)
NONHDLC SERPL-MCNC: 106 MG/DL — SIGNIFICANT CHANGE UP
NRBC # BLD AUTO: 0 K/UL — SIGNIFICANT CHANGE UP (ref 0–0)
NRBC # FLD: 0 K/UL — SIGNIFICANT CHANGE UP (ref 0–0)
NRBC BLD AUTO-RTO: 0 /100 WBCS — SIGNIFICANT CHANGE UP (ref 0–0)
NT-PROBNP SERPL-SCNC: 1276 PG/ML — HIGH (ref 0–450)
PCO2 BLDV: 42 MMHG — SIGNIFICANT CHANGE UP (ref 39–42)
PH BLDV: 7.4 — SIGNIFICANT CHANGE UP (ref 7.32–7.43)
PHOSPHATE SERPL-MCNC: 3.3 MG/DL — SIGNIFICANT CHANGE UP (ref 2.5–4.5)
PLATELET # BLD AUTO: 225 K/UL — SIGNIFICANT CHANGE UP (ref 150–400)
PMV BLD: 9.1 FL — SIGNIFICANT CHANGE UP (ref 7–13)
PO2 BLDV: 214 MMHG — HIGH (ref 25–45)
POTASSIUM SERPL-MCNC: 4.1 MMOL/L — SIGNIFICANT CHANGE UP (ref 3.5–5.3)
POTASSIUM SERPL-SCNC: 4.1 MMOL/L — SIGNIFICANT CHANGE UP (ref 3.5–5.3)
PROCALCITONIN SERPL-MCNC: 0.14 NG/ML — HIGH (ref 0.02–0.1)
PROT SERPL-MCNC: 5 GM/DL — LOW (ref 6–8.3)
RBC # BLD: 3.89 M/UL — SIGNIFICANT CHANGE UP (ref 3.8–5.2)
RBC # FLD: 14.8 % — HIGH (ref 10.3–14.5)
SAO2 % BLDV: 100 % — HIGH (ref 67–88)
SODIUM SERPL-SCNC: 141 MMOL/L — SIGNIFICANT CHANGE UP (ref 135–145)
T4 FREE SERPL-MCNC: 1.31 NG/DL — SIGNIFICANT CHANGE UP (ref 0.76–1.46)
TIBC SERPL-MCNC: 206 UG/DL — LOW (ref 220–430)
TRIGL SERPL-MCNC: 210 MG/DL — HIGH
TROPONIN I, HIGH SENSITIVITY RESULT: 13.05 NG/L — SIGNIFICANT CHANGE UP
TSH SERPL-MCNC: 1.3 UU/ML — SIGNIFICANT CHANGE UP (ref 0.34–4.82)
UIBC SERPL-MCNC: 170 UG/DL — SIGNIFICANT CHANGE UP (ref 110–370)
URATE SERPL-MCNC: 3.9 MG/DL — SIGNIFICANT CHANGE UP (ref 2.5–7)
VIT B12 SERPL-MCNC: >2000 PG/ML — HIGH (ref 232–1245)
WBC # BLD: 7.48 K/UL — SIGNIFICANT CHANGE UP (ref 3.8–10.5)
WBC # FLD AUTO: 7.48 K/UL — SIGNIFICANT CHANGE UP (ref 3.8–10.5)

## 2025-04-21 PROCEDURE — 99233 SBSQ HOSP IP/OBS HIGH 50: CPT

## 2025-04-21 PROCEDURE — 99223 1ST HOSP IP/OBS HIGH 75: CPT

## 2025-04-21 RX ORDER — LACTULOSE 10 G/15ML
10 SOLUTION ORAL DAILY
Refills: 0 | Status: DISCONTINUED | OUTPATIENT
Start: 2025-04-21 | End: 2025-04-24

## 2025-04-21 RX ORDER — ATORVASTATIN CALCIUM 80 MG/1
10 TABLET, FILM COATED ORAL AT BEDTIME
Refills: 0 | Status: DISCONTINUED | OUTPATIENT
Start: 2025-04-21 | End: 2025-04-23

## 2025-04-21 RX ORDER — TAMSULOSIN HYDROCHLORIDE 0.4 MG/1
0.4 CAPSULE ORAL AT BEDTIME
Refills: 0 | Status: DISCONTINUED | OUTPATIENT
Start: 2025-04-21 | End: 2025-04-24

## 2025-04-21 RX ADMIN — TAMSULOSIN HYDROCHLORIDE 0.4 MILLIGRAM(S): 0.4 CAPSULE ORAL at 22:11

## 2025-04-21 RX ADMIN — ENOXAPARIN SODIUM 40 MILLIGRAM(S): 100 INJECTION SUBCUTANEOUS at 17:40

## 2025-04-21 RX ADMIN — Medication 650 MILLIGRAM(S): at 05:51

## 2025-04-21 RX ADMIN — Medication 25 GRAM(S): at 05:57

## 2025-04-21 RX ADMIN — ATORVASTATIN CALCIUM 10 MILLIGRAM(S): 80 TABLET, FILM COATED ORAL at 22:11

## 2025-04-21 RX ADMIN — Medication 80 MILLIGRAM(S): at 09:13

## 2025-04-21 RX ADMIN — Medication 10 MILLIGRAM(S): at 22:11

## 2025-04-21 RX ADMIN — Medication 5 MILLIGRAM(S): at 22:11

## 2025-04-21 RX ADMIN — Medication 25 GRAM(S): at 13:52

## 2025-04-21 RX ADMIN — INSULIN LISPRO 4: 100 INJECTION, SOLUTION INTRAVENOUS; SUBCUTANEOUS at 17:39

## 2025-04-21 RX ADMIN — LACTULOSE 10 GRAM(S): 10 SOLUTION ORAL at 13:00

## 2025-04-21 RX ADMIN — INSULIN LISPRO 8: 100 INJECTION, SOLUTION INTRAVENOUS; SUBCUTANEOUS at 13:00

## 2025-04-21 RX ADMIN — INSULIN LISPRO 2: 100 INJECTION, SOLUTION INTRAVENOUS; SUBCUTANEOUS at 09:12

## 2025-04-21 RX ADMIN — Medication 75 MICROGRAM(S): at 05:51

## 2025-04-21 NOTE — PROGRESS NOTE ADULT - ASSESSMENT
· 83 y/o female with PMH of metastatic breast cancer triple negative,  (currently receiving radiation therapy), h/o colon cancer status post right hemicolectomy in 2017, advanced vascular dementia, HTN, hypothyroidism, DM2 diet controlled  admitted on 4/19 for  generalized weakness. HPI is limited due to advanced dementia. Daughter at bedside provides collateral history. Over the past two weeks, the patient has experienced a decline in her health. As per the daughter, the patient was ambulatory two weeks ago but has since become more bedbound and lethargic. The patient was found to have an elevated WBC count at a primary care provider visit three days ago and was empirically started on ciprofloxacin without symptom improvement. Today, the patient was noted to have hypoxia to 80% at home by her daughter and was brought to the emergency department for further evaluation. ROS not obtainable due to the patient’s mentation.      # Metabolic encephalopathy , multifactorial r/o CVA, brain mets vs progression of dementia  # Acute hypoxic respiratory failure , Pulmonary nodules, pleural nodules due to metastatic disease ruled out PE   # Pathologic 9 th L and 10 th  R rib fractures with bone metastasis  # Hypercalcemia  # Pyuria, probable partially treated UTI   # Severe right sided hydronephrosis, retroperitoneal LAD  # Nephrolithiasis   # Metastatic triple negative breast cancer with mets to bones, lungs, LAD  - CTA chest & CT abd/pelvis: no PE, +  New pulmonary and pleural nodules, compatible with progression of metastatic disease. Progression of osteolytic metastasis, with pathologic fractures of the right 10th and left ninth ribs. Redemonstrated severe right hydronephrosis secondary to an 8 mm stone in the distal right ureter. Unchanged bulky retroperitoneal adenopathy. Unchanged masslike soft tissue in the right breast. Resolution of previously seen right subpectoral and axillary adenopathy.   - CT head  - moderate to severe cerebrovascular disease   - MRI head - pending   - check ammonia 41   - , TSH, vit B12 wnl , folate 5.6 , CRP 33, procalcitonin , free Ca, PTH noted   - O2 supplementation   - IV fluids for hypercalcemia  - s/p  Zosyn - will stop 4/21 cultures neg   - hold memantine - can cause sedation  - neurology consult - MRI   - Urology  consult - no need for stent or nephrostomy     # Right sided abdominal pain, constipation  # Liver lesions on CT, GB polyp   # Transaminitis, improving   - repeat liver function tests  - check lipase wnl, CK wnl  - restart statin   - check CK, lipid profile LDL 71  - bowel regiment      # Metastatic breast cancer   - Heme/onc consult - hospice appropriate   -Palliative consult. for GOC     #  DM (diabetes mellitus).  diet controlled  - diet purreed with supplements   -  mod-ISF with night time coverage.   - Hypoglycemia protocol      #  Hypothyroidism.   - c/w  Levothyroxine. check TSH, free T4      # HTN (hypertension).   - s/p IV hydralazine  - lower dose of valsartan 80 chantelle     #  HLD (hyperlipidemia).   - restart statin  - repeat hepatic function tests in am    #  Dementia. vascular  # Dysphagia  # RUE tremor   -  Fall and aspiration precautions.  - speech and swallow eval. - pureed diet      DVT ppx: lovenox  Turn and reposition q2h to prevent bedsores    Dispo - PT daily, MRI , d/c planning, GOC as per palliative

## 2025-04-21 NOTE — CONSULT NOTE ADULT - ASSESSMENT
83 y/o woman PMH of metastatic breast cancer, h/o colon cancer, advanced vascular dementia, HTN, hypothyroidism, DM-2; admitted on 4/19 for  weakness, w/u shows acute hypoxic respiratory failure , new pulmonary nodules, CT head no acute changes. On exam evidence of cognitive disorder, R VFC, R hemiparesis. R/o new L CVA, R/o metastatic disease. MRI head pending.  Suggest:  f/u MR head w/wo  EEG  Oncology f/u  DVT prophylaxis

## 2025-04-21 NOTE — PROGRESS NOTE ADULT - SUBJECTIVE AND OBJECTIVE BOX
Subjective:  Chief complain : lethargy , minimally responsive, poor po intake   HPI:  83 y/o female with PMH of metastatic breast cancer triple negative,  (currently receiving radiation therapy), h/o colon cancer status post right hemicolectomy in 2017, advanced vascular dementia, HTN, hypothyroidism, DM2 diet controlled  admitted on 4/19 for  generalized weakness. HPI is limited due to advanced dementia. Daughter at bedside provides collateral history. Over the past two weeks, the patient has experienced a decline in her health. As per the daughter, the patient was ambulatory two weeks ago but has since become more bedbound and lethargic. The patient was found to have an elevated WBC count at a primary care provider visit three days ago and was empirically started on ciprofloxacin without symptom improvement. Today, the patient was noted to have hypoxia to 80% at home by her daughter and was brought to the emergency department for further evaluation. ROS not obtainable due to the patient’s mentation.      In ED BP elevated 173/101, SaO2 99 on 2L NC, VSS otherwise.  CBC: WBC 9, H/H 12/37, Plt 250 CMP: BUN/Cr 31/0.7, Glu 234, Alk phos 172,  Flu/COVID: negative.   UA: Cloudy, proteinuria, ketonuria, large blood, 10 wbcs, few bacteria. CTA chest & CT abd/pelvis: No acute pulmonary embolus. New pulmonary and pleural nodules, compatible with progression of metastatic disease. Progression of osteolytic metastasis, with pathologic fractures of the right 10th and left ninth ribs. Redemonstrated severe right hydronephrosis secondary to an 8 mm stone in the distal right ureter. Unchanged bulky retroperitoneal adenopathy. Unchanged masslike soft tissue in the right breast. Resolution of previously seen right subpectoral and axillary adenopathy.     4/20 -  Patient seen and examined at bedside earlier today, daughter at bedside provides history , pt non-verbal, unable to follow  commands, opens the eyes, not using right arm, + right arm tremor noted, unable to move legs is asked , afebrile,  plan explained at length  4/21 - pt seen and examined , more verbal today, participated with PT, denies pain, poor historian, afebrile    Review of system-  unable to obtain due to mental status      Vital sings reviewed for last 24 h  T(C): 37 (04-21-25 @ 15:47), Max: 37 (04-21-25 @ 15:47)  T(F): 98.6 (04-21-25 @ 15:47), Max: 98.6 (04-21-25 @ 15:47)  HR: 77 (04-21-25 @ 15:47) (63 - 77)  BP: 160/75 (04-21-25 @ 15:47) (139/69 - 160/75)  RR: 17 (04-21-25 @ 15:47) (17 - 17)  SpO2: 94% (04-21-25 @ 15:47) (94% - 100%)  CAPILLARY BLOOD GLUCOSE  POCT Blood Glucose.: 207 mg/dL (21 Apr 2025 17:30)  POCT Blood Glucose.: 305 mg/dL (21 Apr 2025 12:36)  POCT Blood Glucose.: 162 mg/dL (21 Apr 2025 09:10)  POCT Blood Glucose.: 154 mg/dL (21 Apr 2025 07:58)  POCT Blood Glucose.: 160 mg/dL (20 Apr 2025 22:13)        Physical exam:   General : NAD, appear to be of stated age , well groomed , awake, alert   NERVOUS SYSTEM:  Alert & Oriented X0, opens eyes,  following commands, limited exam, not using RUE, + RUE resting tremor, unable to test sensation and gait  HEAD:  Atraumatic, Normocephalic  EYES: EOMI, PERRLA, conjunctiva and sclera clear  HEENT: dry  mucous membranes, Supple neck , No JVD  CHEST: BS decrease at bases bilaterally; No rales, no rhonchi, no wheezing  HEART: Regular rate and rhythm; No murmurs, no rubs or gallops  ABDOMEN: Soft, RUQ , RLQ tenderness,  Non-distended; Bowel sounds present, no guarding , no peritoneal irritation   GENITOURINARY- Voiding, no suprapubic tenderness  EXTREMITIES:  2+ Peripheral Pulses, No clubbing, cyanosis,   edema  MUSCULOSKELETAL:- No muscle tenderness, Muscle tone normal, No joint tenderness, no Joint swelling,  Joint ROM –normal   SKIN-no rash, no lesion , + right breast hard mass on palpation    Labs radiologic and other test : all reviewed and interpreted :                           10.7   7.48  )-----------( 225      ( 21 Apr 2025 06:45 )             33.4     04-21    141  |  109[H]  |  28[H]  ----------------------------<  156[H]  4.1   |  27  |  0.67    Ca    10.4[H]      21 Apr 2025 06:45  Phos  3.3     04-21  Mg     1.8     04-21    TPro  5.0[L]  /  Alb  2.2[L]  /  TBili  0.5  /  DBili  0.1  /  AST  83[H]  /  ALT  25  /  AlkPhos  131[H]  04-21        LIVER FUNCTIONS - ( 21 Apr 2025 06:45 )  Alb: 2.2 g/dL / Pro: 5.0 gm/dL / ALK PHOS: 131 U/L / ALT: 25 U/L / AST: 83 U/L / GGT: x                 < from: CT Head No Cont (04.20.25 @ 15:43) >  IMPRESSION: No acute intracranial hemorrhage, mass effect, or shift of   the midline structures.    Moderate severity chronic white matter microvascular type changes.      < end of copied text >                 CT Abdomen and Pelvis w/ IV Cont (04.19.25 @ 13:40) >    IMPRESSION:    1. No acute pulmonary embolus.  2. New pulmonary and pleural nodules, compatible with progression of   metastatic disease.  3. Progression of osteolytic metastasis, with pathologic fractures of the   right 10th and left ninth ribs.  4. Redemonstrated severe right hydronephrosis secondary to an 8 mm stone   in the distal right ureter.  5. Unchanged bulky retroperitoneal adenopathy.  6. Unchanged masslike soft tissue in the right breast.  7. Resolution of previously seen right subpectoral and axillary   adenopathy.          RECENT CULTURES:    Urine Microscopic-Add On (NC) (04.19.25 @ 12:50)    White Blood Cell - Urine: 10 /HPF   Red Blood Cell - Urine: 1 /HPF   Bacteria: Few /HPF   Cast: 1 /LPF   Hyaline Casts: None Seen   Epithelial Cells: 2 /HPF   Calcium Oxalate Crystals: Present   Comment - Urine: Few mucus strands        Culture - Urine (04.19.25 @ 12:50)    Specimen Source: Urine None   Culture Results:   No growth      Cardiac testing : reviewed   EKG     12 Lead ECG (04.19.25 @ 14:46) >  Ventricular Rate 79 BPM  QTC Calculation(Bazett) 428 ms   Normal sinus rhythm  Nonspecific T wave abnormality  Abnormal ECG  No previous ECGs available        Procedures :     Devices:     Current medications:  acetaminophen     Tablet .. 650 milliGRAM(s) Oral every 6 hours PRN  aluminum hydroxide/magnesium hydroxide/simethicone Suspension 30 milliLiter(s) Oral every 4 hours PRN  atorvastatin 10 milliGRAM(s) Oral at bedtime  dextrose 5%. 1000 milliLiter(s) IV Continuous <Continuous>  dextrose 5%. 1000 milliLiter(s) IV Continuous <Continuous>  dextrose 50% Injectable 25 Gram(s) IV Push once  dextrose 50% Injectable 12.5 Gram(s) IV Push once  dextrose 50% Injectable 25 Gram(s) IV Push once  dextrose Oral Gel 15 Gram(s) Oral once PRN  enoxaparin Injectable 40 milliGRAM(s) SubCutaneous every 24 hours  glucagon  Injectable 1 milliGRAM(s) IntraMuscular once  hydrALAZINE Injectable 10 milliGRAM(s) IV Push every 6 hours PRN  insulin lispro (ADMELOG) corrective regimen sliding scale   SubCutaneous three times a day before meals  insulin lispro (ADMELOG) corrective regimen sliding scale   SubCutaneous at bedtime  levothyroxine 75 MICROGram(s) Oral daily  melatonin 3 milliGRAM(s) Oral at bedtime PRN  memantine 10 milliGRAM(s) Oral two times a day  ondansetron Injectable 4 milliGRAM(s) IV Push every 8 hours PRN  piperacillin/tazobactam IVPB.. 3.375 Gram(s) IV Intermittent every 8 hours  sodium chloride 0.45% with potassium chloride 20 mEq/L 1000 milliLiter(s) IV Continuous <Continuous>

## 2025-04-21 NOTE — PROGRESS NOTE ADULT - SUBJECTIVE AND OBJECTIVE BOX
Patient seen at bedside. Patient is a poor historian at baseline. Pt's daughter at bedside reports they have known about her obstructing right ureteral stone for sometime and have been following Dr. Khan outpatient. She denies any fevers, chills, vomiting in patient. She reports pt appears comfortable and has some discomfort when she is moved due to her rib fractures.    PE  VITALS  T(C): 36.4 (04-21-25 @ 08:44), Max: 37.5 (04-20-25 @ 16:05)  HR: 63 (04-21-25 @ 08:44) (63 - 89)  BP: 159/87 (04-21-25 @ 08:44) (138/80 - 171/80)  RR: 17 (04-21-25 @ 08:44) (17 - 18)  SpO2: 100% (04-21-25 @ 08:44) (95% - 100%)            Skin     : No jaundice  Lung    : No resp distress  Abdo:   : Soft, No grimacing on palpation, No guarding, No distension   Back    : No CVAT b/l- no grimacing on exam  Genitalia Female: No Teague    LABS                          10.7   7.48  )-----------( 225      ( 21 Apr 2025 06:45 )             33.4   04-21    141  |  109[H]  |  28[H]  ----------------------------<  156[H]  4.1   |  27  |  0.67    Ca    10.4[H]      21 Apr 2025 06:45  Phos  3.3     04-21  Mg     1.8     04-21    TPro  5.0[L]  /  Alb  2.2[L]  /  TBili  0.5  /  DBili  0.1  /  AST  83[H]  /  ALT  25  /  AlkPhos  131[H]  04-21

## 2025-04-21 NOTE — PHYSICAL THERAPY INITIAL EVALUATION ADULT - ADDITIONAL COMMENTS
@ 2 wks ago, as per family, pt was amb without AD but with sup, able to access stairs in home, Community amb without AD. Deterioration noted from walker, rollater to wc usage.

## 2025-04-21 NOTE — PROGRESS NOTE ADULT - ASSESSMENT
83 yo F hx of breast ca with metastatic disease presenting with failure to thrive and hypoxia with incidental CT findings of new severe hydronephrosis with 8mm R ureteral stone. WBC 9, Cr .70. UCx negative.   As per pt's daughter pt with known ureteral stone. Pt currently afebrile, VS stable. Creat wnl.  Discussed cystoscopy, ureteral stent placement with patient, risks, benefits and alternatives which include nephrostomy tube or conservative observation/ medical expulsive therapy. Given pt's UCx neg and pt is comfortable pt's daughter would like to proceed with conservative management and medical expulsive tx at this time. She will plan on following up with Dr. Khan outpatient once patient is discharged to discuss definitive stone management.   Recommend  - Flomax 0.4 mg QHS  - Strain urine  - Patient will follow up outpatient for further stone management  - Educated and advised patient's daughter to return to the ER if pt develops fevers, chills or worsening symptoms as pt may need urgent stent placement vs. PCNT. Pt's daughter verbalized understanding.     Case discussed with Dr. Khan

## 2025-04-21 NOTE — PHYSICAL THERAPY INITIAL EVALUATION ADULT - IMPAIRMENTS CONTRIBUTING IMPAIRED BED MOBILITY, REHAB EVAL
able to sit upright at EOB with min assist/CGA for a few min and increased verbal cues. PT observed bleeding from IV site and nsg called to address/cognition/decreased strength

## 2025-04-21 NOTE — CONSULT NOTE ADULT - CONVERSATION DETAILS
The role of Palliative medicine was reviewed with the pt's daughter at bedside. She has noticed a significant decline in her mother's functional status over the past few weeks. The pt is non verbal and unable to communicate. She has been receiving radiation tx and has been under the care of Dr Franz for breast cancer. The pt is a retired RN and her daughter knows she would not want extraordinary life sustaining measures without a quality of life. MOLST DNR/DNI/Trial NIV on chart. She would like to complete the medical work up before making any decisions. She will return home with Summer and family.

## 2025-04-21 NOTE — CONSULT NOTE ADULT - ASSESSMENT
84-year-old female with a past medical history of metastatic breast cancer (currently receiving radiation therapy), type 2 diabetes mellitus, colon cancer status post right hemicolectomy in 2017, HTN, hypothyroidism, and advanced dementia (nonverbal) presents with generalized weakness. HPI is limited due to advanced dementia. Daughter at bedside provides collateral history, reporting that the patient has metastatic breast cancer and has received radiation in the past. She is currently being followed by Dr. Riddle as an outpatient for monitoring. As per the daughter, the patient was ambulatory two weeks ago but has since become more bedbound and lethargic. The patient was found to have an elevated WBC count at a primary care provider visit three days ago and was empirically started on ciprofloxacin without symptom improvement. Today, the patient was noted to have hypoxia to 80% at home by her daughter and was brought to the emergency department for further evaluation. Palliative Consult to further establish GOC  4/21/25 Seen and examined at bedside. Non verbal although awake and eating breakfast as fed by daughter.    Assessment and Plan:    1) AMS  -Dementia  -? baseline  -CT head no acute process  -MRI head - pending   -O2 supplementation       2) Breast cancer  -Right sided abdominal pain, constipation  -Liver lesions on CT, GB polyp   -Pulm mets/Bone mets  -CT chest noted  -Heme/onc consult      3) Dementia  - Dysphagia  - RUE tremor   -Fall and aspiration precautions.  -speech and swallow eval. - pureed diet     4) Advanced Directives  -Pt lacks capacity  -GOC done with daughter Summer AguiarYOVANI DNR/DNI/TrailNIV on chart    Time Spent: 75 minutes including the care, coordination and counseling of this patient, 50% of which was spent coordinating and counseling.

## 2025-04-21 NOTE — CONSULT NOTE ADULT - SUBJECTIVE AND OBJECTIVE BOX
84-year-old female with a past medical history of metastatic breast cancer (currently receiving radiation therapy), type 2 diabetes mellitus, colon cancer status post right hemicolectomy in 2017, HTN, hypothyroidism, and advanced dementia (nonverbal) presents with generalized weakness. HPI is limited due to advanced dementia. Daughter at bedside provides collateral history, reporting that the patient has metastatic breast cancer and has received radiation in the past. She is currently being followed by Dr. Riddle as an outpatient for monitoring. As per the daughter, the patient was ambulatory two weeks ago but has since become more bedbound and lethargic. The patient was found to have an elevated WBC count at a primary care provider visit three days ago and was empirically started on ciprofloxacin without symptom improvement. Today, the patient was noted to have hypoxia to 80% at home by her daughter and was brought to the emergency department for further evaluation. Palliative Consult to further establish GOC  4/21/25 Seen and examined at bedside. Non verbal although awake and eating breakfast as fed by daughter.      PAIN: ( )Yes   (X )No  No non verbal signs or symptoms    DYSPNEA: ( ) Yes  ( X) No      PAST MEDICAL & SURGICAL HISTORY:  DM (diabetes mellitus)  Hypothyroidism  Dementia  HLD (hyperlipidemia)  HTN (hypertension)  Breast cancer    SOCIAL HX:  Lives with daughter  Hx opiate tolerance ( )YES  (X )NO    Baseline ADLs  (Prior to Admission)  ( ) Independent   ( X)Dependent    FAMILY HISTORY:    Unable to obtain due to dementia  Review of Systems:  Unable to obtain/Limited due to: dementia      PHYSICAL EXAM:    Vital Signs Last 24 Hrs  T(C): 36.4 (21 Apr 2025 08:44), Max: 37.5 (20 Apr 2025 16:05)  T(F): 97.5 (21 Apr 2025 08:44), Max: 99.5 (20 Apr 2025 16:05)  HR: 63 (21 Apr 2025 08:44) (63 - 89)  BP: 159/87 (21 Apr 2025 08:44) (138/80 - 171/80)  RR: 17 (21 Apr 2025 08:44) (17 - 18)  SpO2: 100% (21 Apr 2025 08:44) (95% - 100%)    Parameters below as of 21 Apr 2025 08:44  Patient On (Oxygen Delivery Method): nasal cannula  O2 Flow (L/min): 2    PPSV2: 30  %  FAST: 7D    General: Elderly female in bed in NAD  Mental Status: Awake/non verbal  HEENT: oral mucosa moist  Lungs: clear to auscultation brodie  Cardiac: S1S2+  GI: Abd soft NT ND + BS  : voids  Ext: GOLD on bed  Neuro: dementia      LABS:                        10.7   7.48  )-----------( 225      ( 21 Apr 2025 06:45 )             33.4     04-21    141  |  109[H]  |  28[H]  ----------------------------<  156[H]  4.1   |  27  |  0.67    Ca    10.4[H]      21 Apr 2025 06:45  Phos  3.3     04-21  Mg     1.8     04-21    TPro  5.0[L]  /  Alb  2.2[L]  /  TBili  0.5  /  DBili  0.1  /  AST  83[H]  /  ALT  25  /  AlkPhos  131[H]  04-21    PT/INR - ( 19 Apr 2025 12:22 )   PT: 14.1 sec;   INR: 1.20 ratio         PTT - ( 19 Apr 2025 12:22 )  PTT:34.7 sec  Albumin: Albumin: 2.2 g/dL (04-21 @ 06:45)      Allergies    No Known Allergies    Intolerances      MEDICATIONS  (STANDING):  bisacodyl 5 milliGRAM(s) Oral at bedtime  dextrose 5%. 1000 milliLiter(s) (100 mL/Hr) IV Continuous <Continuous>  dextrose 5%. 1000 milliLiter(s) (50 mL/Hr) IV Continuous <Continuous>  dextrose 50% Injectable 25 Gram(s) IV Push once  dextrose 50% Injectable 12.5 Gram(s) IV Push once  dextrose 50% Injectable 25 Gram(s) IV Push once  enoxaparin Injectable 40 milliGRAM(s) SubCutaneous every 24 hours  glucagon  Injectable 1 milliGRAM(s) IntraMuscular once  insulin lispro (ADMELOG) corrective regimen sliding scale   SubCutaneous three times a day before meals  insulin lispro (ADMELOG) corrective regimen sliding scale   SubCutaneous at bedtime  lactulose Syrup 10 Gram(s) Oral daily  levothyroxine 75 MICROGram(s) Oral daily  piperacillin/tazobactam IVPB.. 3.375 Gram(s) IV Intermittent every 8 hours  sodium chloride 0.45% with potassium chloride 20 mEq/L 1000 milliLiter(s) (60 mL/Hr) IV Continuous <Continuous>  valsartan 80 milliGRAM(s) Oral daily    MEDICATIONS  (PRN):  acetaminophen     Tablet .. 650 milliGRAM(s) Oral every 6 hours PRN Temp greater or equal to 38C (100.4F), Mild Pain (1 - 3)  aluminum hydroxide/magnesium hydroxide/simethicone Suspension 30 milliLiter(s) Oral every 4 hours PRN Dyspepsia  dextrose Oral Gel 15 Gram(s) Oral once PRN Blood Glucose LESS THAN 70 milliGRAM(s)/deciliter  hydrALAZINE Injectable 10 milliGRAM(s) IV Push every 6 hours PRN SBP > 160  magnesium hydroxide Suspension 30 milliLiter(s) Oral daily PRN Constipation  melatonin 3 milliGRAM(s) Oral at bedtime PRN Insomnia  ondansetron Injectable 4 milliGRAM(s) IV Push every 8 hours PRN Nausea and/or Vomiting      RADIOLOGY/ADDITIONAL STUDIES:  < from: CT Head No Cont (04.20.25 @ 15:43) >    ACC: [id number] EXAM:  CT BRAIN   ORDERED BY: EVANGELIST GUERRERO     PROCEDURE DATE:  04/20/2025          INTERPRETATION:  .    CLINICAL INFORMATION: Right-sided weakness. Evaluate for acute pathology.    TECHNIQUE: Multiple axial CT images of the head were obtained without   contrast. Sagittal and coronal reconstructed images were acquired from   the source data.    COMPARISON: No prior CT studies of the brain are available for comparison   at this institution.    FINDINGS: There is no acute intracranial hemorrhage, mass effect, shift   of the midline structures, herniation, extra-axial fluid collection, or   hydrocephalus.    There is mild diffuse cerebral volume loss with prominence of the sulci,   fissures, and cisternal spaces which is normal for the patient's age.   There is moderate patchy confluent deep and periventricular white matter   hypoattenuation statistically compatible with microvascular changes given   calcific atherosclerotic disease of the intracranial arteries.    The paranasal sinuses and right tympanomastoid cavities are clear.   Nonspecific opacification of the left mastoid tip. The calvarium is   intact. There is evidence of bilateral cataract removal.    IMPRESSION: No acute intracranial hemorrhage, mass effect, or shift of   the midline structures.    Moderate severity chronic white matter microvascular type changes.    --- End of Report ---      < from: CT Abdomen and Pelvis w/ IV Cont (04.19.25 @ 13:40) >    ACC: [id number] EXAM:  CT ABDOMEN AND PELVIS IC   ORDERED BY: VICENTA IVERSON     ACC: [id number] EXAM:  CT ANGIO CHEST PULM ART WAWIC   ORDERED BY:   VICENTA IVERSON     PROCEDURE DATE:  04/19/2025          INTERPRETATION:  CLINICAL INFORMATION: Sepsis. Dyspnea. History of breast   cancer in 2024. History of colon cancer status post right hemicolectomy   colectomy in 2017. History of MALT lymphoma in 2019.    COMPARISON: PET/CT 12/20/2024.    CONTRAST/COMPLICATIONS:  IV Contrast: Omnipaque 350  90 cc administered   0 cc discarded  Oral Contrast: NONE  .    PROCEDURE:  CT Angiography of the Chest was performed followed by portal venous phase   imaging of the Abdomen and Pelvis.  Sagittal and coronal reformats were performed as well as 3D (MIP)   reconstructions.    FINDINGS:  CHEST:  LUNGS AND LARGE AIRWAYS: Patent central airways. Several new pulmonary   micronodules, the largest measure 7 mm in the anterior segment right   upper lobe on axial series 4, image 195, and 9 mm in the superior segment   left lower lobe on axial series 4, image 322.  PLEURA: New 1.5 cm pleural nodule in the posterior left lung base   inseparable from the underlying ninth rib. Trace pleural effusions.  VESSELS: Pulmonary arteries are opacified to the segmental level. No   evidence of acute pulmonary embolus within the limitations of respiratory   motion artifact. Main pulmonary artery is normal in caliber.   Atherosclerotic changes of the aorta without aneurysm. Coronary artery   calcifications..  HEART: Heart size is normal. No pericardial effusion.  MEDIASTINUM AND JUANITO: No lymphadenopathy..  CHEST WALL AND LOWER NECK: Asymmetric masslike soft tissue in the right   breast containing a biopsy clip. Incompletely imaged complex fatty mass   in the right triceps musculature. Incompletely imaged 2.5 cm left thyroid   nodule. Resolution of previously seen right axillary and subpectoral   adenopathy.    ABDOMEN AND PELVIS:  LIVER: Multiple cystic and low-density lesions in the liver, too small to   characterize, unchanged from the prior PET/CT.  BILE DUCTS: Normal caliber.  GALLBLADDER: 5 mm density inseparable from the posterior wall of the   gallbladder on axial series 3, image 40 and sagittal series 606, image   56. This could represent a small polyp.  SPLEEN: Within normal limits.  PANCREAS: Within normal limits.  ADRENALS: Within normal limits.  KIDNEYS/URETERS: Severe right hydroureteronephrosis to level of a 8 mm   calculus in the distal right ureter, unchanged. Bilateral   nephrolithiasis. Left renal pelviectasis.    BLADDER: Foci of air in the urinary bladder, correlate for recent   catheterization  REPRODUCTIVE ORGANS: Unremarkable by CT    BOWEL: Right hemicolectomy with right upper quadrant anastomosis. No   obstruction..  PERITONEUM/RETROPERITONEUM: No free air or ascites  VESSELS: Diffuse atherosclerotic changes.  LYMPH NODES: No bulky retroperitoneal adenopathy, with the largest node   in the right common iliac chain, measures 5.2 x 4.2 cm.  ABDOMINAL WALL: Unremarkable  BONES: New lytic metastasis scattered throughout the axial skeleton.   Pathologic fractures of the posterior right 10th and anterolateral left   ninth ribs. New osseous metastases to the inferior right scapula (2-9).   Lytic metastasis in the T3 vertebral body erodes the posterior cortex.   Destructive lesion in the right iliac body with soft tissue mass   extending into the psoas and gluteus muscles.    IMPRESSION:    1. No acute pulmonary embolus.  2. New pulmonary and pleural nodules, compatible with progression of   metastatic disease.  3. Progression of osteolytic metastasis, with pathologic fractures of the   right 10th and left ninth ribs.  4. Redemonstrated severe right hydronephrosis secondary to an 8 mm stone   in the distal right ureter.  5. Unchanged bulky retroperitoneal adenopathy.  6. Unchanged masslike soft tissue in the right breast.  7. Resolution of previously seen right subpectoral and axillary   adenopathy.    --- End of Report ---      < from: Xray Chest 1 View-PORTABLE IMMEDIATE (04.19.25 @ 13:12) >    ACC: [id number] EXAM:  XR CHEST PORTABLE IMMED 1V   ORDERED BY: VICENTA IVERSON     PROCEDURE DATE:  04/19/2025          INTERPRETATION:  TECHNIQUE: Single portable view of the chest.    COMPARISON:  1/18/2025    CLINICAL HISTORY: Sepsis    FINDINGS:    Single frontal view of the chest demonstrates mild bibasilar atelectasis.   Patient's known pulmonary nodules and osseous metastasis are better seen   on the subsequent CT. The cardiomediastinal silhouette is unremarkable.   No acute osseous abnormalities. Overlying EKG leads and wires are noted.   Please refer to the subsequent chest CT for further details. Osteopenia.    IMPRESSION: Mild bibasilar atelectasis.

## 2025-04-21 NOTE — PHYSICAL THERAPY INITIAL EVALUATION ADULT - PERTINENT HX OF CURRENT PROBLEM, REHAB EVAL
84-year-old female with a past medical history of metastatic breast cancer (currently receiving radiation therapy), type 2 diabetes mellitus, colon cancer status post right hemicolectomy in 2017, HTN, hypothyroidism, and advanced dementia (nonverbal) presents with generalized weakness. HPI is limited due to advanced dementia. Daughter at bedside provides collateral history, reporting that the patient has metastatic breast cancer and has received radiation in the past. She is currently being followed by Dr. Riddle as an outpatient for monitoring. The patient has advanced dementia and is completely dependent on her daughter for care. Over the past two weeks, the patient has experienced a decline in her health. As per the daughter, the patient was ambulatory two weeks ago but has since become more bedbound and lethargic. The patient was found to have an elevated WBC count at a primary care provider visit three days ago and was empirically started on ciprofloxacin without symptom improvement. Today, the patient was noted to have hypoxia to 80% at home by her daughter and was brought to the emergency department for further evaluation. ROS not obtainable due to the patient’s mentation.

## 2025-04-21 NOTE — PHYSICAL THERAPY INITIAL EVALUATION ADULT - GENERAL OBSERVATIONS, REHAB EVAL
Pre and post session: supine in bed with bed alarm on. Call bell and phone in reach. IV and Monitor in place. Dtr present. Incoherent language. Tries to communicate. Tolerated fair and would benefit from further skilled PT for functional mobility training.

## 2025-04-21 NOTE — CONSULT NOTE ADULT - SUBJECTIVE AND OBJECTIVE BOX
Neurology Consult requested by:   Patient is a 84y old  Female who presents with a chief complaint of Hypoxia (21 Apr 2025 09:55)     HPI:  84-year-old woman PMHx metastatic breast cancer (currently receiving radiation therapy), DM-II, colon cancer status post right hemicolectomy in 2017, HTN, hypothyroidism, and advanced dementia (nonverbal) presents with generalized weakness. Hx obtained with daughter at bedside. Over the past two weeks, the patient has experienced a decline in her functioning capacity. Daughter noticed about 2 weeks ago, seemed listless at times, not moving RUE as much. was seen by [facility], was found to have an elevated WBC count and was empirically started on ciprofloxacin without symptom improvement. Day of admission, she was  noted to have hypoxia to 80% at home by her daughter and was brought to the emergency department for further evaluation.     PAST MEDICAL & SURGICAL HISTORY:  DM (diabetes mellitus)      Hypothyroidism      Dementia      HLD (hyperlipidemia)      HTN (hypertension)      Breast cancer        FAMILY HISTORY:    Social Hx:  Nonsmoker, no drug or alcohol use  Medications and Allergies ReviewedMEDICATIONS  (STANDING):  bisacodyl 5 milliGRAM(s) Oral at bedtime  enoxaparin Injectable 40 milliGRAM(s) SubCutaneous every 24 hours  glucagon  Injectable 1 milliGRAM(s) IntraMuscular once  insulin lispro (ADMELOG) corrective regimen sliding scale   SubCutaneous three times a day before meals  insulin lispro (ADMELOG) corrective regimen sliding scale   SubCutaneous at bedtime  lactulose Syrup 10 Gram(s) Oral daily  levothyroxine 75 MICROGram(s) Oral daily  piperacillin/tazobactam IVPB.. 3.375 Gram(s) IV Intermittent every 8 hours  sodium chloride 0.45% with potassium chloride 20 mEq/L 1000 milliLiter(s) (60 mL/Hr) IV Continuous <Continuous>  tamsulosin 0.4 milliGRAM(s) Oral at bedtime  valsartan 80 milliGRAM(s) Oral daily     ROS: Pertinent positives in HPI, all other ROS were reviewed and are negative.      Examination:   Vital Signs Last 24 Hrs  T(C): 36.4 (21 Apr 2025 08:44), Max: 37.5 (20 Apr 2025 16:05)  T(F): 97.5 (21 Apr 2025 08:44), Max: 99.5 (20 Apr 2025 16:05)  HR: 63 (21 Apr 2025 08:44) (63 - 79)  BP: 159/87 (21 Apr 2025 08:44) (138/80 - 159/87)  BP(mean): --  RR: 17 (21 Apr 2025 08:44) (17 - 18)  SpO2: 100% (21 Apr 2025 08:44) (95% - 100%)    Parameters below as of 21 Apr 2025 08:44  Patient On (Oxygen Delivery Method): nasal cannula  O2 Flow (L/min): 2    General: NAD   NECK: supple, no masses  ENT: grossly normal hearing   Vascular : no carotid bruits,   Lungs: CTAB  Chest: RRR, no murmurs  Extremities: nontender, no edema  Musculoskeletal: no adventitious movements, no joint stiffness  Skin: no rash    Neurological Examination:  NIHSS:13  MS: A, mumbles, not appropriate responses, follows min commands   CN: ? R VFC, PERLL, EOMI, V1-3 sensation intact, face symmetric, hearing grossly intact, palate elevates symmetrically, tongue midline, SCM equal bilaterally  Motor: reduced tone on R, RUE weakness, unable to hold arm up, RLE ~ 2/5, LUE ~ 4/5, LLE 3+/5   Sens: Intact to light touch.    Reflexes: 2-3/4 all over, downgoing toes b/l  Coord:  Limited, doesnt follow commands   Gait: Cannot test    Labs: Reviewed  Imaging: Reviewed    CBC with Auto Diff (Reflex to Man Diff) (04.21.25 @ 06:45)   WBC Count: 7.48 K/uL  RBC Count: 3.89 M/uL  Hemoglobin: 10.7 g/dL  Hematocrit: 33.4 %  Mean Cell Volume: 85.9 fl  Mean Cell Hemoglobin: 27.5 pg  Mean Cell Hemoglobin Conc: 32.0 g/dL  Red Cell Distrib Width: 14.8 %  Platelet Count - Automated: 225 K/uL  Basic Metabolic Panel (04.21.25 @ 06:45)   Sodium: 141 mmol/L  Potassium: 4.1 mmol/L  Chloride: 109 mmol/L  Carbon Dioxide: 27 mmol/L  Anion Gap: 5 mmol/L  Blood Urea Nitrogen: 28 mg/dL  Creatinine: 0.67 mg/dL  Glucose: 156 mg/dL  Calcium: 10.4 mg/dL  eGFR: 85:    atic Function Panel (04.21.25 @ 06:45)   Indirect Reacting Bilirubin: 0.4 mg/dL  Protein Total: 5.0 gm/dL  Albumin: 2.2 g/dL  Bilirubin Total: 0.5 mg/dL  Bilirubin Direct: 0.1 mg/dL  Alkaline Phosphatase: 131 U/L  Aspartate Aminotransferase (AST/SGOT): 83 U/L  Alanine Aminotransferase (ALT/SGPT): 25 U/L    C-Reactive Protein: 33.6 mg/L (04.21.25 @ 06:45)   < from: CT Head No Cont (04.20.25 @ 15:43) >  FINDINGS: There is no acute intracranial hemorrhage, mass effect, shift   of the midline structures, herniation, extra-axial fluid collection, or   hydrocephalus.    There is mild diffuse cerebral volume loss with prominence of the sulci,   fissures, and cisternal spaces which is normal for the patient's age.   There is moderate patchy confluent deep and periventricular white matter   hypoattenuation statistically compatible with microvascular changes given   calcific atherosclerotic disease of the intracranial arteries.    The paranasal sinuses and right tympanomastoid cavities are clear.   Nonspecific opacification of the left mastoid tip. The calvarium is   intact. There is evidence of bilateral cataract removal.    IMPRESSION: No acute intracranial hemorrhage, mass effect, or shift of   the midline structures.    Moderate severity chronic white matter microvascular type changes.    --- End of Report ---    < end of copied text >

## 2025-04-22 LAB
AMMONIA BLD-MCNC: 31 UMOL/L — SIGNIFICANT CHANGE UP (ref 11–32)
ANION GAP SERPL CALC-SCNC: 5 MMOL/L — SIGNIFICANT CHANGE UP (ref 5–17)
BUN SERPL-MCNC: 29 MG/DL — HIGH (ref 7–23)
CALCIUM SERPL-MCNC: 10.6 MG/DL — HIGH (ref 8.5–10.1)
CHLORIDE SERPL-SCNC: 108 MMOL/L — SIGNIFICANT CHANGE UP (ref 96–108)
CO2 SERPL-SCNC: 26 MMOL/L — SIGNIFICANT CHANGE UP (ref 22–31)
CREAT SERPL-MCNC: 0.62 MG/DL — SIGNIFICANT CHANGE UP (ref 0.5–1.3)
CRP SERPL-MCNC: 55.5 MG/L — HIGH (ref 0–5)
EGFR: 87 ML/MIN/1.73M2 — SIGNIFICANT CHANGE UP
EGFR: 87 ML/MIN/1.73M2 — SIGNIFICANT CHANGE UP
GLUCOSE BLDC GLUCOMTR-MCNC: 173 MG/DL — HIGH (ref 70–99)
GLUCOSE BLDC GLUCOMTR-MCNC: 222 MG/DL — HIGH (ref 70–99)
GLUCOSE BLDC GLUCOMTR-MCNC: 281 MG/DL — HIGH (ref 70–99)
GLUCOSE BLDC GLUCOMTR-MCNC: 286 MG/DL — HIGH (ref 70–99)
GLUCOSE SERPL-MCNC: 249 MG/DL — HIGH (ref 70–99)
HCT VFR BLD CALC: 36.2 % — SIGNIFICANT CHANGE UP (ref 34.5–45)
HGB BLD-MCNC: 11.8 G/DL — SIGNIFICANT CHANGE UP (ref 11.5–15.5)
MAGNESIUM SERPL-MCNC: 1.8 MG/DL — SIGNIFICANT CHANGE UP (ref 1.6–2.6)
MCHC RBC-ENTMCNC: 27.5 PG — SIGNIFICANT CHANGE UP (ref 27–34)
MCHC RBC-ENTMCNC: 32.6 G/DL — SIGNIFICANT CHANGE UP (ref 32–36)
MCV RBC AUTO: 84.4 FL — SIGNIFICANT CHANGE UP (ref 80–100)
NRBC # BLD AUTO: 0.02 K/UL — HIGH (ref 0–0)
NRBC # FLD: 0.02 K/UL — HIGH (ref 0–0)
NRBC BLD AUTO-RTO: 0 /100 WBCS — SIGNIFICANT CHANGE UP (ref 0–0)
PHOSPHATE SERPL-MCNC: 2.9 MG/DL — SIGNIFICANT CHANGE UP (ref 2.5–4.5)
PLATELET # BLD AUTO: 221 K/UL — SIGNIFICANT CHANGE UP (ref 150–400)
PMV BLD: 9.5 FL — SIGNIFICANT CHANGE UP (ref 7–13)
POTASSIUM SERPL-MCNC: 4 MMOL/L — SIGNIFICANT CHANGE UP (ref 3.5–5.3)
POTASSIUM SERPL-SCNC: 4 MMOL/L — SIGNIFICANT CHANGE UP (ref 3.5–5.3)
PROCALCITONIN SERPL-MCNC: 0.23 NG/ML — HIGH (ref 0.02–0.1)
RBC # BLD: 4.29 M/UL — SIGNIFICANT CHANGE UP (ref 3.8–5.2)
RBC # FLD: 15 % — HIGH (ref 10.3–14.5)
SODIUM SERPL-SCNC: 139 MMOL/L — SIGNIFICANT CHANGE UP (ref 135–145)
WBC # BLD: 10.01 K/UL — SIGNIFICANT CHANGE UP (ref 3.8–10.5)
WBC # FLD AUTO: 10.01 K/UL — SIGNIFICANT CHANGE UP (ref 3.8–10.5)

## 2025-04-22 PROCEDURE — 99233 SBSQ HOSP IP/OBS HIGH 50: CPT

## 2025-04-22 PROCEDURE — 99232 SBSQ HOSP IP/OBS MODERATE 35: CPT

## 2025-04-22 PROCEDURE — 95819 EEG AWAKE AND ASLEEP: CPT | Mod: 26

## 2025-04-22 PROCEDURE — 70553 MRI BRAIN STEM W/O & W/DYE: CPT | Mod: 26

## 2025-04-22 RX ADMIN — INSULIN LISPRO 6: 100 INJECTION, SOLUTION INTRAVENOUS; SUBCUTANEOUS at 12:30

## 2025-04-22 RX ADMIN — INSULIN LISPRO 6: 100 INJECTION, SOLUTION INTRAVENOUS; SUBCUTANEOUS at 17:51

## 2025-04-22 RX ADMIN — ENOXAPARIN SODIUM 40 MILLIGRAM(S): 100 INJECTION SUBCUTANEOUS at 17:51

## 2025-04-22 RX ADMIN — Medication 5 MILLIGRAM(S): at 22:15

## 2025-04-22 RX ADMIN — ATORVASTATIN CALCIUM 10 MILLIGRAM(S): 80 TABLET, FILM COATED ORAL at 22:14

## 2025-04-22 RX ADMIN — Medication 80 MILLIGRAM(S): at 09:42

## 2025-04-22 RX ADMIN — POTASSIUM CHLORIDE, DEXTROSE MONOHYDRATE AND SODIUM CHLORIDE 60 MILLILITER(S): 150; 5; 900 INJECTION, SOLUTION INTRAVENOUS at 09:42

## 2025-04-22 RX ADMIN — Medication 10 MILLIGRAM(S): at 07:59

## 2025-04-22 RX ADMIN — INSULIN LISPRO 2: 100 INJECTION, SOLUTION INTRAVENOUS; SUBCUTANEOUS at 07:59

## 2025-04-22 RX ADMIN — LACTULOSE 10 GRAM(S): 10 SOLUTION ORAL at 09:43

## 2025-04-22 RX ADMIN — TAMSULOSIN HYDROCHLORIDE 0.4 MILLIGRAM(S): 0.4 CAPSULE ORAL at 22:14

## 2025-04-22 NOTE — PROGRESS NOTE ADULT - SUBJECTIVE AND OBJECTIVE BOX
84-year-old female with a past medical history of metastatic breast cancer (currently receiving radiation therapy), type 2 diabetes mellitus, colon cancer status post right hemicolectomy in 2017, HTN, hypothyroidism, and advanced dementia (nonverbal) presents with generalized weakness. HPI is limited due to advanced dementia. Daughter at bedside provides collateral history, reporting that the patient has metastatic breast cancer and has received radiation in the past. She is currently being followed by Dr. Riddle as an outpatient for monitoring. As per the daughter, the patient was ambulatory two weeks ago but has since become more bedbound and lethargic. The patient was found to have an elevated WBC count at a primary care provider visit three days ago and was empirically started on ciprofloxacin without symptom improvement. Today, the patient was noted to have hypoxia to 80% at home by her daughter and was brought to the emergency department for further evaluation. Palliative Consult to further establish GOC  4/21/25 Seen and examined at bedside. Non verbal although awake and eating breakfast as fed by daughter.  4/22/25 Seen and examined at bedside. Appears comfortable    PAIN: ( )Yes   (X )No  No non verbal signs or symptoms    DYSPNEA: ( ) Yes  ( X) No      PAST MEDICAL & SURGICAL HISTORY:  DM (diabetes mellitus)  Hypothyroidism  Dementia  HLD (hyperlipidemia)  HTN (hypertension)  Breast cancer    SOCIAL HX:  Lives with daughter  Hx opiate tolerance ( )YES  (X )NO    Baseline ADLs  (Prior to Admission)  ( ) Independent   ( X)Dependent    FAMILY HISTORY:    Unable to obtain due to dementia  Review of Systems:  Unable to obtain/Limited due to: dementia      PHYSICAL EXAM:  ICU Vital Signs Last 24 Hrs  T(C): 36.4 (22 Apr 2025 07:35), Max: 37.6 (21 Apr 2025 22:12)  T(F): 97.6 (22 Apr 2025 07:35), Max: 99.6 (21 Apr 2025 22:12)  HR: 84 (22 Apr 2025 09:41) (77 - 89)  BP: 122/89 (22 Apr 2025 09:41) (122/89 - 196/81)  RR: 14 (22 Apr 2025 07:35) (14 - 18)  SpO2: 95% (22 Apr 2025 07:35) (94% - 96%)    O2 Parameters below as of 22 Apr 2025 07:35  Patient On (Oxygen Delivery Method): nasal cannula  O2 Flow (L/min): 2    PPSV2: 30  %  FAST: 7D    General: Elderly female in bed in NAD  Mental Status: Awake/non verbal  HEENT: oral mucosa moist  Lungs: clear to auscultation brodie  Cardiac: S1S2+  GI: Abd soft NT ND + BS  : voids  Ext: GOLD on bed  Neuro: dementia      LABS:                            11.8   10.01 )-----------( 221      ( 22 Apr 2025 10:16 )             36.2                 04-22    139  |  108  |  29[H]  ----------------------------<  249[H]  4.0   |  26  |  0.62    Ca    10.6[H]      22 Apr 2025 10:16  Phos  2.9     04-22  Mg     1.8     04-22     PTT - ( 19 Apr 2025 12:22 )  PTT:34.7 sec  Albumin: Albumin: 2.2 g/dL (04-21 @ 06:45)      Allergies    No Known Allergies    Intolerances    MEDICATIONS  (STANDING):  atorvastatin 10 milliGRAM(s) Oral at bedtime  bisacodyl 5 milliGRAM(s) Oral at bedtime  dextrose 5%. 1000 milliLiter(s) (100 mL/Hr) IV Continuous <Continuous>  dextrose 5%. 1000 milliLiter(s) (50 mL/Hr) IV Continuous <Continuous>  dextrose 50% Injectable 25 Gram(s) IV Push once  dextrose 50% Injectable 12.5 Gram(s) IV Push once  dextrose 50% Injectable 25 Gram(s) IV Push once  enoxaparin Injectable 40 milliGRAM(s) SubCutaneous every 24 hours  glucagon  Injectable 1 milliGRAM(s) IntraMuscular once  insulin lispro (ADMELOG) corrective regimen sliding scale   SubCutaneous three times a day before meals  insulin lispro (ADMELOG) corrective regimen sliding scale   SubCutaneous at bedtime  lactulose Syrup 10 Gram(s) Oral daily  levothyroxine 75 MICROGram(s) Oral daily  sodium chloride 0.45% with potassium chloride 20 mEq/L 1000 milliLiter(s) (60 mL/Hr) IV Continuous <Continuous>  tamsulosin 0.4 milliGRAM(s) Oral at bedtime  valsartan 80 milliGRAM(s) Oral daily    MEDICATIONS  (PRN):  acetaminophen     Tablet .. 650 milliGRAM(s) Oral every 6 hours PRN Temp greater or equal to 38C (100.4F), Mild Pain (1 - 3)  aluminum hydroxide/magnesium hydroxide/simethicone Suspension 30 milliLiter(s) Oral every 4 hours PRN Dyspepsia  dextrose Oral Gel 15 Gram(s) Oral once PRN Blood Glucose LESS THAN 70 milliGRAM(s)/deciliter  hydrALAZINE Injectable 10 milliGRAM(s) IV Push every 6 hours PRN SBP > 160  magnesium hydroxide Suspension 30 milliLiter(s) Oral daily PRN Constipation  melatonin 3 milliGRAM(s) Oral at bedtime PRN Insomnia  ondansetron Injectable 4 milliGRAM(s) IV Push every 8 hours PRN Nausea and/or Vomiting      RADIOLOGY/ADDITIONAL STUDIES:  < from: MR Head w/wo IV Cont (04.22.25 @ 08:58) >    ACC: [id number] EXAM:  MR BRAIN WAW IC   ORDERED BY: EVANGELIST GUERRERO     PROCEDURE DATE:  04/22/2025          INTERPRETATION:  .    CLINICAL INFORMATION: Breast cancer. Encephalopathy. Evaluate for brain   metastatic disease.    TECHNIQUE: Multiplanar multisequential MRI of the brain was acquired with   and without the administration of IV gadolinium. 6 cc's of IV Gadavist   was administered for the purposes of this examination. 1.5 cc were   discarded.    COMPARISON: Prior head CT exam from 4/20/2025. No prior contrast enhanced   brain MRI studies are available for comparison.    FINDINGS: Multiple patchy confluent nonspecific foci of T2/FLAIR   hyperintensity are noted throughout the deep and periventricular white   matter of the cerebralhemispheres. There is no associated mass effect.   There is no evidence of acute ischemia on the diffusion-weighted images.    Numerous foci of susceptibility artifact are seen scattered throughout   the brain at the gray-white interface.    No abnormal brain parenchymal or leptomeningeal enhancement is seen   suggestive presence of intracranial metastatic disease.    There is mild diffuse cerebral volume loss with prominence of the sulci,   fissures, and cisternal spaces which is normal for the patient's age. No   hydrocephalus is seen. There is an incidental cavum septum pellucidum et   vergae. Flow-voids are noted throughout the major intracranial vessels,   on the T2 weighted images, consistent with their patency. The sellar   region and posterior fossa appear unremarkable.    The paranasal sinuses and tympanomastoid cavities are clear. The   calvarium appears intact. Small enhancing lesions are seen within the   calvarium and clivus which also demonstrate reduced diffusion. There is   evidence of bilateral cataract removal.    IMPRESSION: No evidence of intracranial metastatic disease.    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    Numerous foci of susceptibility artifact scattered throughout the brain   which may reflect areas of chronic microhemorrhage. Underlying amyloid   angiopathy is also a possibility.    Similar-appearing moderate severity chronic white matter microvascular   type changes.    < end of copied text >

## 2025-04-22 NOTE — PROGRESS NOTE ADULT - ASSESSMENT
85 y/o woman PMH of metastatic breast cancer, h/o colon cancer, advanced vascular dementia, HTN, hypothyroidism, DM-2; admitted for  weakness, w/u shows acute hypoxic respiratory failure , new pulmonary nodules. On exam evidence of cognitive disfunction, R weakness. CT/MR head no acute changes, no mets. EEG c/w encephalopathy, no evidence for acute CVA, mets.   Suggest:  avoid anti plts, increased risk of bleeding in view of MR head findings micro hemorrhages (fronto temporal dementias)  MR cervical spine w/wo. r/o cervical cord disease  Oncology f/u  supportive care as per medicine  DVT prophylaxis    Consider comfort/palliative care    Discussed with pts daughter and Dr. Soto

## 2025-04-22 NOTE — PROGRESS NOTE ADULT - ASSESSMENT
84-year-old female with a past medical history of metastatic breast cancer (currently receiving radiation therapy), type 2 diabetes mellitus, colon cancer status post right hemicolectomy in 2017, HTN, hypothyroidism, and advanced dementia (nonverbal) presents with generalized weakness. HPI is limited due to advanced dementia. Daughter at bedside provides collateral history, reporting that the patient has metastatic breast cancer and has received radiation in the past. She is currently being followed by Dr. Riddle as an outpatient for monitoring. As per the daughter, the patient was ambulatory two weeks ago but has since become more bedbound and lethargic. The patient was found to have an elevated WBC count at a primary care provider visit three days ago and was empirically started on ciprofloxacin without symptom improvement. Today, the patient was noted to have hypoxia to 80% at home by her daughter and was brought to the emergency department for further evaluation. Palliative Consult to further establish GOC  4/21/25 Seen and examined at bedside. Non verbal although awake and eating breakfast as fed by daughter.    Assessment and Plan:    1) AMS  -Dementia  -? baseline  -CT head no acute process  -MRI head noted   -O2 supplementation       2) Breast cancer  -Right sided abdominal pain, constipation  -Liver lesions on CT, GB polyp   -Pulm mets/Bone mets  -CT chest noted  -Heme/onc consult      3) Dementia  - Dysphagia  - RUE tremor   -Fall and aspiration precautions.  -speech and swallow eval. - pureed diet     4) Advanced Directives  -Pt lacks capacity  -GOC done with daughter Summer AguiarYOVANI DNR/DNI/TrailNIV on chart  -Daughter deciding between [facility] and home hospice  -Our team will follow        Time Spent: 55 minutes including the care, coordination and counseling of this patient, 50% of which was spent coordinating and counseling.         84-year-old female with a past medical history of metastatic breast cancer (currently receiving radiation therapy), type 2 diabetes mellitus, colon cancer status post right hemicolectomy in 2017, HTN, hypothyroidism, and advanced dementia (nonverbal) presents with generalized weakness. HPI is limited due to advanced dementia. Daughter at bedside provides collateral history, reporting that the patient has metastatic breast cancer and has received radiation in the past. She is currently being followed by Dr. Riddle as an outpatient for monitoring. As per the daughter, the patient was ambulatory two weeks ago but has since become more bedbound and lethargic. The patient was found to have an elevated WBC count at a primary care provider visit three days ago and was empirically started on ciprofloxacin without symptom improvement. Today, the patient was noted to have hypoxia to 80% at home by her daughter and was brought to the emergency department for further evaluation. Palliative Consult to further establish GOC  4/21/25 Seen and examined at bedside. Non verbal although awake and eating breakfast as fed by daughter.    Assessment and Plan:    1) AMS  -Dementia  -? baseline  -CT head no acute process  -MRI head noted   -O2 supplementation       2) Breast cancer  -Right sided abdominal pain, constipation  -Liver lesions on CT, GB polyp   -Pulm mets/Bone mets  -CT chest noted  -Heme/onc consult      3) Dementia  - Dysphagia  - RUE tremor   -Fall and aspiration precautions.  -speech and swallow eval. -pureed diet     4) Advanced Directives  -Pt lacks capacity  -GOC done with daughter Summer AguiarYOVANI DNR/DNI/TrailNIV on chart  -Daughter deciding between [facility] and home hospice  -Our team will follow        Time Spent: 55 minutes including the care, coordination and counseling of this patient, 50% of which was spent coordinating and counseling.

## 2025-04-22 NOTE — PROGRESS NOTE ADULT - SUBJECTIVE AND OBJECTIVE BOX
Subjective:  Chief complain : lethargy , minimally responsive, poor po intake   HPI:  83 y/o female with PMH of metastatic breast cancer triple negative,  (currently receiving radiation therapy), h/o colon cancer status post right hemicolectomy in 2017, advanced vascular dementia, HTN, hypothyroidism, DM2 diet controlled  admitted on 4/19 for  generalized weakness. HPI is limited due to advanced dementia. Daughter at bedside provides collateral history. Over the past two weeks, the patient has experienced a decline in her health. As per the daughter, the patient was ambulatory two weeks ago but has since become more bedbound and lethargic. The patient was found to have an elevated WBC count at a primary care provider visit three days ago and was empirically started on ciprofloxacin without symptom improvement. Today, the patient was noted to have hypoxia to 80% at home by her daughter and was brought to the emergency department for further evaluation. ROS not obtainable due to the patient’s mentation.      In ED BP elevated 173/101, SaO2 99 on 2L NC, VSS otherwise.  CBC: WBC 9, H/H 12/37, Plt 250 CMP: BUN/Cr 31/0.7, Glu 234, Alk phos 172,  Flu/COVID: negative.   UA: Cloudy, proteinuria, ketonuria, large blood, 10 wbcs, few bacteria. CTA chest & CT abd/pelvis: No acute pulmonary embolus. New pulmonary and pleural nodules, compatible with progression of metastatic disease. Progression of osteolytic metastasis, with pathologic fractures of the right 10th and left ninth ribs. Redemonstrated severe right hydronephrosis secondary to an 8 mm stone in the distal right ureter. Unchanged bulky retroperitoneal adenopathy. Unchanged masslike soft tissue in the right breast. Resolution of previously seen right subpectoral and axillary adenopathy.     4/20 -  Patient seen and examined at bedside earlier today, daughter at bedside provides history , pt non-verbal, unable to follow  commands, opens the eyes, not using right arm, + right arm tremor noted, unable to move legs is asked , afebrile,  plan explained at length  4/21 - pt seen and examined , more verbal today, participated with PT, denies pain, poor historian, afebrile  4/22 - lethargy persist, do not use right arm, tolerating some po intake, afebrile, denies pain, plan discussed with daughter at bedside    Review of system-  unable to obtain due to mental status      Vital sings reviewed for last 24 h  T(C): 36.7 (04-22-25 @ 16:10), Max: 37.6 (04-21-25 @ 22:12)  T(F): 98.1 (04-22-25 @ 16:10), Max: 99.6 (04-21-25 @ 22:12)  HR: 99 (04-22-25 @ 16:10) (80 - 99)  BP: 142/78 (04-22-25 @ 16:10) (122/89 - 196/81)  RR: 16 (04-22-25 @ 16:10) (14 - 18)  SpO2: 96% (04-22-25 @ 16:10) (95% - 96%)  Wt(kg): --  Daily     Daily   CAPILLARY BLOOD GLUCOSE      POCT Blood Glucose.: 286 mg/dL (22 Apr 2025 17:49)  POCT Blood Glucose.: 281 mg/dL (22 Apr 2025 12:22)  POCT Blood Glucose.: 173 mg/dL (22 Apr 2025 07:47)  POCT Blood Glucose.: 175 mg/dL (21 Apr 2025 22:01)          Physical exam:   General : NAD, appear to be of stated age , well groomed , awake, alert   NERVOUS SYSTEM:  Alert & Oriented X0, opens eyes,  following commands, limited exam, not using RUE, + RUE resting tremor, unable to test sensation and gait  HEAD:  Atraumatic, Normocephalic  EYES: EOMI, PERRLA, conjunctiva and sclera clear  HEENT: dry  mucous membranes, Supple neck , No JVD  CHEST: BS decrease at bases bilaterally; No rales, no rhonchi, no wheezing  HEART: Regular rate and rhythm; No murmurs, no rubs or gallops  ABDOMEN: Soft, RUQ , RLQ tenderness,  Non-distended; Bowel sounds present, no guarding , no peritoneal irritation   GENITOURINARY- Voiding, no suprapubic tenderness  EXTREMITIES:  2+ Peripheral Pulses, No clubbing, cyanosis,   edema  MUSCULOSKELETAL:- No muscle tenderness, Muscle tone normal, No joint tenderness, no Joint swelling,  Joint ROM –normal   SKIN-no rash, no lesion , + right breast hard mass on palpation    Labs radiologic and other test : all reviewed and interpreted :                           11.8   10.01 )-----------( 221      ( 22 Apr 2025 10:16 )             36.2     04-22    139  |  108  |  29[H]  ----------------------------<  249[H]  4.0   |  26  |  0.62    Ca    10.6[H]      22 Apr 2025 10:16  Phos  2.9     04-22  Mg     1.8     04-22    TPro  5.0[L]  /  Alb  2.2[L]  /  TBili  0.5  /  DBili  0.1  /  AST  83[H]  /  ALT  25  /  AlkPhos  131[H]  04-21        LIVER FUNCTIONS - ( 21 Apr 2025 06:45 )  Alb: 2.2 g/dL / Pro: 5.0 gm/dL / ALK PHOS: 131 U/L / ALT: 25 U/L / AST: 83 U/L / GGT: x             MR Head w/wo IV Cont (04.22.25 @ 08:58) >  IMPRESSION: No evidence of intracranial metastatic disease.    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    Numerous foci of susceptibility artifact scattered throughout the brain   which may reflect areas of chronic microhemorrhage. Underlying amyloid   angiopathy is also a possibility.    Similar-appearing moderate severity chronic white matter microvascular   type changes.          CT Head No Cont (04.20.25 @ 15:43) >  IMPRESSION: No acute intracranial hemorrhage, mass effect, or shift of   the midline structures.    Moderate severity chronic white matter microvascular type changes.      < end of copied text >                 CT Abdomen and Pelvis w/ IV Cont (04.19.25 @ 13:40) >    IMPRESSION:    1. No acute pulmonary embolus.  2. New pulmonary and pleural nodules, compatible with progression of   metastatic disease.  3. Progression of osteolytic metastasis, with pathologic fractures of the   right 10th and left ninth ribs.  4. Redemonstrated severe right hydronephrosis secondary to an 8 mm stone   in the distal right ureter.  5. Unchanged bulky retroperitoneal adenopathy.  6. Unchanged masslike soft tissue in the right breast.  7. Resolution of previously seen right subpectoral and axillary   adenopathy.          RECENT CULTURES:    Urine Microscopic-Add On (NC) (04.19.25 @ 12:50)    White Blood Cell - Urine: 10 /HPF   Red Blood Cell - Urine: 1 /HPF   Bacteria: Few /HPF   Cast: 1 /LPF   Hyaline Casts: None Seen   Epithelial Cells: 2 /HPF   Calcium Oxalate Crystals: Present   Comment - Urine: Few mucus strands        Culture - Urine (04.19.25 @ 12:50)    Specimen Source: Urine None   Culture Results:   No growth      Cardiac testing : reviewed   EKG     12 Lead ECG (04.19.25 @ 14:46) >  Ventricular Rate 79 BPM  QTC Calculation(Bazett) 428 ms   Normal sinus rhythm  Nonspecific T wave abnormality  Abnormal ECG  No previous ECGs available        Procedures :     Devices:   MEDICATIONS  (STANDING):  atorvastatin 10 milliGRAM(s) Oral at bedtime  bisacodyl 5 milliGRAM(s) Oral at bedtime  dextrose 5%. 1000 milliLiter(s) (50 mL/Hr) IV Continuous <Continuous>  dextrose 5%. 1000 milliLiter(s) (100 mL/Hr) IV Continuous <Continuous>  dextrose 50% Injectable 25 Gram(s) IV Push once  dextrose 50% Injectable 12.5 Gram(s) IV Push once  dextrose 50% Injectable 25 Gram(s) IV Push once  enoxaparin Injectable 40 milliGRAM(s) SubCutaneous every 24 hours  glucagon  Injectable 1 milliGRAM(s) IntraMuscular once  insulin lispro (ADMELOG) corrective regimen sliding scale   SubCutaneous three times a day before meals  insulin lispro (ADMELOG) corrective regimen sliding scale   SubCutaneous at bedtime  lactulose Syrup 10 Gram(s) Oral daily  levothyroxine 75 MICROGram(s) Oral daily  sodium chloride 0.45% with potassium chloride 20 mEq/L 1000 milliLiter(s) (60 mL/Hr) IV Continuous <Continuous>  tamsulosin 0.4 milliGRAM(s) Oral at bedtime  valsartan 80 milliGRAM(s) Oral daily    MEDICATIONS  (PRN):  acetaminophen     Tablet .. 650 milliGRAM(s) Oral every 6 hours PRN Temp greater or equal to 38C (100.4F), Mild Pain (1 - 3)  aluminum hydroxide/magnesium hydroxide/simethicone Suspension 30 milliLiter(s) Oral every 4 hours PRN Dyspepsia  dextrose Oral Gel 15 Gram(s) Oral once PRN Blood Glucose LESS THAN 70 milliGRAM(s)/deciliter  hydrALAZINE Injectable 10 milliGRAM(s) IV Push every 6 hours PRN SBP > 160  magnesium hydroxide Suspension 30 milliLiter(s) Oral daily PRN Constipation  melatonin 3 milliGRAM(s) Oral at bedtime PRN Insomnia  ondansetron Injectable 4 milliGRAM(s) IV Push every 8 hours PRN Nausea and/or Vomiting

## 2025-04-22 NOTE — PROGRESS NOTE ADULT - CONVERSATION DETAILS
Palliative team met with daughterSylvia to discuss GOC, assist with planning and provide supportive counseling.  Palliative role explained.  Emotional support provided.  Pt lacks capacity.  Daughter shared she discussed [facility] with PT yesterday.  She shared what she has discussed with the medical team.  We discussed the option to change the focus of care from treatment and cure to comfort and quality with the support of home hospice.  The philosophy of hospice discussed in details as well as the services provided at home.  We discussed stopping medical work ups, hospitalizations, xrays, blood work, etc to treat symptoms as they arise at home to ensure Pt is comfortable and not in distress.  Daughter states she is considering hospice at this time but desires to see how Pt does with PT today first.  All questions addressed.      Plan to be further determined.  Daughter aware of palliative team availability.  Our team to continue to follow.

## 2025-04-22 NOTE — GOALS OF CARE CONVERSATION - ADVANCED CARE PLANNING - CONVERSATION DETAILS
Palliative team met with daughterSylvia to discuss GOC, assist with planning and provide supportive counseling.  Palliative role explained.  Emotional support provided.  Pt lacks capacity.  Daughter shared she discussed [facility] with PT yesterday.  We discussed the option to change the focus of care from treatment and cure to comfort and quality with the support of home hospice.  The philosophy of home hospice discussed as well as the services provided at home.  We discussed stopping medical work ups, hospitalizations, xrays, blood work, etc to treat symptoms as they arise at home to ensure Pt is comfortable and not in distress. Palliative team met with daughterSylvia to discuss GOC, assist with planning and provide supportive counseling.  Palliative role explained.  Emotional support provided.  Pt lacks capacity.  Daughter shared she discussed [facility] with PT yesterday.  She shared what she has discussed with the medical team.  We discussed the option to change the focus of care from treatment and cure to comfort and quality with the support of home hospice.  The philosophy of hospice discussed in details as well as the services provided at home.  We discussed stopping medical work ups, hospitalizations, xrays, blood work, etc to treat symptoms as they arise at home to ensure Pt is comfortable and not in distress.  Daughter states she is considering hospice at this time but desires to see how Pt does with PT today first.  All questions addressed.      Plan to be further determined.  Daughter aware of palliative team availability.  Our team to continue to follow.

## 2025-04-22 NOTE — PROGRESS NOTE ADULT - SUBJECTIVE AND OBJECTIVE BOX
HPI:  84-year-old woman PMHx metastatic breast cancer, colon cancer status post right hemicolectomy, DM, HTN, advanced dementia (nonverbal) admitted with recent decline.       MEDICATIONS  (STANDING):  atorvastatin 10 milliGRAM(s) Oral at bedtime  bisacodyl 5 milliGRAM(s) Oral at bedtime  enoxaparin Injectable 40 milliGRAM(s) SubCutaneous every 24 hours  glucagon  Injectable 1 milliGRAM(s) IntraMuscular once  insulin lispro (ADMELOG) corrective regimen sliding scale   SubCutaneous three times a day before meals  insulin lispro (ADMELOG) corrective regimen sliding scale   SubCutaneous at bedtime  lactulose Syrup 10 Gram(s) Oral daily  levothyroxine 75 MICROGram(s) Oral daily  sodium chloride 0.45% with potassium chloride 20 mEq/L 1000 milliLiter(s) (60 mL/Hr) IV Continuous <Continuous>  tamsulosin 0.4 milliGRAM(s) Oral at bedtime  valsartan 80 milliGRAM(s) Oral daily    MEDICATIONS  (PRN):  acetaminophen     Tablet .. 650 milliGRAM(s) Oral every 6 hours PRN Temp greater or equal to 38C (100.4F), Mild Pain (1 - 3)  aluminum hydroxide/magnesium hydroxide/simethicone Suspension 30 milliLiter(s) Oral every 4 hours PRN Dyspepsia  dextrose Oral Gel 15 Gram(s) Oral once PRN Blood Glucose LESS THAN 70 milliGRAM(s)/deciliter  hydrALAZINE Injectable 10 milliGRAM(s) IV Push every 6 hours PRN SBP > 160  magnesium hydroxide Suspension 30 milliLiter(s) Oral daily PRN Constipation  melatonin 3 milliGRAM(s) Oral at bedtime PRN Insomnia  ondansetron Injectable 4 milliGRAM(s) IV Push every 8 hours PRN Nausea and/or Vomiting      Vital Signs Last 24 Hrs  T(C): 36.4 (22 Apr 2025 12:49), Max: 37.6 (21 Apr 2025 22:12)  T(F): 97.5 (22 Apr 2025 12:49), Max: 99.6 (21 Apr 2025 22:12)  HR: 84 (22 Apr 2025 09:41) (77 - 89)  BP: 122/89 (22 Apr 2025 09:41) (122/89 - 196/81)  BP(mean): --  RR: 14 (22 Apr 2025 07:35) (14 - 18)  SpO2: 95% (22 Apr 2025 07:35) (94% - 96%)    Parameters below as of 22 Apr 2025 07:35  Patient On (Oxygen Delivery Method): nasal cannula  O2 Flow (L/min): 2    Neurological Exam:    HF: lethargic, but arouses, mumbles, doesnt follow commands   CN: Pupils are equal and reactive. Extra ocular muscles are intact. There is no facial droop or asymmetry. Tongue is midline. Sensation is intact in the face. Other CN II-XII are intact.   Motor: moving Left side more, weakness RUE ~ 2/5, RLE ~ 2/5, LUE 3/5, LLE ~ 3/5.   Sensory: withdraws to PP bilat.  DTR: 0-1/4 all 4 extremities. Babinski is negative bilateral.  Co-ord:  Limited due to cooperation       Complete Blood Count (04.22.25 @ 10:16)   Auto NRBC: 0 /100 WBCs  WBC Count: 10.01 K/uL  RBC Count: 4.29 M/uL  Hemoglobin: 11.8 g/dL  Hematocrit: 36.2 %  Mean Cell Volume: 84.4 fl  Mean Cell Hemoglobin: 27.5 pg  Mean Cell Hemoglobin Conc: 32.6 g/dL  Red Cell Distrib Width: 15.0 %  Platelet Count - Automated: 221 K/uL    Basic Metabolic Panel (04.22.25 @ 10:16)   Sodium: 139 mmol/L  Potassium: 4.0 mmol/L  Chloride: 108 mmol/L  Carbon Dioxide: 26 mmol/L  Anion Gap: 5 mmol/L  Blood Urea Nitrogen: 29 mg/dL  Creatinine: 0.62 mg/dL  Glucose: 249 mg/dL  Calcium: 10.6 mg/dL  eGFR: 87  mmonia, Serum: 31 umol/L (04.22.25 @ 10:16)   itamin B12, Serum: >2000 pg/mL (04.21.25 @ 06:45)   yroid Stimulating Hormone, Serum: 1.30 uU/mL (04.21.25 @ 06:45)   < from: MR Head w/wo IV Cont (04.22.25 @ 08:58) >    IMPRESSION: No evidence of intracranial metastatic disease.    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    Numerous foci of susceptibility artifact scattered throughout the brain   which may reflect areas of chronic microhemorrhage. Underlying amyloid   angiopathy is also a possibility.    Similar-appearing moderate severity chronic white matter microvascular   type changes.    --- End of Report ---    < end of copied text >      < from: CT Angio Chest PE Protocol w/ IV Cont (04.19.25 @ 13:40) >  IMPRESSION:    1. No acute pulmonary embolus.  2. New pulmonary and pleural nodules, compatible with progression of   metastatic disease.  3. Progression of osteolytic metastasis, with pathologic fractures of the   right 10th and left ninth ribs.  4. Redemonstrated severe right hydronephrosis secondary to an 8 mm stone   in the distal right ureter.  5. Unchanged bulky retroperitoneal adenopathy.  6. Unchanged masslike soft tissue in the right breast.  7. Resolution of previously seen right subpectoral and axillary   adenopathy.    < end of copied text >    EEG CLASSIFICATION:   Findings: Abnormal.     Intermittent background slowing rhythmic theta.    INTERPRETATION:   No events were reported.     No focal, lateralized or epileptiform features were present.     No seizure sequences occurred.     A longer study including a period of sleep may be considered, if clinically indicated, which may increase the yield of epileptiform features..     This is an abnormal EEG.     This is an abnormal EEG consistent with diffuse cerebral dysfunction.     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     This recording is consistent with metabolic derangement.      Electronic Signatures:  Bill Palmer)  (Signed 22-Apr-2025 13:29)  	Authored: TECH INFORMATION, DESCRIPTION OF RECORDING, EVENTS, EEG CLASSIFICATION, INTERPRETATION

## 2025-04-22 NOTE — PROGRESS NOTE ADULT - ASSESSMENT
· 85 y/o female with PMH of metastatic breast cancer triple negative,  (currently receiving radiation therapy), h/o colon cancer status post right hemicolectomy in 2017, advanced vascular dementia, HTN, hypothyroidism, DM2 diet controlled  admitted on 4/19 for  generalized weakness. HPI is limited due to advanced dementia. Daughter at bedside provides collateral history. Over the past two weeks, the patient has experienced a decline in her health. As per the daughter, the patient was ambulatory two weeks ago but has since become more bedbound and lethargic. The patient was found to have an elevated WBC count at a primary care provider visit three days ago and was empirically started on ciprofloxacin without symptom improvement. Today, the patient was noted to have hypoxia to 80% at home by her daughter and was brought to the emergency department for further evaluation. ROS not obtainable due to the patient’s mentation.      # Metabolic encephalopathy , multifactorial ruled out CVA, Calvarial lesions/ mets, amyloid angiopathy with micro hemorrhages, moderate to severe vascular  dementia  # Acute hypoxic respiratory failure , Pulmonary nodules, pleural nodules due to metastatic disease ruled out PE   # Pathologic 9 th L and 10 th  R rib fractures with bone metastasis  # Hypercalcemia  # Pyuria, probable partially treated UTI   # Severe right sided hydronephrosis, retroperitoneal LAD  # Nephrolithiasis   # Metastatic triple negative breast cancer with mets to bones, lungs, LAD  - CTA chest & CT abd/pelvis: no PE, +  New pulmonary and pleural nodules, compatible with progression of metastatic disease. Progression of osteolytic metastasis, with pathologic fractures of the right 10th and left ninth ribs. Redemonstrated severe right hydronephrosis secondary to an 8 mm stone in the distal right ureter. Unchanged bulky retroperitoneal adenopathy. Unchanged masslike soft tissue in the right breast. Resolution of previously seen right subpectoral and axillary adenopathy.   - CT head  - moderate to severe cerebrovascular disease   - MRI head -Multiple small enhancing lesions within the calvarium with internal  reduced diffusion for which calvarial metastatic disease is suspected.  Numerous foci of susceptibility artifact scattered throughout the brain  which may reflect areas of chronic microhemorrhage. Underlying amyloid   angiopathy is also a possibility.  Similar-appearing moderate severity chronic white matter microvascular  type changes.    - no antiplatelets due to micro hemorrhages (fronto temporal dementias)  - MR cervical spine - pending   - check ammonia 41   - , TSH, vit B12 wnl , folate 5.6 , CRP 33, procalcitonin , free Ca, PTH noted   - O2 supplementation   - IV fluids for hypercalcemia  - s/p  Zosyn - will stop 4/21 cultures neg   - hold memantine - can cause sedation  - neurology consult - MRI cervical spine , EEG   - Urology  consult - no need for stent or nephrostomy     # Right sided abdominal pain, constipation  # Liver lesions on CT, GB polyp   # Transaminitis, improving   - repeat liver function tests  - check lipase wnl, CK wnl  - restart statin   - check CK, lipid profile LDL 71  - bowel regiment      # Metastatic breast cancer   - Heme/onc consult - hospice appropriate   -Palliative consult. for GOC   - rad onc consult - no role of radiation to calvarial lesions    #  Dementia. vascular  # Dysphagia  # RUE tremor   -  Fall and aspiration precautions.  - speech and swallow eval. - pureed diet       #  DM (diabetes mellitus).  diet controlled  - diet purreed with supplements   -  mod-ISF with night time coverage.   - Hypoglycemia protocol      #  Hypothyroidism.   - c/w  Levothyroxine. check TSH, free T4      # HTN (hypertension).   - s/p IV hydralazine  - lower dose of valsartan 80 chantelle     #  HLD (hyperlipidemia).   - restart statin  - repeat hepatic function tests in am     DVT ppx: lovenox  Turn and reposition q2h to prevent bedsores    Dispo - PT daily, MRI cervical spine, rad onc and oncology recs,  d/c planning, GOC as per palliative

## 2025-04-23 LAB
ALBUMIN SERPL ELPH-MCNC: 2.1 G/DL — LOW (ref 3.3–5)
ALP SERPL-CCNC: 159 U/L — HIGH (ref 40–120)
ALT FLD-CCNC: 26 U/L — SIGNIFICANT CHANGE UP (ref 12–78)
AMMONIA BLD-MCNC: 37 UMOL/L — HIGH (ref 11–32)
ANION GAP SERPL CALC-SCNC: 5 MMOL/L — SIGNIFICANT CHANGE UP (ref 5–17)
AST SERPL-CCNC: 171 U/L — HIGH (ref 15–37)
BILIRUB DIRECT SERPL-MCNC: 0.2 MG/DL — SIGNIFICANT CHANGE UP (ref 0–0.3)
BILIRUB INDIRECT FLD-MCNC: 0.4 MG/DL — SIGNIFICANT CHANGE UP (ref 0.2–1)
BILIRUB SERPL-MCNC: 0.6 MG/DL — SIGNIFICANT CHANGE UP (ref 0.2–1.2)
BUN SERPL-MCNC: 26 MG/DL — HIGH (ref 7–23)
CALCIUM SERPL-MCNC: 9.9 MG/DL — SIGNIFICANT CHANGE UP (ref 8.5–10.1)
CHLORIDE SERPL-SCNC: 107 MMOL/L — SIGNIFICANT CHANGE UP (ref 96–108)
CO2 SERPL-SCNC: 27 MMOL/L — SIGNIFICANT CHANGE UP (ref 22–31)
CREAT SERPL-MCNC: 0.46 MG/DL — LOW (ref 0.5–1.3)
CRP SERPL-MCNC: 88.4 MG/L — HIGH (ref 0–5)
EGFR: 93 ML/MIN/1.73M2 — SIGNIFICANT CHANGE UP
EGFR: 93 ML/MIN/1.73M2 — SIGNIFICANT CHANGE UP
GLUCOSE BLDC GLUCOMTR-MCNC: 162 MG/DL — HIGH (ref 70–99)
GLUCOSE BLDC GLUCOMTR-MCNC: 172 MG/DL — HIGH (ref 70–99)
GLUCOSE BLDC GLUCOMTR-MCNC: 189 MG/DL — HIGH (ref 70–99)
GLUCOSE BLDC GLUCOMTR-MCNC: 239 MG/DL — HIGH (ref 70–99)
GLUCOSE SERPL-MCNC: 173 MG/DL — HIGH (ref 70–99)
POTASSIUM SERPL-MCNC: 4.3 MMOL/L — SIGNIFICANT CHANGE UP (ref 3.5–5.3)
POTASSIUM SERPL-SCNC: 4.3 MMOL/L — SIGNIFICANT CHANGE UP (ref 3.5–5.3)
PROT SERPL-MCNC: 5.2 GM/DL — LOW (ref 6–8.3)
SODIUM SERPL-SCNC: 139 MMOL/L — SIGNIFICANT CHANGE UP (ref 135–145)

## 2025-04-23 PROCEDURE — 99232 SBSQ HOSP IP/OBS MODERATE 35: CPT

## 2025-04-23 PROCEDURE — 99233 SBSQ HOSP IP/OBS HIGH 50: CPT

## 2025-04-23 PROCEDURE — 99232 SBSQ HOSP IP/OBS MODERATE 35: CPT | Mod: FS

## 2025-04-23 RX ORDER — MEMANTINE HYDROCHLORIDE 21 MG/1
1 CAPSULE, EXTENDED RELEASE ORAL
Refills: 0 | DISCHARGE

## 2025-04-23 RX ORDER — ACETAMINOPHEN 500 MG/5ML
20.3 LIQUID (ML) ORAL
Qty: 1827 | Refills: 0
Start: 2025-04-23 | End: 2025-05-22

## 2025-04-23 RX ORDER — PROCHLORPERAZINE 25 MG
1 SUPPOSITORY, RECTAL RECTAL
Qty: 60 | Refills: 0
Start: 2025-04-23 | End: 2025-05-22

## 2025-04-23 RX ORDER — METFORMIN HYDROCHLORIDE 850 MG/1
1 TABLET ORAL
Refills: 0 | DISCHARGE

## 2025-04-23 RX ORDER — BISACODYL 5 MG
1 TABLET, DELAYED RELEASE (ENTERIC COATED) ORAL
Qty: 30 | Refills: 0
Start: 2025-04-23 | End: 2025-05-22

## 2025-04-23 RX ORDER — TAMSULOSIN HYDROCHLORIDE 0.4 MG/1
1 CAPSULE ORAL
Qty: 30 | Refills: 0
Start: 2025-04-23 | End: 2025-05-22

## 2025-04-23 RX ORDER — NALOXONE HYDROCHLORIDE 0.4 MG/ML
4 INJECTION, SOLUTION INTRAMUSCULAR; INTRAVENOUS; SUBCUTANEOUS
Qty: 3 | Refills: 0
Start: 2025-04-23 | End: 2025-04-23

## 2025-04-23 RX ORDER — ATORVASTATIN CALCIUM 80 MG/1
1 TABLET, FILM COATED ORAL
Refills: 0 | DISCHARGE

## 2025-04-23 RX ORDER — LACTULOSE 10 G/15ML
15 SOLUTION ORAL
Qty: 450 | Refills: 0
Start: 2025-04-23 | End: 2025-05-22

## 2025-04-23 RX ORDER — LORAZEPAM 4 MG/ML
1 VIAL (ML) INJECTION
Qty: 20 | Refills: 0
Start: 2025-04-23 | End: 2025-04-27

## 2025-04-23 RX ADMIN — INSULIN LISPRO 2: 100 INJECTION, SOLUTION INTRAVENOUS; SUBCUTANEOUS at 08:45

## 2025-04-23 RX ADMIN — INSULIN LISPRO 4: 100 INJECTION, SOLUTION INTRAVENOUS; SUBCUTANEOUS at 12:58

## 2025-04-23 RX ADMIN — Medication 80 MILLIGRAM(S): at 10:19

## 2025-04-23 RX ADMIN — INSULIN LISPRO 2: 100 INJECTION, SOLUTION INTRAVENOUS; SUBCUTANEOUS at 17:16

## 2025-04-23 RX ADMIN — ENOXAPARIN SODIUM 40 MILLIGRAM(S): 100 INJECTION SUBCUTANEOUS at 17:06

## 2025-04-23 RX ADMIN — TAMSULOSIN HYDROCHLORIDE 0.4 MILLIGRAM(S): 0.4 CAPSULE ORAL at 21:44

## 2025-04-23 NOTE — PROGRESS NOTE ADULT - SUBJECTIVE AND OBJECTIVE BOX
HPI:  84-year-old woman PMHx metastatic breast cancer, type 2 diabetes mellitus, HTN, hypothyroidism, dementia admitted with generalized weakness.      MEDICATIONS  (STANDING):  atorvastatin 10 milliGRAM(s) Oral at bedtime  bisacodyl 5 milliGRAM(s) Oral at bedtime  enoxaparin Injectable 40 milliGRAM(s) SubCutaneous every 24 hours  glucagon  Injectable 1 milliGRAM(s) IntraMuscular once  insulin lispro (ADMELOG) corrective regimen sliding scale   SubCutaneous three times a day before meals  insulin lispro (ADMELOG) corrective regimen sliding scale   SubCutaneous at bedtime  lactulose Syrup 10 Gram(s) Oral daily  levothyroxine 75 MICROGram(s) Oral daily  sodium chloride 0.45% with potassium chloride 20 mEq/L 1000 milliLiter(s) (60 mL/Hr) IV Continuous <Continuous>  tamsulosin 0.4 milliGRAM(s) Oral at bedtime  valsartan 80 milliGRAM(s) Oral daily    MEDICATIONS  (PRN):  acetaminophen     Tablet .. 650 milliGRAM(s) Oral every 6 hours PRN Temp greater or equal to 38C (100.4F), Mild Pain (1 - 3)  aluminum hydroxide/magnesium hydroxide/simethicone Suspension 30 milliLiter(s) Oral every 4 hours PRN Dyspepsia  dextrose Oral Gel 15 Gram(s) Oral once PRN Blood Glucose LESS THAN 70 milliGRAM(s)/deciliter  hydrALAZINE Injectable 10 milliGRAM(s) IV Push every 6 hours PRN SBP > 160  magnesium hydroxide Suspension 30 milliLiter(s) Oral daily PRN Constipation  melatonin 3 milliGRAM(s) Oral at bedtime PRN Insomnia  ondansetron Injectable 4 milliGRAM(s) IV Push every 8 hours PRN Nausea and/or Vomiting        Vital Signs Last 24 Hrs  T(C): 37 (23 Apr 2025 08:26), Max: 37.2 (23 Apr 2025 00:50)  T(F): 98.6 (23 Apr 2025 08:26), Max: 98.9 (23 Apr 2025 00:50)  HR: 91 (23 Apr 2025 08:26) (80 - 99)  BP: 164/72 (23 Apr 2025 08:26) (142/78 - 164/72)  BP(mean): --  RR: 18 (23 Apr 2025 08:26) (16 - 18)  SpO2: 97% (23 Apr 2025 08:26) (96% - 97%)    Parameters below as of 23 Apr 2025 08:26  Patient On (Oxygen Delivery Method): nasal cannula  O2 Flow (L/min): 2    Neurological Exam:  HF: lethargic, mumbles, doesnt follow commands   CN: Pupils are equal and reactive. Extra ocular muscles are intact. There is no facial droop or asymmetry. Tongue is midline. Sensation is intact in the face. Other CN II-XII are intact.   Motor: moving Left side more, weakness RUE ~ 2/5, RLE ~ 2/5, LUE 3/5, LLE ~ 3/5.   Sensory: withdraws to PP bilat.  DTR: 0-1/4 all 4 extremities. Babinski is negative bilateral.  Co-ord:  Limited due to cooperation       Basic Metabolic Panel in AM (04.23.25 @ 06:20)   Sodium: 139 mmol/L  Potassium: 4.3 mmol/L  Chloride: 107 mmol/L  Carbon Dioxide: 27 mmol/L  Anion Gap: 5 mmol/L  Blood Urea Nitrogen: 26 mg/dL  Creatinine: 0.46 mg/dL  Glucose: 173 mg/dL  Calcium: 9.9 mg/dL  eGFR: 93:    < from: MR Head w/wo IV Cont (04.22.25 @ 08:58) >  IMPRESSION: No evidence of intracranial metastatic disease.    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    Numerous foci of susceptibility artifact scattered throughout the brain   which may reflect areas of chronic microhemorrhage. Underlying amyloid   angiopathy is also a possibility.      INTERPRETATION:   No events were reported.     No focal, lateralized or epileptiform features were present.     No seizure sequences occurred.     A longer study including a period of sleep may be considered, if clinically indicated, which may increase the yield of epileptiform features..     This is an abnormal EEG.     This is an abnormal EEG consistent with diffuse cerebral dysfunction.     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     This recording is consistent with metabolic derangement.      Electronic Signatures:  Bill Palmer)  (Signed 22-Apr-2025 13:29)  	Authored: TECH INFORMATION, DESCRIPTION OF RECORDING, EVENTS, EEG CLASSIFICATION, INTERPRETATION    Similar-appearing moderate severity chronic white matter microvascular   type changes.    < end of copied text >

## 2025-04-23 NOTE — PROGRESS NOTE ADULT - ASSESSMENT
85 y/o F with known advanced dementia, known TNBC with metastasis currently admitted with worse functional decline, FTT and non verbal.      # AMS, FTT in setting of Known Advanced TNBC s/p RT    - Primary Heme Onc Dr Franz in the outpatient setting, was not offered systemic cancer directed therapy given her age, mental status       INCOMPLETE 85 y/o F with known advanced dementia, known TNBC with metastasis currently admitted with worse functional decline, FTT and non verbal.      # AMS, FTT in setting of Known Advanced TNBC s/p RT    - Primary Heme Onc Dr Franz in the outpatient setting, was not offered systemic cancer directed therapy given her age, mental status / dementia, poor functional status  - Patient was referred to [facility] to receive targeted RT to breast lesions / mass  - Patient now admitted with worse functional status, mentation   - Imaging noted evidence of diffuse osseous mets  - Long discussion with daughter at bedside who was more agreeable / amenable to comfort / hospice measures at home  - Palliative already following   - Case management and SW involved as well        Anurag Zambrano PA-C  Hematology Oncology  [facility] [place]  [phone]

## 2025-04-23 NOTE — PROGRESS NOTE ADULT - SUBJECTIVE AND OBJECTIVE BOX
HPI:    83 y/o F with known PMHx of metastatic breast Ca currently receiving RT, prior colon Ca s/p R hemicolectomy 2017, advanced dementia (nonverbal) now presented with generalized weakness. The daughter provided collateral history on admisson, reporting that the patient has metastatic breast Ca, received RT in the past, currently being followed by Primary Heme Onc Dr Franz. Per the daughter, the patient has advanced dementia was completely dependent on her daughter for care but over the past x2 weeks, the patient has experienced a decline in her health. As per the daughter, the patient was ambulatory two weeks ago but has since become more bedbound and lethargic.     04/23/25:      PAST MEDICAL & SURGICAL HISTORY:    DM (diabetes mellitus)    Hypothyroidism    Dementia    HLD (hyperlipidemia)    HTN (hypertension)    Breast cancer        MEDICATIONS  (STANDING):    atorvastatin 10 milliGRAM(s) Oral at bedtime  bisacodyl 5 milliGRAM(s) Oral at bedtime  dextrose 5%. 1000 milliLiter(s) (100 mL/Hr) IV Continuous <Continuous>  dextrose 5%. 1000 milliLiter(s) (50 mL/Hr) IV Continuous <Continuous>  dextrose 50% Injectable 25 Gram(s) IV Push once  dextrose 50% Injectable 12.5 Gram(s) IV Push once  dextrose 50% Injectable 25 Gram(s) IV Push once  enoxaparin Injectable 40 milliGRAM(s) SubCutaneous every 24 hours  glucagon  Injectable 1 milliGRAM(s) IntraMuscular once  insulin lispro (ADMELOG) corrective regimen sliding scale   SubCutaneous three times a day before meals  insulin lispro (ADMELOG) corrective regimen sliding scale   SubCutaneous at bedtime  lactulose Syrup 10 Gram(s) Oral daily  levothyroxine 75 MICROGram(s) Oral daily  sodium chloride 0.45% with potassium chloride 20 mEq/L 1000 milliLiter(s) (60 mL/Hr) IV Continuous <Continuous>  tamsulosin 0.4 milliGRAM(s) Oral at bedtime  valsartan 80 milliGRAM(s) Oral daily      MEDICATIONS  (PRN):    acetaminophen     Tablet .. 650 milliGRAM(s) Oral every 6 hours PRN Temp greater or equal to 38C (100.4F), Mild Pain (1 - 3)  aluminum hydroxide/magnesium hydroxide/simethicone Suspension 30 milliLiter(s) Oral every 4 hours PRN Dyspepsia  dextrose Oral Gel 15 Gram(s) Oral once PRN Blood Glucose LESS THAN 70 milliGRAM(s)/deciliter  hydrALAZINE Injectable 10 milliGRAM(s) IV Push every 6 hours PRN SBP > 160  magnesium hydroxide Suspension 30 milliLiter(s) Oral daily PRN Constipation  melatonin 3 milliGRAM(s) Oral at bedtime PRN Insomnia  ondansetron Injectable 4 milliGRAM(s) IV Push every 8 hours PRN Nausea and/or Vomiting      ALLERGIES:    No Known Allergies        FAMILY HISTORY:    Not noted      REVIEW OF SYSTEMS:    Constitutional, Eyes, ENT, Cardiovascular, Respiratory, Gastrointestinal, Genitourinary, Musculoskeletal, Integumentary, Neurological, Psychiatric, Endocrine, Heme/Lymph and Allergic/Immunologic review of systems are otherwise negative except as noted in HPI.       VITALS:     T(C): 37 (23 Apr 2025 08:26), Max: 37.2 (23 Apr 2025 00:50)  T(F): 98.6 (23 Apr 2025 08:26), Max: 98.9 (23 Apr 2025 00:50)  HR: 91 (23 Apr 2025 08:26) (80 - 99)  BP: 164/72 (23 Apr 2025 08:26) (122/89 - 164/72)  BP(mean): --  RR: 18 (23 Apr 2025 08:26) (16 - 18)  SpO2: 97% (23 Apr 2025 08:26) (96% - 97%)    Parameters below as of 23 Apr 2025 08:26  Patient On (Oxygen Delivery Method): nasal cannula  O2 Flow (L/min): 2      PHYSICAL:    Constitutional:   Eyes: no conjunctival infection, anicteric  ENT: pharynx is unremarkable  Neck: supple without JVD  Pulmonary: clear to auscultation bilaterally  Cardiac: RRR  Vascular: no calf tenderness, venous stasis changes  Abdomen: normoactive bowel sounds, soft and nontender  Lymphatic: no peripheral adenopathy appreciated  Musculoskeletal: full range of motion and no deformities appreciated  Skin: normal appearance, no rash  Neurology:      LABS:    CBC Full  -  ( 22 Apr 2025 10:16 )  WBC Count : 10.01 K/uL  RBC Count : 4.29 M/uL  Hemoglobin : 11.8 g/dL  Hematocrit : 36.2 %  Platelet Count - Automated : 221 K/uL  Mean Cell Volume : 84.4 fl  Mean Cell Hemoglobin : 27.5 pg  Mean Cell Hemoglobin Concentration : 32.6 g/dL  Auto Neutrophil # : x  Auto Lymphocyte # : x  Auto Monocyte # : x  Auto Eosinophil # : x  Auto Basophil # : x  Auto Neutrophil % : x  Auto Lymphocyte % : x  Auto Monocyte % : x  Auto Eosinophil % : x  Auto Basophil % : x    04-23    139  |  107  |  26[H]  ----------------------------<  173[H]  4.3   |  27  |  0.46[L]    Ca    9.9      23 Apr 2025 06:20  Phos  2.9     04-22  Mg     1.8     04-22    TPro  5.2[L]  /  Alb  2.1[L]  /  TBili  0.6  /  DBili  0.2  /  AST  171[H]  /  ALT  26  /  AlkPhos  159[H]  04-23      Urinalysis Basic - ( 23 Apr 2025 06:20 )    Color: x / Appearance: x / SG: x / pH: x  Gluc: 173 mg/dL / Ketone: x  / Bili: x / Urobili: x   Blood: x / Protein: x / Nitrite: x   Leuk Esterase: x / RBC: x / WBC x   Sq Epi: x / Non Sq Epi: x / Bacteria: x        RADIOLOGY & ADDITIONAL STUDIES: HPI:    83 y/o F with known PMHx of metastatic breast Ca currently receiving RT, prior colon Ca s/p R hemicolectomy 2017, advanced dementia (nonverbal) now presented with generalized weakness. The daughter provided collateral history on admisson, reporting that the patient has metastatic breast Ca, received RT in the past, currently being followed by Primary Heme Onc Dr Franz. Per the daughter, the patient has advanced dementia was completely dependent on her daughter for care but over the past x2 weeks, the patient has experienced a decline in her health. As per the daughter, the patient was ambulatory two weeks ago but has since become more bedbound and lethargic.     04/23/25: Seen at bedside accompanied by daughter / HCP, no acute distress, somnolent       PAST MEDICAL & SURGICAL HISTORY:    DM (diabetes mellitus)    Hypothyroidism    Dementia    HLD (hyperlipidemia)    HTN (hypertension)    Breast cancer        MEDICATIONS  (STANDING):    atorvastatin 10 milliGRAM(s) Oral at bedtime  bisacodyl 5 milliGRAM(s) Oral at bedtime  dextrose 5%. 1000 milliLiter(s) (100 mL/Hr) IV Continuous <Continuous>  dextrose 5%. 1000 milliLiter(s) (50 mL/Hr) IV Continuous <Continuous>  dextrose 50% Injectable 25 Gram(s) IV Push once  dextrose 50% Injectable 12.5 Gram(s) IV Push once  dextrose 50% Injectable 25 Gram(s) IV Push once  enoxaparin Injectable 40 milliGRAM(s) SubCutaneous every 24 hours  glucagon  Injectable 1 milliGRAM(s) IntraMuscular once  insulin lispro (ADMELOG) corrective regimen sliding scale   SubCutaneous three times a day before meals  insulin lispro (ADMELOG) corrective regimen sliding scale   SubCutaneous at bedtime  lactulose Syrup 10 Gram(s) Oral daily  levothyroxine 75 MICROGram(s) Oral daily  sodium chloride 0.45% with potassium chloride 20 mEq/L 1000 milliLiter(s) (60 mL/Hr) IV Continuous <Continuous>  tamsulosin 0.4 milliGRAM(s) Oral at bedtime  valsartan 80 milliGRAM(s) Oral daily      MEDICATIONS  (PRN):    acetaminophen     Tablet .. 650 milliGRAM(s) Oral every 6 hours PRN Temp greater or equal to 38C (100.4F), Mild Pain (1 - 3)  aluminum hydroxide/magnesium hydroxide/simethicone Suspension 30 milliLiter(s) Oral every 4 hours PRN Dyspepsia  dextrose Oral Gel 15 Gram(s) Oral once PRN Blood Glucose LESS THAN 70 milliGRAM(s)/deciliter  hydrALAZINE Injectable 10 milliGRAM(s) IV Push every 6 hours PRN SBP > 160  magnesium hydroxide Suspension 30 milliLiter(s) Oral daily PRN Constipation  melatonin 3 milliGRAM(s) Oral at bedtime PRN Insomnia  ondansetron Injectable 4 milliGRAM(s) IV Push every 8 hours PRN Nausea and/or Vomiting        ALLERGIES:    No Known Allergies        FAMILY HISTORY:    Not noted      REVIEW OF SYSTEMS:    Constitutional, Eyes, ENT, Cardiovascular, Respiratory, Gastrointestinal, Genitourinary, Musculoskeletal, Integumentary, Neurological, Psychiatric, Endocrine, Heme/Lymph and Allergic/Immunologic review of systems are otherwise negative except as noted in HPI.       VITALS:     T(C): 37 (23 Apr 2025 08:26), Max: 37.2 (23 Apr 2025 00:50)  T(F): 98.6 (23 Apr 2025 08:26), Max: 98.9 (23 Apr 2025 00:50)  HR: 91 (23 Apr 2025 08:26) (80 - 99)  BP: 164/72 (23 Apr 2025 08:26) (122/89 - 164/72)  BP(mean): --  RR: 18 (23 Apr 2025 08:26) (16 - 18)  SpO2: 97% (23 Apr 2025 08:26) (96% - 97%)    Parameters below as of 23 Apr 2025 08:26  Patient On (Oxygen Delivery Method): nasal cannula  O2 Flow (L/min): 2      PHYSICAL:    Constitutional: elderly, no acute distress   Eyes: no conjunctival infection, anicteric  ENT: pharynx is unremarkable  Neck: supple without JVD  Pulmonary: clear to auscultation bilaterally  Cardiac: RRR  Vascular: no calf tenderness, venous stasis changes  Abdomen: normoactive bowel sounds, soft and nontender  Lymphatic: no peripheral adenopathy appreciated  Musculoskeletal: full range of motion and no deformities appreciated  Skin: normal appearance, no rash  Neurology: sleeping / somnolent, poorly assessed       LABS:    CBC Full  -  ( 22 Apr 2025 10:16 )  WBC Count : 10.01 K/uL  RBC Count : 4.29 M/uL  Hemoglobin : 11.8 g/dL  Hematocrit : 36.2 %  Platelet Count - Automated : 221 K/uL  Mean Cell Volume : 84.4 fl  Mean Cell Hemoglobin : 27.5 pg  Mean Cell Hemoglobin Concentration : 32.6 g/dL  Auto Neutrophil # : x  Auto Lymphocyte # : x  Auto Monocyte # : x  Auto Eosinophil # : x  Auto Basophil # : x  Auto Neutrophil % : x  Auto Lymphocyte % : x  Auto Monocyte % : x  Auto Eosinophil % : x  Auto Basophil % : x    04-23    139  |  107  |  26[H]  ----------------------------<  173[H]  4.3   |  27  |  0.46[L]    Ca    9.9      23 Apr 2025 06:20  Phos  2.9     04-22  Mg     1.8     04-22    TPro  5.2[L]  /  Alb  2.1[L]  /  TBili  0.6  /  DBili  0.2  /  AST  171[H]  /  ALT  26  /  AlkPhos  159[H]  04-23      Urinalysis Basic - ( 23 Apr 2025 06:20 )    Color: x / Appearance: x / SG: x / pH: x  Gluc: 173 mg/dL / Ketone: x  / Bili: x / Urobili: x   Blood: x / Protein: x / Nitrite: x   Leuk Esterase: x / RBC: x / WBC x   Sq Epi: x / Non Sq Epi: x / Bacteria: x        RADIOLOGY & ADDITIONAL STUDIES:

## 2025-04-23 NOTE — PROGRESS NOTE ADULT - SUBJECTIVE AND OBJECTIVE BOX
Subjective:  Chief complain : lethargy , minimally responsive, poor po intake   HPI:  85 y/o female with PMH of metastatic breast cancer triple negative,  (currently receiving radiation therapy), h/o colon cancer status post right hemicolectomy in 2017, advanced vascular dementia, HTN, hypothyroidism, DM2 diet controlled  admitted on 4/19 for  generalized weakness. HPI is limited due to advanced dementia. Daughter at bedside provides collateral history. Over the past two weeks, the patient has experienced a decline in her health. As per the daughter, the patient was ambulatory two weeks ago but has since become more bedbound and lethargic. The patient was found to have an elevated WBC count at a primary care provider visit three days ago and was empirically started on ciprofloxacin without symptom improvement. Today, the patient was noted to have hypoxia to 80% at home by her daughter and was brought to the emergency department for further evaluation. ROS not obtainable due to the patient’s mentation.      In ED BP elevated 173/101, SaO2 99 on 2L NC, VSS otherwise.  CBC: WBC 9, H/H 12/37, Plt 250 CMP: BUN/Cr 31/0.7, Glu 234, Alk phos 172,  Flu/COVID: negative.   UA: Cloudy, proteinuria, ketonuria, large blood, 10 wbcs, few bacteria. CTA chest & CT abd/pelvis: No acute pulmonary embolus. New pulmonary and pleural nodules, compatible with progression of metastatic disease. Progression of osteolytic metastasis, with pathologic fractures of the right 10th and left ninth ribs. Redemonstrated severe right hydronephrosis secondary to an 8 mm stone in the distal right ureter. Unchanged bulky retroperitoneal adenopathy. Unchanged masslike soft tissue in the right breast. Resolution of previously seen right subpectoral and axillary adenopathy.     4/20 -  Patient seen and examined at bedside earlier today, daughter at bedside provides history , pt non-verbal, unable to follow  commands, opens the eyes, not using right arm, + right arm tremor noted, unable to move legs is asked , afebrile,  plan explained at length  4/21 - pt seen and examined , more verbal today, participated with PT, denies pain, poor historian, afebrile  4/22 - lethargy persist, do not use right arm, tolerating some po intake, afebrile, denies pain, plan discussed with daughter at bedside  4/23 - pt seen and examined , lethargic, daughter at bedside, non-verbal, minimal po intake, comfortable in the bed, on O2 , plan discussed in length, daughter do not want proceed with MRI, want to focus on comfort care, setting up hospice at home    Review of system-  unable to obtain due to mental status      Vital sings reviewed for last 24 h  T(C): 36.6 (04-23-25 @ 16:40), Max: 37.2 (04-23-25 @ 00:50)  T(F): 97.9 (04-23-25 @ 16:40), Max: 98.9 (04-23-25 @ 00:50)  HR: 86 (04-23-25 @ 16:40) (80 - 91)  BP: 157/83 (04-23-25 @ 16:40) (155/80 - 164/72)  RR: 20 (04-23-25 @ 16:40) (18 - 20)  SpO2: 96% (04-23-25 @ 16:40) (96% - 97%)  Wt(kg): --  Daily     Daily   CAPILLARY BLOOD GLUCOSE      POCT Blood Glucose.: 189 mg/dL (23 Apr 2025 17:12)  POCT Blood Glucose.: 239 mg/dL (23 Apr 2025 12:53)  POCT Blood Glucose.: 172 mg/dL (23 Apr 2025 08:31)  POCT Blood Glucose.: 222 mg/dL (22 Apr 2025 21:28)          Physical exam:   General : NAD, appear to be of stated age , well groomed , awake, alert   NERVOUS SYSTEM:  Alert & Oriented X0, opens eyes,  not following commands, limited exam, not using RUE, + RUE resting tremor, unable to test sensation and gait  HEAD:  Atraumatic, Normocephalic  EYES: EOMI, PERRLA, conjunctiva and sclera clear  HEENT: dry  mucous membranes, Supple neck , No JVD  CHEST: BS decrease at bases bilaterally; No rales, no rhonchi, no wheezing  HEART: Regular rate and rhythm; No murmurs, no rubs or gallops  ABDOMEN: Soft, RUQ , RLQ tenderness,  Non-distended; Bowel sounds present, no guarding , no peritoneal irritation   GENITOURINARY- Voiding, no suprapubic tenderness  EXTREMITIES:  2+ Peripheral Pulses, No clubbing, cyanosis,   edema  MUSCULOSKELETAL:- No muscle tenderness, Muscle tone normal, No joint tenderness, no Joint swelling,  Joint ROM –normal   SKIN-no rash, no lesion , + right breast hard mass on palpation    Labs radiologic and other test : all reviewed and interpreted :                           11.8   10.01 )-----------( 221      ( 22 Apr 2025 10:16 )             36.2     04-23    139  |  107  |  26[H]  ----------------------------<  173[H]  4.3   |  27  |  0.46[L]    Ca    9.9      23 Apr 2025 06:20  Phos  2.9     04-22  Mg     1.8     04-22    TPro  5.2[L]  /  Alb  2.1[L]  /  TBili  0.6  /  DBili  0.2  /  AST  171[H]  /  ALT  26  /  AlkPhos  159[H]  04-23        LIVER FUNCTIONS - ( 23 Apr 2025 06:20 )  Alb: 2.1 g/dL / Pro: 5.2 gm/dL / ALK PHOS: 159 U/L / ALT: 26 U/L / AST: 171 U/L / GGT: x                       MR Head w/wo IV Cont (04.22.25 @ 08:58) >  IMPRESSION: No evidence of intracranial metastatic disease.    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    Numerous foci of susceptibility artifact scattered throughout the brain   which may reflect areas of chronic microhemorrhage. Underlying amyloid   angiopathy is also a possibility.    Similar-appearing moderate severity chronic white matter microvascular   type changes.          CT Head No Cont (04.20.25 @ 15:43) >  IMPRESSION: No acute intracranial hemorrhage, mass effect, or shift of   the midline structures.    Moderate severity chronic white matter microvascular type changes.      < end of copied text >                 CT Abdomen and Pelvis w/ IV Cont (04.19.25 @ 13:40) >    IMPRESSION:    1. No acute pulmonary embolus.  2. New pulmonary and pleural nodules, compatible with progression of   metastatic disease.  3. Progression of osteolytic metastasis, with pathologic fractures of the   right 10th and left ninth ribs.  4. Redemonstrated severe right hydronephrosis secondary to an 8 mm stone   in the distal right ureter.  5. Unchanged bulky retroperitoneal adenopathy.  6. Unchanged masslike soft tissue in the right breast.  7. Resolution of previously seen right subpectoral and axillary   adenopathy.          RECENT CULTURES:    Urine Microscopic-Add On (NC) (04.19.25 @ 12:50)    White Blood Cell - Urine: 10 /HPF   Red Blood Cell - Urine: 1 /HPF   Bacteria: Few /HPF   Cast: 1 /LPF   Hyaline Casts: None Seen   Epithelial Cells: 2 /HPF   Calcium Oxalate Crystals: Present   Comment - Urine: Few mucus strands        Culture - Urine (04.19.25 @ 12:50)    Specimen Source: Urine None   Culture Results:   No growth      Cardiac testing : reviewed   EKG     12 Lead ECG (04.19.25 @ 14:46) >  Ventricular Rate 79 BPM  QTC Calculation(Bazett) 428 ms   Normal sinus rhythm  Nonspecific T wave abnormality  Abnormal ECG  No previous ECGs available        Procedures :     Devices:   MEDICATIONS  (STANDING):  atorvastatin 10 milliGRAM(s) Oral at bedtime  bisacodyl 5 milliGRAM(s) Oral at bedtime  dextrose 5%. 1000 milliLiter(s) (50 mL/Hr) IV Continuous <Continuous>  dextrose 5%. 1000 milliLiter(s) (100 mL/Hr) IV Continuous <Continuous>  dextrose 50% Injectable 25 Gram(s) IV Push once  dextrose 50% Injectable 12.5 Gram(s) IV Push once  dextrose 50% Injectable 25 Gram(s) IV Push once  enoxaparin Injectable 40 milliGRAM(s) SubCutaneous every 24 hours  glucagon  Injectable 1 milliGRAM(s) IntraMuscular once  insulin lispro (ADMELOG) corrective regimen sliding scale   SubCutaneous three times a day before meals  insulin lispro (ADMELOG) corrective regimen sliding scale   SubCutaneous at bedtime  lactulose Syrup 10 Gram(s) Oral daily  levothyroxine 75 MICROGram(s) Oral daily  sodium chloride 0.45% with potassium chloride 20 mEq/L 1000 milliLiter(s) (60 mL/Hr) IV Continuous <Continuous>  tamsulosin 0.4 milliGRAM(s) Oral at bedtime  valsartan 80 milliGRAM(s) Oral daily    MEDICATIONS  (PRN):  acetaminophen     Tablet .. 650 milliGRAM(s) Oral every 6 hours PRN Temp greater or equal to 38C (100.4F), Mild Pain (1 - 3)  aluminum hydroxide/magnesium hydroxide/simethicone Suspension 30 milliLiter(s) Oral every 4 hours PRN Dyspepsia  dextrose Oral Gel 15 Gram(s) Oral once PRN Blood Glucose LESS THAN 70 milliGRAM(s)/deciliter  hydrALAZINE Injectable 10 milliGRAM(s) IV Push every 6 hours PRN SBP > 160  magnesium hydroxide Suspension 30 milliLiter(s) Oral daily PRN Constipation  melatonin 3 milliGRAM(s) Oral at bedtime PRN Insomnia  ondansetron Injectable 4 milliGRAM(s) IV Push every 8 hours PRN Nausea and/or Vomiting

## 2025-04-23 NOTE — PROGRESS NOTE ADULT - NS ATTEND AMEND GEN_ALL_CORE FT
83 y/o F with known advanced dementia, known TNBC with metastasis currently admitted with worsening functional decline, FTT and non verbal.  Dr Franz did not offer systemic cancer directed therapy given her poor functional status, subsequent to her receiving targeted RT to breast. Patient now admitted with worsening functional status/ mentation. Imaging noted evidence of diffuse osseous mets. Detailed discussion with daughter who is amenable to home hospice.

## 2025-04-23 NOTE — PROGRESS NOTE ADULT - ASSESSMENT
· 85 y/o female with PMH of metastatic breast cancer triple negative,  (currently receiving radiation therapy), h/o colon cancer status post right hemicolectomy in 2017, advanced vascular dementia, HTN, hypothyroidism, DM2 diet controlled  admitted on 4/19 for  generalized weakness. HPI is limited due to advanced dementia. Daughter at bedside provides collateral history. Over the past two weeks, the patient has experienced a decline in her health. As per the daughter, the patient was ambulatory two weeks ago but has since become more bedbound and lethargic. The patient was found to have an elevated WBC count at a primary care provider visit three days ago and was empirically started on ciprofloxacin without symptom improvement. Today, the patient was noted to have hypoxia to 80% at home by her daughter and was brought to the emergency department for further evaluation. ROS not obtainable due to the patient’s mentation.      # Metabolic encephalopathy , multifactorial ruled out CVA, Calvarial lesions/ mets, amyloid angiopathy with micro hemorrhages, moderate to severe vascular  dementia  # Acute hypoxic respiratory failure , Pulmonary nodules, pleural nodules due to metastatic disease ruled out PE   # Pathologic 9 th L and 10 th  R rib fractures with bone metastasis  # Hypercalcemia  # Pyuria, probable partially treated UTI   # Severe right sided hydronephrosis, retroperitoneal LAD  # Nephrolithiasis   # Metastatic triple negative breast cancer with mets to bones, lungs, LAD  - CTA chest & CT abd/pelvis: no PE, +  New pulmonary and pleural nodules, compatible with progression of metastatic disease. Progression of osteolytic metastasis, with pathologic fractures of the right 10th and left ninth ribs. Redemonstrated severe right hydronephrosis secondary to an 8 mm stone in the distal right ureter. Unchanged bulky retroperitoneal adenopathy. Unchanged masslike soft tissue in the right breast. Resolution of previously seen right subpectoral and axillary adenopathy.   - CT head  - moderate to severe cerebrovascular disease   - MRI head -Multiple small enhancing lesions within the calvarium with internal  reduced diffusion for which calvarial metastatic disease is suspected.  Numerous foci of susceptibility artifact scattered throughout the brain  which may reflect areas of chronic microhemorrhage. Underlying amyloid   angiopathy is also a possibility.  Similar-appearing moderate severity chronic white matter microvascular  type changes.    - no antiplatelets due to micro hemorrhages (fronto temporal dementias)  - MR cervical spine - canceled, GOC hospice at home  - check ammonia 41 --> 37 , + bms  - , TSH, vit B12 wnl , folate 5.6 , CRP 33, procalcitonin , free Ca, PTH noted   - O2 supplementation , s/p  IV fluids for hypercalcemia, will stop Ca 9.9  - s/p  Zosyn - will stop 4/21 cultures neg   - hold memantine - can cause sedation  - neurology consult - MRI cervical spine , EEG   - Urology  consult - no need for stent or nephrostomy     # Right sided abdominal pain, constipation  # Liver lesions on CT, GB polyp   # Transaminitis,   - repeat liver function tests  - check lipase wnl, CK wnl,  stop  statin   -  CK wnl, lipid profile LDL 71  - bowel regiment    # Metastatic breast cancer   - Heme/onc consult - hospice appropriate   -Palliative consult. for GOC   - rad onc consult - no role of radiation to calvarial lesions  - plan for home with hospice    #  Dementia. vascular # Dysphagia # RUE tremor   -  Fall and aspiration precautions.  - speech and swallow eval. - pureed diet     #  DM (diabetes mellitus).  diet controlled  - diet purreed with supplements ,   mod-ISF with night time coverage.   Hypoglycemia protocol      #  Hypothyroidism.  - c/w  Levothyroxine. check TSH, free T4      # HTN (hypertension).  lower dose of valsartan 80 chantelle     #  HLD (hyperlipidemia).  - stop  statin, due to elevated LFTs      DVT ppx: lovenox  Turn and reposition q2h to prevent bedsores    Dispo - home with hospice

## 2025-04-23 NOTE — PROGRESS NOTE ADULT - ASSESSMENT
83 y/o woman PMH of metastatic breast cancer, h/o colon cancer, dementia, HTN, hypothyroidism, DM-2; admitted for  weakness, acute hypoxic respiratory failure. Limited exam, demented, R weakness. CT/MR head no acute changes, no mets. EEG c/w encephalopathy, no evidence for acute CVA, mets. MR c spine cancelled.  Suggest:  palliative care    Will sign off case, reconsult if needed.

## 2025-04-24 LAB
ALBUMIN SERPL ELPH-MCNC: 2 G/DL — LOW (ref 3.3–5)
ALP SERPL-CCNC: 171 U/L — HIGH (ref 40–120)
ALT FLD-CCNC: 28 U/L — SIGNIFICANT CHANGE UP (ref 12–78)
AMMONIA BLD-MCNC: 37 UMOL/L — HIGH (ref 11–32)
ANION GAP SERPL CALC-SCNC: 4 MMOL/L — LOW (ref 5–17)
AST SERPL-CCNC: 138 U/L — HIGH (ref 15–37)
BILIRUB DIRECT SERPL-MCNC: 0.2 MG/DL — SIGNIFICANT CHANGE UP (ref 0–0.3)
BILIRUB INDIRECT FLD-MCNC: 0.4 MG/DL — SIGNIFICANT CHANGE UP (ref 0.2–1)
BILIRUB SERPL-MCNC: 0.6 MG/DL — SIGNIFICANT CHANGE UP (ref 0.2–1.2)
BUN SERPL-MCNC: 28 MG/DL — HIGH (ref 7–23)
CALCIUM SERPL-MCNC: 10.5 MG/DL — HIGH (ref 8.5–10.1)
CHLORIDE SERPL-SCNC: 104 MMOL/L — SIGNIFICANT CHANGE UP (ref 96–108)
CO2 SERPL-SCNC: 27 MMOL/L — SIGNIFICANT CHANGE UP (ref 22–31)
CREAT SERPL-MCNC: 0.44 MG/DL — LOW (ref 0.5–1.3)
CRP SERPL-MCNC: 152 MG/L — HIGH (ref 0–5)
CULTURE RESULTS: SIGNIFICANT CHANGE UP
CULTURE RESULTS: SIGNIFICANT CHANGE UP
EGFR: 94 ML/MIN/1.73M2 — SIGNIFICANT CHANGE UP
EGFR: 94 ML/MIN/1.73M2 — SIGNIFICANT CHANGE UP
GLUCOSE BLDC GLUCOMTR-MCNC: 179 MG/DL — HIGH (ref 70–99)
GLUCOSE SERPL-MCNC: 154 MG/DL — HIGH (ref 70–99)
HCT VFR BLD CALC: 33.2 % — LOW (ref 34.5–45)
HGB BLD-MCNC: 10.9 G/DL — LOW (ref 11.5–15.5)
MAGNESIUM SERPL-MCNC: 1.8 MG/DL — SIGNIFICANT CHANGE UP (ref 1.6–2.6)
MCHC RBC-ENTMCNC: 27.6 PG — SIGNIFICANT CHANGE UP (ref 27–34)
MCHC RBC-ENTMCNC: 32.8 G/DL — SIGNIFICANT CHANGE UP (ref 32–36)
MCV RBC AUTO: 84.1 FL — SIGNIFICANT CHANGE UP (ref 80–100)
NRBC # BLD AUTO: 0.02 K/UL — HIGH (ref 0–0)
NRBC # FLD: 0.02 K/UL — HIGH (ref 0–0)
NRBC BLD AUTO-RTO: 0 /100 WBCS — SIGNIFICANT CHANGE UP (ref 0–0)
PHOSPHATE SERPL-MCNC: 2.9 MG/DL — SIGNIFICANT CHANGE UP (ref 2.5–4.5)
PLATELET # BLD AUTO: 222 K/UL — SIGNIFICANT CHANGE UP (ref 150–400)
PMV BLD: 9.5 FL — SIGNIFICANT CHANGE UP (ref 7–13)
POTASSIUM SERPL-MCNC: 4.3 MMOL/L — SIGNIFICANT CHANGE UP (ref 3.5–5.3)
POTASSIUM SERPL-SCNC: 4.3 MMOL/L — SIGNIFICANT CHANGE UP (ref 3.5–5.3)
PROT SERPL-MCNC: 5.3 GM/DL — LOW (ref 6–8.3)
RBC # BLD: 3.95 M/UL — SIGNIFICANT CHANGE UP (ref 3.8–5.2)
RBC # FLD: 14.9 % — HIGH (ref 10.3–14.5)
SODIUM SERPL-SCNC: 135 MMOL/L — SIGNIFICANT CHANGE UP (ref 135–145)
SPECIMEN SOURCE: SIGNIFICANT CHANGE UP
SPECIMEN SOURCE: SIGNIFICANT CHANGE UP
WBC # BLD: 9.55 K/UL — SIGNIFICANT CHANGE UP (ref 3.8–10.5)
WBC # FLD AUTO: 9.55 K/UL — SIGNIFICANT CHANGE UP (ref 3.8–10.5)

## 2025-04-24 PROCEDURE — 99233 SBSQ HOSP IP/OBS HIGH 50: CPT

## 2025-04-24 RX ORDER — DIAZEPAM 2 MG/1
2 TABLET ORAL EVERY 6 HOURS
Refills: 0 | Status: DISCONTINUED | OUTPATIENT
Start: 2025-04-24 | End: 2025-04-25

## 2025-04-24 RX ORDER — LORAZEPAM 4 MG/ML
0.25 VIAL (ML) INJECTION EVERY 6 HOURS
Refills: 0 | Status: DISCONTINUED | OUTPATIENT
Start: 2025-04-24 | End: 2025-04-24

## 2025-04-24 RX ORDER — LEVOTHYROXINE SODIUM 300 MCG
50 TABLET ORAL AT BEDTIME
Refills: 0 | Status: DISCONTINUED | OUTPATIENT
Start: 2025-04-24 | End: 2025-04-25

## 2025-04-24 RX ORDER — ACETAMINOPHEN 500 MG/5ML
1000 LIQUID (ML) ORAL ONCE
Refills: 0 | Status: COMPLETED | OUTPATIENT
Start: 2025-04-24 | End: 2025-04-24

## 2025-04-24 RX ADMIN — Medication 1 MILLIGRAM(S): at 17:51

## 2025-04-24 RX ADMIN — Medication 75 MICROGRAM(S): at 06:05

## 2025-04-24 RX ADMIN — Medication 400 MILLIGRAM(S): at 12:22

## 2025-04-24 RX ADMIN — INSULIN LISPRO 2: 100 INJECTION, SOLUTION INTRAVENOUS; SUBCUTANEOUS at 07:58

## 2025-04-24 RX ADMIN — Medication 50 MICROGRAM(S): at 21:39

## 2025-04-24 NOTE — PROGRESS NOTE ADULT - TIME BILLING
I spend mentioned above total minutes on direct patient care on the date of this encounter . This includes reviewing prior documentation, results and imaging in addition to completing a full history and physical examination on the patient. Further tests, medications, and procedures have been ordered as indicated. Laboratory results and the plan of care were communicated to the patient and/or their family member. Supporting documentation was completed and added to the patient's chart.   .

## 2025-04-24 NOTE — CHART NOTE - NSCHARTNOTEFT_GEN_A_CORE
Daughter Summer, the designated healthcare proxy, was engaged in a discussion regarding the patient's goals of care. The patient’s clinical status and prognosis were reviewed during the conversation. Summer verbally consented to change the patient’s code status to Do Not Resuscitate and Do Not Intubate, with the option for non-invasive positive pressure ventilation as appropriate. This decision was made based on her understanding of the patient’s wishes and current medical condition. Summer was provided with the opportunity to ask questions and expressed that she felt informed and supported in making this decision. Documentation of the code status change has been completed accordingly. Palliative consulted for further discussion about home hospice.
Met with daughter Melissa at the bedside to further discuss discharge plans.  Melissa shared plan for inpatient hospice.  This SW noted the qualifications for inpatient hospice.  IV medications in place.  Daughter expressed concern with Pts pain and returning to the community.  Referral to be placed to [facility].  Will continue to follow up with daughter to determine plans and offer support.  Daughter aware of team availability.
HPI:  84-year-old female with a past medical history of metastatic breast cancer (currently receiving radiation therapy), type 2 diabetes mellitus, colon cancer status post right hemicolectomy in 2017, HTN, hypothyroidism, and advanced dementia (nonverbal) presents with generalized weakness. HPI is limited due to advanced dementia. Daughter at bedside provides collateral history, reporting that the patient has metastatic breast cancer and has received radiation in the past. She is currently being followed by Dr. Riddle as an outpatient for monitoring. The patient has advanced dementia and is completely dependent on her daughter for care. Over the past two weeks, the patient has experienced a decline in her health. As per the daughter, the patient was ambulatory two weeks ago but has since become more bedbound and lethargic. The patient was found to have an elevated WBC count at a primary care provider visit three days ago and was empirically started on ciprofloxacin without symptom improvement. Today, the patient was noted to have hypoxia to 80% at home by her daughter and was brought to the emergency department for further evaluation. ROS not obtainable due to the patient’s mentation.  (19 Apr 2025 18:06)      PERTINENT PMH REVIEWED:  [ x ] YES [ ] NO           Primary Contact:          maia Torres, #[phone]    HCP [  ] Surrogate [  x ] Guardian [   ]    Mental Status: [ ] Alert  [  ] Oriented [ x ] Confused [  ] Lethargic  Concerns of Depression [  ] -not identified  Anxiety [   ] -not identified  Baseline ADLs (prior to admission):  Independent [ ] moderately [ ] fully   Dependent   [ ] moderately [ x ]fully    Family Meeting attendees:  GOC discussed with Palliative NP Gisel Crouch    Anticipated Grief: Patient[  ] Family [ x ]    Caregiver Carman Assessed: Yes [ x ] No [  ]    Worship: Decline    Spiritual Concerns: Not identified,  available for support.    Goals of Care: To be further discussed    Previous Services: [facility]    ADVANCE DIRECTIVES:    -Pt lacks capacity  -GOC done with maia Wheeler  -YOVANI DNR/DNI/TrailNIV on chart      Anticipated D/C Plan: to be further determined.                     Summary:  Palliative SW met with Melissa carvalho at the bedside to follow up and offer support.  Palliative SW role explained.  Emotional support provided.  Prior to hospitalization, Pt resides at home with daughter and her family.  Pt lacks capacity, minimal communication.  Daughter shared they provide assistance with ADLs.  Daughter shared Pt has been declining the recent weeks.  She shared Pts last radiation treatment was in February.  She shared how difficult making decisions have been at times.  GOC discussed earlier this date with Palliative NP, Gisel crouch.  Plan at this time to return home with daughter and family.  GOC to be discussed further tomorrow.  Daughter states Pt will be going for an MRI this date.  Daughters feelings explored. Support provided.  Will follow up to further discuss GOC and offer support.

## 2025-04-24 NOTE — PROGRESS NOTE ADULT - ASSESSMENT
· 83 y/o female with PMH of metastatic breast cancer triple negative,  (currently receiving radiation therapy), h/o colon cancer status post right hemicolectomy in 2017, advanced vascular dementia, HTN, hypothyroidism, DM2 diet controlled  admitted on 4/19 for  generalized weakness. HPI is limited due to advanced dementia. Daughter at bedside provides collateral history. Over the past two weeks, the patient has experienced a decline in her health. As per the daughter, the patient was ambulatory two weeks ago but has since become more bedbound and lethargic. The patient was found to have an elevated WBC count at a primary care provider visit three days ago and was empirically started on ciprofloxacin without symptom improvement. Today, the patient was noted to have hypoxia to 80% at home by her daughter and was brought to the emergency department for further evaluation. ROS not obtainable due to the patient’s mentation.      # Metabolic encephalopathy , multifactorial ruled out CVA, Calvarial lesions/ mets, amyloid angiopathy with micro hemorrhages, moderate to severe vascular  dementia  # Acute hypoxic respiratory failure , Pulmonary nodules, pleural nodules due to metastatic disease ruled out PE   # Pathologic 9 th L and 10 th  R rib fractures with bone metastasis  # Hypercalcemia of malignancy   # Pyuria, probable partially treated UTI   # Severe right sided hydronephrosis, retroperitoneal LAD  # Nephrolithiasis   # Metastatic triple negative breast cancer with mets to bones, lungs, LAD  - CTA chest & CT abd/pelvis: no PE, +  New pulmonary and pleural nodules, compatible with progression of metastatic disease. Progression of osteolytic metastasis, with pathologic fractures of the right 10th and left ninth ribs. Redemonstrated severe right hydronephrosis secondary to an 8 mm stone in the distal right ureter. Unchanged bulky retroperitoneal adenopathy. Unchanged masslike soft tissue in the right breast. Resolution of previously seen right subpectoral and axillary adenopathy.   - CT head  - moderate to severe cerebrovascular disease   - MRI head -Multiple small enhancing lesions within the calvarium with internal  reduced diffusion for which calvarial metastatic disease is suspected.  Numerous foci of susceptibility artifact scattered throughout the brain  which may reflect areas of chronic microhemorrhage. Underlying amyloid   angiopathy is also a possibility.  Similar-appearing moderate severity chronic white matter microvascular  type changes.    - no antiplatelets due to micro hemorrhages (fronto temporal dementias)  - MR cervical spine - canceled, GOC hospice at home  - check ammonia 41 --> 37 , + bms  - , TSH, vit B12 wnl , folate 5.6 , CRP 33, procalcitonin , free Ca, PTH noted   - O2 supplementation , s/p  IV fluids for hypercalcemia, will stop Ca 9.9  - s/p  Zosyn - will stop 4/21 cultures neg   - hold memantine - can cause sedation  - neurology consult - MRI cervical spine , EEG   - Urology  consult - no need for stent or nephrostomy   - 4/24 - lethargic , not able to tolerate po meds, not able to swallow , hospice care discussed, comfort at this point , IV meds prn for dyspnea, pain, nausea,    # Right sided abdominal pain, constipation  # Liver lesions on CT, GB polyp   # Transaminitis,   - repeat liver function tests  - check lipase wnl, CK wnl,  stop  statin   -  CK wnl, lipid profile LDL 71  - bowel regiment    # Metastatic breast cancer   - Heme/onc consult - hospice appropriate   -Palliative consult. for GOC   - rad onc consult - no role of radiation to calvarial lesions  - plan for home with hospice    #  Dementia. vascular # Dysphagia # RUE tremor   -  Fall and aspiration precautions.  - speech and swallow eval. - pureed diet - pleasure feeds only     #  DM (diabetes mellitus).  diet controlled  - diet purreed with supplements ,   mod-ISF with night time coverage.   Hypoglycemia protocol      #  Hypothyroidism.  - c/w  Levothyroxine po --> IV . check TSH, free T4      # HTN (hypertension).  stop po valsartan --> IV hydralazine prn    #  HLD (hyperlipidemia).  - stop  statin, due to elevated LFTs      DVT ppx: lovenox  Turn and reposition q2h to prevent bedsores    Dispo - grave prognosis, actively dying, stop PO meds use prn meds, O2

## 2025-04-24 NOTE — PROGRESS NOTE ADULT - SUBJECTIVE AND OBJECTIVE BOX
Subjective:  Chief complain : lethargy , minimally responsive, poor po intake   HPI:  83 y/o female with PMH of metastatic breast cancer triple negative,  (currently receiving radiation therapy), h/o colon cancer status post right hemicolectomy in 2017, advanced vascular dementia, HTN, hypothyroidism, DM2 diet controlled  admitted on 4/19 for  generalized weakness. HPI is limited due to advanced dementia. Daughter at bedside provides collateral history. Over the past two weeks, the patient has experienced a decline in her health. As per the daughter, the patient was ambulatory two weeks ago but has since become more bedbound and lethargic. The patient was found to have an elevated WBC count at a primary care provider visit three days ago and was empirically started on ciprofloxacin without symptom improvement. Today, the patient was noted to have hypoxia to 80% at home by her daughter and was brought to the emergency department for further evaluation. ROS not obtainable due to the patient’s mentation.      In ED BP elevated 173/101, SaO2 99 on 2L NC, VSS otherwise.  CBC: WBC 9, H/H 12/37, Plt 250 CMP: BUN/Cr 31/0.7, Glu 234, Alk phos 172,  Flu/COVID: negative.   UA: Cloudy, proteinuria, ketonuria, large blood, 10 wbcs, few bacteria. CTA chest & CT abd/pelvis: No acute pulmonary embolus. New pulmonary and pleural nodules, compatible with progression of metastatic disease. Progression of osteolytic metastasis, with pathologic fractures of the right 10th and left ninth ribs. Redemonstrated severe right hydronephrosis secondary to an 8 mm stone in the distal right ureter. Unchanged bulky retroperitoneal adenopathy. Unchanged masslike soft tissue in the right breast. Resolution of previously seen right subpectoral and axillary adenopathy.     4/20 -  Patient seen and examined at bedside earlier today, daughter at bedside provides history , pt non-verbal, unable to follow  commands, opens the eyes, not using right arm, + right arm tremor noted, unable to move legs is asked , afebrile,  plan explained at length  4/21 - pt seen and examined , more verbal today, participated with PT, denies pain, poor historian, afebrile  4/22 - lethargy persist, do not use right arm, tolerating some po intake, afebrile, denies pain, plan discussed with daughter at bedside  4/23 - pt seen and examined , lethargic, daughter at bedside, non-verbal, minimal po intake, comfortable in the bed, on O2 , plan discussed in length, daughter do not want proceed with MRI, want to focus on comfort care, setting up hospice at home  4/24 - pt seen and examined , + lethargic, non-verbal unable to swallow medications , + pain on turning and repositioning, minimal po intake for pleasure only    Review of system-  unable to obtain due to mental status     Vital sings reviewed for last 24 h  T(C): 36.8 (04-24-25 @ 08:09), Max: 36.8 (04-24-25 @ 08:09)  T(F): 98.3 (04-24-25 @ 08:09), Max: 98.3 (04-24-25 @ 08:09)  HR: 77 (04-24-25 @ 10:55) (77 - 86)  BP: 153/78 (04-24-25 @ 10:55) (153/78 - 172/77)  RR: 19 (04-24-25 @ 10:55) (19 - 20)  SpO2: 97% (04-24-25 @ 10:55) (95% - 97%)  Wt(kg): --  Daily     Daily   CAPILLARY BLOOD GLUCOSE      POCT Blood Glucose.: 179 mg/dL (24 Apr 2025 07:48)  POCT Blood Glucose.: 162 mg/dL (23 Apr 2025 21:49)  POCT Blood Glucose.: 189 mg/dL (23 Apr 2025 17:12)        Physical exam:   General : NAD, appear to be of stated age , well groomed , awake, alert   NERVOUS SYSTEM:  Alert & Oriented X0, opens eyes,  not following commands, limited exam, not using RUE, + RUE resting tremor, unable to test sensation and gait  HEAD:  Atraumatic, Normocephalic  EYES: EOMI, PERRLA, conjunctiva and sclera clear  HEENT: dry  mucous membranes, Supple neck , No JVD  CHEST: BS decrease at bases bilaterally; No rales, no rhonchi, no wheezing  HEART: Regular rate and rhythm; No murmurs, no rubs or gallops  ABDOMEN: Soft, RUQ , RLQ tenderness,  Non-distended; Bowel sounds present, no guarding , no peritoneal irritation   GENITOURINARY- Voiding, no suprapubic tenderness  EXTREMITIES:  2+ Peripheral Pulses, No clubbing, cyanosis,   edema  MUSCULOSKELETAL:- No muscle tenderness, Muscle tone normal, No joint tenderness, no Joint swelling,  Joint ROM –normal   SKIN-no rash, no lesion , + right breast hard mass on palpation    Labs radiologic and other test : all reviewed and interpreted :                         10.9   9.55  )-----------( 222      ( 24 Apr 2025 04:49 )             33.2     04-24    135  |  104  |  28[H]  ----------------------------<  154[H]  4.3   |  27  |  0.44[L]    Ca    10.5[H]      24 Apr 2025 04:49  Phos  2.9     04-24  Mg     1.8     04-24    TPro  5.3[L]  /  Alb  2.0[L]  /  TBili  0.6  /  DBili  0.2  /  AST  138[H]  /  ALT  28  /  AlkPhos  171[H]  04-24        LIVER FUNCTIONS - ( 24 Apr 2025 04:49 )  Alb: 2.0 g/dL / Pro: 5.3 gm/dL / ALK PHOS: 171 U/L / ALT: 28 U/L / AST: 138 U/L / GGT: x                       MR Head w/wo IV Cont (04.22.25 @ 08:58) >  IMPRESSION: No evidence of intracranial metastatic disease.    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    Numerous foci of susceptibility artifact scattered throughout the brain   which may reflect areas of chronic microhemorrhage. Underlying amyloid   angiopathy is also a possibility.    Similar-appearing moderate severity chronic white matter microvascular   type changes.          CT Head No Cont (04.20.25 @ 15:43) >  IMPRESSION: No acute intracranial hemorrhage, mass effect, or shift of   the midline structures.    Moderate severity chronic white matter microvascular type changes.      < end of copied text >                 CT Abdomen and Pelvis w/ IV Cont (04.19.25 @ 13:40) >    IMPRESSION:    1. No acute pulmonary embolus.  2. New pulmonary and pleural nodules, compatible with progression of   metastatic disease.  3. Progression of osteolytic metastasis, with pathologic fractures of the   right 10th and left ninth ribs.  4. Redemonstrated severe right hydronephrosis secondary to an 8 mm stone   in the distal right ureter.  5. Unchanged bulky retroperitoneal adenopathy.  6. Unchanged masslike soft tissue in the right breast.  7. Resolution of previously seen right subpectoral and axillary   adenopathy.          RECENT CULTURES:    Urine Microscopic-Add On (NC) (04.19.25 @ 12:50)    White Blood Cell - Urine: 10 /HPF   Red Blood Cell - Urine: 1 /HPF   Bacteria: Few /HPF   Cast: 1 /LPF   Hyaline Casts: None Seen   Epithelial Cells: 2 /HPF   Calcium Oxalate Crystals: Present   Comment - Urine: Few mucus strands        Culture - Urine (04.19.25 @ 12:50)    Specimen Source: Urine None   Culture Results:   No growth      Cardiac testing : reviewed   EKG     12 Lead ECG (04.19.25 @ 14:46) >  Ventricular Rate 79 BPM  QTC Calculation(Bazett) 428 ms   Normal sinus rhythm  Nonspecific T wave abnormality  Abnormal ECG  No previous ECGs available        Procedures :     Devices:   MEDICATIONS  (STANDING):  atorvastatin 10 milliGRAM(s) Oral at bedtime  bisacodyl 5 milliGRAM(s) Oral at bedtime  dextrose 5%. 1000 milliLiter(s) (50 mL/Hr) IV Continuous <Continuous>  dextrose 5%. 1000 milliLiter(s) (100 mL/Hr) IV Continuous <Continuous>  dextrose 50% Injectable 25 Gram(s) IV Push once  dextrose 50% Injectable 12.5 Gram(s) IV Push once  dextrose 50% Injectable 25 Gram(s) IV Push once  enoxaparin Injectable 40 milliGRAM(s) SubCutaneous every 24 hours  glucagon  Injectable 1 milliGRAM(s) IntraMuscular once  insulin lispro (ADMELOG) corrective regimen sliding scale   SubCutaneous three times a day before meals  insulin lispro (ADMELOG) corrective regimen sliding scale   SubCutaneous at bedtime  lactulose Syrup 10 Gram(s) Oral daily  levothyroxine 75 MICROGram(s) Oral daily  sodium chloride 0.45% with potassium chloride 20 mEq/L 1000 milliLiter(s) (60 mL/Hr) IV Continuous <Continuous>  tamsulosin 0.4 milliGRAM(s) Oral at bedtime  valsartan 80 milliGRAM(s) Oral daily    MEDICATIONS  (PRN):  acetaminophen     Tablet .. 650 milliGRAM(s) Oral every 6 hours PRN Temp greater or equal to 38C (100.4F), Mild Pain (1 - 3)  aluminum hydroxide/magnesium hydroxide/simethicone Suspension 30 milliLiter(s) Oral every 4 hours PRN Dyspepsia  dextrose Oral Gel 15 Gram(s) Oral once PRN Blood Glucose LESS THAN 70 milliGRAM(s)/deciliter  hydrALAZINE Injectable 10 milliGRAM(s) IV Push every 6 hours PRN SBP > 160  magnesium hydroxide Suspension 30 milliLiter(s) Oral daily PRN Constipation  melatonin 3 milliGRAM(s) Oral at bedtime PRN Insomnia  ondansetron Injectable 4 milliGRAM(s) IV Push every 8 hours PRN Nausea and/or Vomiting

## 2025-04-25 ENCOUNTER — TRANSCRIPTION ENCOUNTER (OUTPATIENT)
Age: 85
End: 2025-04-25

## 2025-04-25 ENCOUNTER — NON-APPOINTMENT (OUTPATIENT)
Age: 85
End: 2025-04-25

## 2025-04-25 VITALS
DIASTOLIC BLOOD PRESSURE: 93 MMHG | TEMPERATURE: 99 F | SYSTOLIC BLOOD PRESSURE: 168 MMHG | RESPIRATION RATE: 18 BRPM | HEART RATE: 78 BPM | OXYGEN SATURATION: 98 %

## 2025-04-25 PROCEDURE — 99239 HOSP IP/OBS DSCHRG MGMT >30: CPT

## 2025-04-25 PROCEDURE — 99232 SBSQ HOSP IP/OBS MODERATE 35: CPT

## 2025-04-25 RX ORDER — LEVOTHYROXINE SODIUM 300 MCG
1 TABLET ORAL
Refills: 0 | DISCHARGE

## 2025-04-25 RX ORDER — DIAZEPAM 2 MG/1
2 TABLET ORAL
Qty: 0 | Refills: 0 | DISCHARGE
Start: 2025-04-25

## 2025-04-25 RX ORDER — LEVOTHYROXINE SODIUM 300 MCG
50 TABLET ORAL
Qty: 0 | Refills: 0 | DISCHARGE
Start: 2025-04-25

## 2025-04-25 RX ORDER — ONDANSETRON HCL/PF 4 MG/2 ML
4 VIAL (ML) INJECTION
Qty: 0 | Refills: 0 | DISCHARGE
Start: 2025-04-25

## 2025-04-25 RX ADMIN — Medication 1 MILLIGRAM(S): at 12:45

## 2025-04-25 NOTE — DISCHARGE NOTE PROVIDER - NSDCFUSCHEDAPPT_GEN_ALL_CORE_FT
[facility]  UROLOGY [address]  Scheduled Appointment: 07/07/2025    Vipul Khan  [facility]  UROLOGY [address]  Scheduled Appointment: 07/07/2025

## 2025-04-25 NOTE — PROGRESS NOTE ADULT - SUBJECTIVE AND OBJECTIVE BOX
HPI: pt seen and examined with daughter at bedside patient appears comfortable received dose of morphine. Patient briefly open eyes but did not answer questions and the closed them. plan is for hospice. Emotional Support provided       PAIN: (x )Yes   ( )No  moaning but otherwise unable to answer     DYSPNEA: ( x) Yes  ( ) No  Level:    Review of Systems:    Unable to obtain/Limited due to: AMS    PHYSICAL EXAM:    Vital Signs Last 24 Hrs  T(C): 36.2 (24 Apr 2025 17:04), Max: 36.2 (24 Apr 2025 17:04)  T(F): 97.2 (24 Apr 2025 17:04), Max: 97.2 (24 Apr 2025 17:04)  HR: 70 (24 Apr 2025 17:04) (70 - 79)  BP: 152/80 (24 Apr 2025 17:04) (126/71 - 153/78)  RR: 18 (24 Apr 2025 17:04) (18 - 19)  SpO2: 97% (24 Apr 2025 17:04) (97% - 99%)    Parameters below as of 24 Apr 2025 17:04  Patient On (Oxygen Delivery Method): nasal cannula    PPSV2: 30  %  FAST: 7D    General: Elderly female in bed in NAD  Mental Status: Awake/non verbal  HEENT: oral mucosa moist  Lungs: clear to auscultation brodie  Cardiac: S1S2+  GI: Abd soft NT ND + BS  : voids  Ext: GOLD on bed  Neuro: dementia    LABS:                        10.9   9.55  )-----------( 222      ( 24 Apr 2025 04:49 )             33.2     04-24    135  |  104  |  28[H]  ----------------------------<  154[H]  4.3   |  27  |  0.44[L]    Ca    10.5[H]      24 Apr 2025 04:49  Phos  2.9     04-24  Mg     1.8     04-24    TPro  5.3[L]  /  Alb  2.0[L]  /  TBili  0.6  /  DBili  0.2  /  AST  138[H]  /  ALT  28  /  AlkPhos  171[H]  04-24      Albumin: Albumin: 2.0 g/dL (04-24 @ 04:49)      Allergies    No Known Allergies    Intolerances      MEDICATIONS  (STANDING):  dextrose 5%. 1000 milliLiter(s) (100 mL/Hr) IV Continuous <Continuous>  dextrose 5%. 1000 milliLiter(s) (50 mL/Hr) IV Continuous <Continuous>  dextrose 50% Injectable 25 Gram(s) IV Push once  dextrose 50% Injectable 12.5 Gram(s) IV Push once  dextrose 50% Injectable 25 Gram(s) IV Push once  glucagon  Injectable 1 milliGRAM(s) IntraMuscular once  levothyroxine Injectable 50 MICROGram(s) IV Push at bedtime    MEDICATIONS  (PRN):  dextrose Oral Gel 15 Gram(s) Oral once PRN Blood Glucose LESS THAN 70 milliGRAM(s)/deciliter  diazepam  Injectable 2 milliGRAM(s) IV Push every 6 hours PRN agitation, anxiety  hydrALAZINE Injectable 10 milliGRAM(s) IV Push every 6 hours PRN SBP > 160  morphine  - Injectable 1 milliGRAM(s) IV Push every 2 hours PRN dyspnea or pain  ondansetron Injectable 4 milliGRAM(s) IV Push every 8 hours PRN Nausea and/or Vomiting      RADIOLOGY:

## 2025-04-25 NOTE — DISCHARGE NOTE PROVIDER - NSDCCPCAREPLAN_GEN_ALL_CORE_FT
PRINCIPAL DISCHARGE DIAGNOSIS  Diagnosis: Breast cancer  Assessment and Plan of Treatment: with metastasis to ribs, bones, lung , LAD , liver lesion  severe hydronephrosis, hypercalcemia of malignancy   to inpatient hospice for comfort care      SECONDARY DISCHARGE DIAGNOSES  Diagnosis: UTI (urinary tract infection)  Assessment and Plan of Treatment: s/p IV abx, urine culture negative    Diagnosis: Multiple fractures of ribs  Assessment and Plan of Treatment: IV morphine as needed   IV diazepam for anxiety    Diagnosis: Dysphagia  Assessment and Plan of Treatment: pleasure feeds

## 2025-04-25 NOTE — PROGRESS NOTE ADULT - PROVIDER SPECIALTY LIST ADULT
Heme/Onc
Neurology
Neurology
Urology
Hospitalist
Palliative Care
Hospitalist
Palliative Care

## 2025-04-25 NOTE — DISCHARGE NOTE PROVIDER - HOSPITAL COURSE
Chief complain : lethargy , minimally responsive, poor po intake   HPI:  83 y/o female with PMH of metastatic breast cancer triple negative,  (currently receiving radiation therapy), h/o colon cancer status post right hemicolectomy in 2017, advanced vascular dementia, HTN, hypothyroidism, DM2 diet controlled  admitted on 4/19 for  generalized weakness. HPI is limited due to advanced dementia. Daughter at bedside provides collateral history. Over the past two weeks, the patient has experienced a decline in her health. As per the daughter, the patient was ambulatory two weeks ago but has since become more bedbound and lethargic. The patient was found to have an elevated WBC count at a primary care provider visit three days ago and was empirically started on ciprofloxacin without symptom improvement. Today, the patient was noted to have hypoxia to 80% at home by her daughter and was brought to the emergency department for further evaluation. ROS not obtainable due to the patient’s mentation.      In ED BP elevated 173/101, SaO2 99 on 2L NC, VSS otherwise.  CBC: WBC 9, H/H 12/37, Plt 250 CMP: BUN/Cr 31/0.7, Glu 234, Alk phos 172,  Flu/COVID: negative.   UA: Cloudy, proteinuria, ketonuria, large blood, 10 wbcs, few bacteria. CTA chest & CT abd/pelvis: No acute pulmonary embolus. New pulmonary and pleural nodules, compatible with progression of metastatic disease. Progression of osteolytic metastasis, with pathologic fractures of the right 10th and left ninth ribs. Redemonstrated severe right hydronephrosis secondary to an 8 mm stone in the distal right ureter. Unchanged bulky retroperitoneal adenopathy. Unchanged masslike soft tissue in the right breast. Resolution of previously seen right subpectoral and axillary adenopathy.     4/20 -  Patient seen and examined at bedside earlier today, daughter at bedside provides history , pt non-verbal, unable to follow  commands, opens the eyes, not using right arm, + right arm tremor noted, unable to move legs is asked , afebrile,  plan explained at length  4/21 - pt seen and examined , more verbal today, participated with PT, denies pain, poor historian, afebrile  4/22 - lethargy persist, do not use right arm, tolerating some po intake, afebrile, denies pain, plan discussed with daughter at bedside  4/23 - pt seen and examined , lethargic, daughter at bedside, non-verbal, minimal po intake, comfortable in the bed, on O2 , plan discussed in length, daughter do not want proceed with MRI, want to focus on comfort care, setting up hospice at home  4/24 - pt seen and examined , + lethargic, non-verbal unable to swallow medications , + pain on turning and repositioning, minimal po intake for pleasure only  4/25 - confused, less lethargic , in distress when repositioning, no to minimal po intake, poor historian, daughter at bedside, plan discussed   Review of system-  unable to obtain due to mental status   Vital sings reviewed for last 24 h  T(C): 36.2 (04-24-25 @ 17:04), Max: 36.2 (04-24-25 @ 17:04)  T(F): 97.2 (04-24-25 @ 17:04), Max: 97.2 (04-24-25 @ 17:04)  HR: 70 (04-24-25 @ 17:04) (70 - 79)  BP: 152/80 (04-24-25 @ 17:04) (126/71 - 152/80)  RR: 18 (04-24-25 @ 17:04) (18 - 19)  SpO2: 97% (04-24-25 @ 17:04) (97% - 99%)  Physical exam:   General : NAD, appear to be of stated age , well groomed , awake, alert   NERVOUS SYSTEM:  Alert & Oriented X0, opens eyes,  not following commands, limited exam, not using RUE, + RUE resting tremor, unable to test sensation and gait  HEAD:  Atraumatic, Normocephalic  EYES: EOMI, PERRLA, conjunctiva and sclera clear  HEENT: dry  mucous membranes, Supple neck , No JVD  CHEST: BS decrease at bases bilaterally; No rales, no rhonchi, no wheezing  HEART: Regular rate and rhythm; No murmurs, no rubs or gallops  ABDOMEN: Soft, RUQ , RLQ tenderness,  Non-distended; Bowel sounds present, no guarding , no peritoneal irritation   GENITOURINARY- Voiding, no suprapubic tenderness  EXTREMITIES:  2+ Peripheral Pulses, No clubbing, cyanosis,   edema  MUSCULOSKELETAL:- No muscle tenderness, Muscle tone normal, No joint tenderness, no Joint swelling,  Joint ROM –normal   SKIN-no rash, no lesion , + right breast hard mass on palpation    Labs radiologic and other test : all reviewed and interpreted    83 y/o female with PMH of metastatic breast cancer triple negative,  (currently receiving radiation therapy), h/o colon cancer status post right hemicolectomy in 2017, advanced vascular dementia, HTN, hypothyroidism, DM2 diet controlled  admitted on 4/19 for  generalized weakness. HPI is limited due to advanced dementia. Daughter at bedside provides collateral history. Over the past two weeks, the patient has experienced a decline in her health. As per the daughter, the patient was ambulatory two weeks ago but has since become more bedbound and lethargic. The patient was found to have an elevated WBC count at a primary care provider visit three days ago and was empirically started on ciprofloxacin without symptom improvement. Today, the patient was noted to have hypoxia to 80% at home by her daughter and was brought to the emergency department for further evaluation. ROS not obtainable due to the patient’s mentation.      # Metabolic encephalopathy , multifactorial ruled out CVA, Calvarial lesions/ mets, amyloid angiopathy with micro hemorrhages, moderate to severe vascular  dementia  # Acute hypoxic respiratory failure , Pulmonary nodules, pleural nodules due to metastatic disease ruled out PE   # Pathologic 9 th L and 10 th  R rib fractures with bone metastasis  # Hypercalcemia of malignancy   # Pyuria, probable partially treated UTI   # Severe right sided hydronephrosis, retroperitoneal LAD  # Nephrolithiasis   # Metastatic triple negative breast cancer with mets to bones, lungs, LAD  - CTA chest & CT abd/pelvis: no PE, +  New pulmonary and pleural nodules, compatible with progression of metastatic disease. Progression of osteolytic metastasis, with pathologic fractures of the right 10th and left ninth ribs. Redemonstrated severe right hydronephrosis secondary to an 8 mm stone in the distal right ureter. Unchanged bulky retroperitoneal adenopathy. Unchanged masslike soft tissue in the right breast. Resolution of previously seen right subpectoral and axillary adenopathy.   - CT head  - moderate to severe cerebrovascular disease   - MRI head -Multiple small enhancing lesions within the calvarium with internal  reduced diffusion for which calvarial metastatic disease is suspected.  Numerous foci of susceptibility artifact scattered throughout the brain  which may reflect areas of chronic microhemorrhage. Underlying amyloid   angiopathy is also a possibility.  Similar-appearing moderate severity chronic white matter microvascular  type changes.    - no antiplatelets due to micro hemorrhages (fronto temporal dementias)  - MR cervical spine - canceled, [facility] hospice at home  - check ammonia 41 --> 37 , + bms  - , TSH, vit B12 wnl , folate 5.6 , CRP 33, procalcitonin , free Ca, PTH noted   - O2 supplementation , s/p  IV fluids for hypercalcemia, will stop Ca 9.9  - s/p  Zosyn - will stop 4/21 cultures neg   - hold memantine - can cause sedation  - 4/24 - lethargic , not able to tolerate po meds, not able to swallow , hospice care discussed, comfort at this point , IV meds prn for dyspnea, pain, nausea,  # Right sided abdominal pain, constipation  # Liver lesions on CT, GB polyp   # Transaminitis,   - repeat liver function tests,  check lipase wnl, CK wnl,  stop  statin ,   CK wnl, lipid profile LDL 71,  bowel regiment  # Metastatic breast cancer   - Heme/onc consult - hospice appropriate , Palliative consult. for [facility] ,  rad onc consult - no role of radiation to calvarial lesions,  plan for home with hospice  #  Dementia. vascular # Dysphagia # RUE tremor   -  Fall and aspiration precautions.,  speech and swallow eval. - pureed diet - pleasure feeds only   #  DM (diabetes mellitus).  diet controlled  - diet purreed with supplements ,   mod-ISF with night time coverage.   Hypoglycemia protocol  #  Hypothyroidism.  - c/w  Levothyroxine po --> IV . check TSH, free T4  # HTN (hypertension).  stop po valsartan --> IV hydralazine prn  #  HLD (hyperlipidemia).  - stop  statin, due to elevated LFTs   dispo - plan for inpatient hospice transfer  55 minutes spend

## 2025-04-25 NOTE — DISCHARGE NOTE NURSING/CASE MANAGEMENT/SOCIAL WORK - PATIENT PORTAL LINK FT
You can access the FollowMyHealth Patient Portal offered by [facility] by registering at the following website: [email or web address]. By joining [facility]’s FollowMyHealth portal, you will also be able to view your health information using other applications (apps) compatible with our system.

## 2025-04-25 NOTE — PROGRESS NOTE ADULT - NUTRITIONAL ASSESSMENT
This patient has been assessed with a concern for Malnutrition and has been determined to have a diagnosis/diagnoses of Severe protein-calorie malnutrition.    This patient is being managed with:   Diet Pureed-  Supplement Feeding Modality:  Oral  Ensure Plant-Based Cans or Servings Per Day:  3       Frequency:  Daily  Entered: Apr 20 2025  3:39PM  
This patient has been assessed with a concern for Malnutrition and has been determined to have a diagnosis/diagnoses of Severe protein-calorie malnutrition.    This patient is being managed with:   Diet Regular-  Entered: Apr 19 2025  4:28PM

## 2025-04-25 NOTE — DISCHARGE NOTE NURSING/CASE MANAGEMENT/SOCIAL WORK - NSDCPEFALRISK_GEN_ALL_CORE
For information on Fall & Injury Prevention, visit: [email or web address] OR  [email or web address] OR  https://www.cdc.gov/steadi/patient.html

## 2025-04-25 NOTE — DISCHARGE NOTE PROVIDER - DETAILS OF MALNUTRITION DIAGNOSIS/DIAGNOSES
This patient has been assessed with a concern for Malnutrition and was treated during this hospitalization for the following Nutrition diagnosis/diagnoses:     -  04/20/2025: Severe protein-calorie malnutrition

## 2025-04-25 NOTE — DISCHARGE NOTE NURSING/CASE MANAGEMENT/SOCIAL WORK - FINANCIAL ASSISTANCE
[facility] provides services at a reduced cost to those who are determined to be eligible through [facility]’s financial assistance program. Information regarding [facility]’s financial assistance program can be found by going to [email or web address] or by calling [phone].

## 2025-04-25 NOTE — PROGRESS NOTE ADULT - ASSESSMENT
84-year-old female with a past medical history of metastatic breast cancer (currently receiving radiation therapy), type 2 diabetes mellitus, colon cancer status post right hemicolectomy in 2017, HTN, hypothyroidism, and advanced dementia (nonverbal) presents with generalized weakness. HPI is limited due to advanced dementia. Daughter at bedside provides collateral history, reporting that the patient has metastatic breast cancer and has received radiation in the past. She is currently being followed by Dr. Riddle as an outpatient for monitoring. As per the daughter, the patient was ambulatory two weeks ago but has since become more bedbound and lethargic. The patient was found to have an elevated WBC count at a primary care provider visit three days ago and was empirically started on ciprofloxacin without symptom improvement. Today, the patient was noted to have hypoxia to 80% at home by her daughter and was brought to the emergency department for further evaluation. Palliative Consult to further establish GOC  4/21/25 Seen and examined at bedside. Non verbal although awake and eating breakfast as fed by daughter.    Assessment and Plan:    1) AMS  -Dementia  -? baseline  -CT head no acute process  -MRI head noted   -O2 supplementation       2) Breast cancer  -Right sided abdominal pain, constipation  -Liver lesions on CT, GB polyp   -Pulm mets/Bone mets  -CT chest noted  -Heme/onc consult      3) Dementia  - Dysphagia  - RUE tremor   -Fall and aspiration precautions.  -speech and swallow eval. -pureed diet     4) pain   - increased pain unable to describe   - morphine 1mg every 4 hours PRN     5) Advanced Directives  -Pt lacks capacity  -GOC done with daughter Summer  [facility] DNR/DNI/[facility] on chart  -Daughter deciding between [facility] and home hospice  -Our team will follow        Time Spent: 55 minutes including the care, coordination and counseling of this patient, 50% of which was spent coordinating and counseling.

## 2025-04-29 LAB — PTH RELATED PROT SERPL-MCNC: <2 PMOL/L — SIGNIFICANT CHANGE UP

## 2025-05-01 DIAGNOSIS — Z79.82 LONG TERM (CURRENT) USE OF ASPIRIN: ICD-10-CM

## 2025-05-01 DIAGNOSIS — R25.1 TREMOR, UNSPECIFIED: ICD-10-CM

## 2025-05-01 DIAGNOSIS — C79.31 SECONDARY MALIGNANT NEOPLASM OF BRAIN: ICD-10-CM

## 2025-05-01 DIAGNOSIS — R62.7 ADULT FAILURE TO THRIVE: ICD-10-CM

## 2025-05-01 DIAGNOSIS — C79.51 SECONDARY MALIGNANT NEOPLASM OF BONE: ICD-10-CM

## 2025-05-01 DIAGNOSIS — E83.52 HYPERCALCEMIA: ICD-10-CM

## 2025-05-01 DIAGNOSIS — C50.911 MALIGNANT NEOPLASM OF UNSPECIFIED SITE OF RIGHT FEMALE BREAST: ICD-10-CM

## 2025-05-01 DIAGNOSIS — Z79.84 LONG TERM (CURRENT) USE OF ORAL HYPOGLYCEMIC DRUGS: ICD-10-CM

## 2025-05-01 DIAGNOSIS — N13.6 PYONEPHROSIS: ICD-10-CM

## 2025-05-01 DIAGNOSIS — Z17.421 HORMONE RECEPTOR NEGATIVE WITH HUMAN EPIDERMAL GROWTH FACTOR RECEPTOR 2 NEGATIVE STATUS: ICD-10-CM

## 2025-05-01 DIAGNOSIS — Z11.52 ENCOUNTER FOR SCREENING FOR COVID-19: ICD-10-CM

## 2025-05-01 DIAGNOSIS — Z74.01 BED CONFINEMENT STATUS: ICD-10-CM

## 2025-05-01 DIAGNOSIS — R13.10 DYSPHAGIA, UNSPECIFIED: ICD-10-CM

## 2025-05-01 DIAGNOSIS — I10 ESSENTIAL (PRIMARY) HYPERTENSION: ICD-10-CM

## 2025-05-01 DIAGNOSIS — Z85.038 PERSONAL HISTORY OF OTHER MALIGNANT NEOPLASM OF LARGE INTESTINE: ICD-10-CM

## 2025-05-01 DIAGNOSIS — E11.9 TYPE 2 DIABETES MELLITUS WITHOUT COMPLICATIONS: ICD-10-CM

## 2025-05-01 DIAGNOSIS — M84.58XA PATHOLOGICAL FRACTURE IN NEOPLASTIC DISEASE, OTHER SPECIFIED SITE, INITIAL ENCOUNTER FOR FRACTURE: ICD-10-CM

## 2025-05-01 DIAGNOSIS — C78.01 SECONDARY MALIGNANT NEOPLASM OF RIGHT LUNG: ICD-10-CM

## 2025-05-01 DIAGNOSIS — C77.2 SECONDARY AND UNSPECIFIED MALIGNANT NEOPLASM OF INTRA-ABDOMINAL LYMPH NODES: ICD-10-CM

## 2025-05-01 DIAGNOSIS — K59.00 CONSTIPATION, UNSPECIFIED: ICD-10-CM

## 2025-05-01 DIAGNOSIS — F02.80 DEMENTIA IN OTHER DISEASES CLASSIFIED ELSEWHERE, UNSPECIFIED SEVERITY, WITHOUT BEHAVIORAL DISTURBANCE, PSYCHOTIC DISTURBANCE, MOOD DISTURBANCE, AND ANXIETY: ICD-10-CM

## 2025-05-01 DIAGNOSIS — E43 UNSPECIFIED SEVERE PROTEIN-CALORIE MALNUTRITION: ICD-10-CM

## 2025-05-01 DIAGNOSIS — Z92.3 PERSONAL HISTORY OF IRRADIATION: ICD-10-CM

## 2025-05-01 DIAGNOSIS — I62.9 NONTRAUMATIC INTRACRANIAL HEMORRHAGE, UNSPECIFIED: ICD-10-CM

## 2025-05-01 DIAGNOSIS — G93.41 METABOLIC ENCEPHALOPATHY: ICD-10-CM

## 2025-05-01 DIAGNOSIS — J96.01 ACUTE RESPIRATORY FAILURE WITH HYPOXIA: ICD-10-CM

## 2025-05-01 DIAGNOSIS — Z79.890 HORMONE REPLACEMENT THERAPY: ICD-10-CM

## 2025-05-01 DIAGNOSIS — G31.09 OTHER FRONTOTEMPORAL NEUROCOGNITIVE DISORDER: ICD-10-CM

## 2025-05-01 DIAGNOSIS — Z66 DO NOT RESUSCITATE: ICD-10-CM

## 2025-05-01 DIAGNOSIS — C78.02 SECONDARY MALIGNANT NEOPLASM OF LEFT LUNG: ICD-10-CM

## 2025-05-01 DIAGNOSIS — Z51.5 ENCOUNTER FOR PALLIATIVE CARE: ICD-10-CM

## 2025-05-01 DIAGNOSIS — E03.9 HYPOTHYROIDISM, UNSPECIFIED: ICD-10-CM

## 2025-05-13 DIAGNOSIS — C50.919 MALIGNANT NEOPLASM OF UNSPECIFIED SITE OF UNSPECIFIED FEMALE BREAST: ICD-10-CM

## 2025-07-07 ENCOUNTER — APPOINTMENT (OUTPATIENT)
Dept: UROLOGY | Facility: CLINIC | Age: 85
End: 2025-07-07

## 2025-07-17 ENCOUNTER — TRANSCRIPTION ENCOUNTER (OUTPATIENT)
Age: 85
End: 2025-07-17